# Patient Record
Sex: FEMALE | Race: WHITE | Employment: UNEMPLOYED | URBAN - METROPOLITAN AREA
[De-identification: names, ages, dates, MRNs, and addresses within clinical notes are randomized per-mention and may not be internally consistent; named-entity substitution may affect disease eponyms.]

---

## 2017-01-18 RX ORDER — LISINOPRIL 20 MG/1
TABLET ORAL
Qty: 30 TABLET | Refills: 3 | Status: SHIPPED | OUTPATIENT
Start: 2017-01-18 | End: 2017-02-10 | Stop reason: SDUPTHER

## 2017-02-12 RX ORDER — LISINOPRIL 20 MG/1
TABLET ORAL
Qty: 90 TABLET | Refills: 3 | Status: ON HOLD | OUTPATIENT
Start: 2017-02-12 | End: 2017-09-16 | Stop reason: SDDI

## 2017-03-20 ENCOUNTER — HOSPITAL ENCOUNTER (OUTPATIENT)
Age: 62
Discharge: HOME | End: 2017-03-20
Payer: MEDICARE

## 2017-03-20 ENCOUNTER — OFFICE VISIT (OUTPATIENT)
Dept: FAMILY MEDICINE CLINIC | Age: 62
End: 2017-03-20
Payer: MEDICARE

## 2017-03-20 VITALS
HEART RATE: 80 BPM | DIASTOLIC BLOOD PRESSURE: 78 MMHG | WEIGHT: 164 LBS | BODY MASS INDEX: 30.99 KG/M2 | SYSTOLIC BLOOD PRESSURE: 118 MMHG | TEMPERATURE: 96.7 F

## 2017-03-20 DIAGNOSIS — R51.9 FACIAL PAIN: Primary | ICD-10-CM

## 2017-03-20 DIAGNOSIS — F17.200 SMOKER: ICD-10-CM

## 2017-03-20 DIAGNOSIS — I25.10 CORONARY ARTERY DISEASE INVOLVING NATIVE CORONARY ARTERY OF NATIVE HEART WITHOUT ANGINA PECTORIS: ICD-10-CM

## 2017-03-20 DIAGNOSIS — I10 ESSENTIAL HYPERTENSION: ICD-10-CM

## 2017-03-20 DIAGNOSIS — Z12.31 SCREENING MAMMOGRAM, ENCOUNTER FOR: ICD-10-CM

## 2017-03-20 PROCEDURE — G8427 DOCREV CUR MEDS BY ELIG CLIN: HCPCS | Performed by: STUDENT IN AN ORGANIZED HEALTH CARE EDUCATION/TRAINING PROGRAM

## 2017-03-20 PROCEDURE — 99213 OFFICE O/P EST LOW 20 MIN: CPT | Performed by: FAMILY MEDICINE

## 2017-03-20 PROCEDURE — 3017F COLORECTAL CA SCREEN DOC REV: CPT | Performed by: STUDENT IN AN ORGANIZED HEALTH CARE EDUCATION/TRAINING PROGRAM

## 2017-03-20 PROCEDURE — 99213 OFFICE O/P EST LOW 20 MIN: CPT | Performed by: STUDENT IN AN ORGANIZED HEALTH CARE EDUCATION/TRAINING PROGRAM

## 2017-03-20 PROCEDURE — G8484 FLU IMMUNIZE NO ADMIN: HCPCS | Performed by: STUDENT IN AN ORGANIZED HEALTH CARE EDUCATION/TRAINING PROGRAM

## 2017-03-20 PROCEDURE — 4004F PT TOBACCO SCREEN RCVD TLK: CPT | Performed by: STUDENT IN AN ORGANIZED HEALTH CARE EDUCATION/TRAINING PROGRAM

## 2017-03-20 PROCEDURE — G8598 ASA/ANTIPLAT THER USED: HCPCS | Performed by: STUDENT IN AN ORGANIZED HEALTH CARE EDUCATION/TRAINING PROGRAM

## 2017-03-20 PROCEDURE — 3014F SCREEN MAMMO DOC REV: CPT | Performed by: STUDENT IN AN ORGANIZED HEALTH CARE EDUCATION/TRAINING PROGRAM

## 2017-03-20 PROCEDURE — G8417 CALC BMI ABV UP PARAM F/U: HCPCS | Performed by: STUDENT IN AN ORGANIZED HEALTH CARE EDUCATION/TRAINING PROGRAM

## 2017-03-20 RX ORDER — CLOPIDOGREL BISULFATE 75 MG/1
75 TABLET ORAL DAILY
Qty: 30 TABLET | Refills: 3 | Status: ON HOLD | OUTPATIENT
Start: 2017-03-20 | End: 2017-09-16 | Stop reason: SDDI

## 2017-03-20 ASSESSMENT — ENCOUNTER SYMPTOMS
SINUS PRESSURE: 0
EYE DISCHARGE: 0
EYE PAIN: 0
SHORTNESS OF BREATH: 0
VOMITING: 0
PHOTOPHOBIA: 0
SORE THROAT: 0
NAUSEA: 0
EYE REDNESS: 0
ABDOMINAL PAIN: 0

## 2017-04-04 ENCOUNTER — HOSPITAL ENCOUNTER (OUTPATIENT)
Age: 62
Setting detail: OBSERVATION
Discharge: HOME | End: 2017-04-06
Attending: EMERGENCY MEDICINE | Admitting: EMERGENCY MEDICINE
Payer: MEDICARE

## 2017-04-04 ENCOUNTER — APPOINTMENT (OUTPATIENT)
Dept: GENERAL RADIOLOGY | Age: 62
End: 2017-04-04
Payer: MEDICARE

## 2017-04-04 ENCOUNTER — APPOINTMENT (OUTPATIENT)
Dept: MRI IMAGING | Age: 62
End: 2017-04-04
Payer: MEDICARE

## 2017-04-04 DIAGNOSIS — G89.29 CHRONIC MIDLINE LOW BACK PAIN WITH LEFT-SIDED SCIATICA: ICD-10-CM

## 2017-04-04 DIAGNOSIS — R20.2 PARESTHESIAS: Primary | ICD-10-CM

## 2017-04-04 DIAGNOSIS — M79.605 PAIN OF LEFT LOWER EXTREMITY: ICD-10-CM

## 2017-04-04 DIAGNOSIS — M54.42 CHRONIC MIDLINE LOW BACK PAIN WITH LEFT-SIDED SCIATICA: ICD-10-CM

## 2017-04-04 LAB
ABSOLUTE EOS #: 0.3 K/UL (ref 0–0.4)
ABSOLUTE LYMPH #: 2.7 K/UL (ref 1–4.8)
ABSOLUTE MONO #: 0.6 K/UL (ref 0.1–1.2)
ANION GAP SERPL CALCULATED.3IONS-SCNC: 16 MMOL/L (ref 9–17)
BASOPHILS # BLD: 1 % (ref 0–2)
BASOPHILS ABSOLUTE: 0 K/UL (ref 0–0.2)
BUN BLDV-MCNC: 21 MG/DL (ref 8–23)
BUN/CREAT BLD: ABNORMAL (ref 9–20)
C-REACTIVE PROTEIN: 6.3 MG/L (ref 0–5)
CALCIUM SERPL-MCNC: 9.2 MG/DL (ref 8.6–10.4)
CHLORIDE BLD-SCNC: 98 MMOL/L (ref 98–107)
CO2: 22 MMOL/L (ref 20–31)
CREAT SERPL-MCNC: 0.69 MG/DL (ref 0.5–0.9)
DIFFERENTIAL TYPE: NORMAL
EOSINOPHILS RELATIVE PERCENT: 4 % (ref 1–4)
GFR AFRICAN AMERICAN: >60 ML/MIN
GFR NON-AFRICAN AMERICAN: >60 ML/MIN
GFR SERPL CREATININE-BSD FRML MDRD: ABNORMAL ML/MIN/{1.73_M2}
GFR SERPL CREATININE-BSD FRML MDRD: ABNORMAL ML/MIN/{1.73_M2}
GLUCOSE BLD-MCNC: 114 MG/DL (ref 70–99)
HCT VFR BLD CALC: 40 % (ref 36–46)
HEMOGLOBIN: 13.9 G/DL (ref 12–16)
LYMPHOCYTES # BLD: 36 % (ref 24–44)
MCH RBC QN AUTO: 30.7 PG (ref 26–34)
MCHC RBC AUTO-ENTMCNC: 34.8 G/DL (ref 31–37)
MCV RBC AUTO: 88.1 FL (ref 80–100)
MONOCYTES # BLD: 9 % (ref 2–11)
PDW BLD-RTO: 13.7 % (ref 12.5–15.4)
PLATELET # BLD: 230 K/UL (ref 140–450)
PLATELET ESTIMATE: NORMAL
PMV BLD AUTO: 7.8 FL (ref 6–12)
POTASSIUM SERPL-SCNC: 3.5 MMOL/L (ref 3.7–5.3)
RBC # BLD: 4.54 M/UL (ref 4–5.2)
RBC # BLD: NORMAL 10*6/UL
SEDIMENTATION RATE, ERYTHROCYTE: 9 MM (ref 0–20)
SEG NEUTROPHILS: 50 % (ref 36–66)
SEGMENTED NEUTROPHILS ABSOLUTE COUNT: 3.9 K/UL (ref 1.8–7.7)
SODIUM BLD-SCNC: 136 MMOL/L (ref 135–144)
WBC # BLD: 7.5 K/UL (ref 3.5–11)
WBC # BLD: NORMAL 10*3/UL

## 2017-04-04 PROCEDURE — 73590 X-RAY EXAM OF LOWER LEG: CPT

## 2017-04-04 PROCEDURE — 73502 X-RAY EXAM HIP UNI 2-3 VIEWS: CPT

## 2017-04-04 PROCEDURE — 80048 BASIC METABOLIC PNL TOTAL CA: CPT

## 2017-04-04 PROCEDURE — 96374 THER/PROPH/DIAG INJ IV PUSH: CPT

## 2017-04-04 PROCEDURE — G0378 HOSPITAL OBSERVATION PER HR: HCPCS

## 2017-04-04 PROCEDURE — 72146 MRI CHEST SPINE W/O DYE: CPT

## 2017-04-04 PROCEDURE — 85025 COMPLETE CBC W/AUTO DIFF WBC: CPT

## 2017-04-04 PROCEDURE — G0384 LEV 5 HOSP TYPE B ED VISIT: HCPCS

## 2017-04-04 PROCEDURE — 86140 C-REACTIVE PROTEIN: CPT

## 2017-04-04 PROCEDURE — 72148 MRI LUMBAR SPINE W/O DYE: CPT

## 2017-04-04 PROCEDURE — 6370000000 HC RX 637 (ALT 250 FOR IP): Performed by: EMERGENCY MEDICINE

## 2017-04-04 PROCEDURE — 6360000002 HC RX W HCPCS: Performed by: EMERGENCY MEDICINE

## 2017-04-04 PROCEDURE — 85651 RBC SED RATE NONAUTOMATED: CPT

## 2017-04-04 RX ORDER — AMLODIPINE BESYLATE 5 MG/1
5 TABLET ORAL DAILY
Status: DISCONTINUED | OUTPATIENT
Start: 2017-04-05 | End: 2017-04-06 | Stop reason: HOSPADM

## 2017-04-04 RX ORDER — HYDROCODONE BITARTRATE AND ACETAMINOPHEN 5; 325 MG/1; MG/1
1 TABLET ORAL EVERY 4 HOURS PRN
Status: DISCONTINUED | OUTPATIENT
Start: 2017-04-04 | End: 2017-04-06 | Stop reason: HOSPADM

## 2017-04-04 RX ORDER — ACETAMINOPHEN 325 MG/1
650 TABLET ORAL EVERY 4 HOURS PRN
Status: DISCONTINUED | OUTPATIENT
Start: 2017-04-04 | End: 2017-04-06 | Stop reason: HOSPADM

## 2017-04-04 RX ORDER — CLOPIDOGREL BISULFATE 75 MG/1
75 TABLET ORAL DAILY
Status: DISCONTINUED | OUTPATIENT
Start: 2017-04-05 | End: 2017-04-06 | Stop reason: HOSPADM

## 2017-04-04 RX ORDER — MORPHINE SULFATE 4 MG/ML
4 INJECTION, SOLUTION INTRAMUSCULAR; INTRAVENOUS ONCE
Status: COMPLETED | OUTPATIENT
Start: 2017-04-04 | End: 2017-04-04

## 2017-04-04 RX ORDER — LISINOPRIL 20 MG/1
20 TABLET ORAL DAILY
Status: DISCONTINUED | OUTPATIENT
Start: 2017-04-05 | End: 2017-04-06 | Stop reason: HOSPADM

## 2017-04-04 RX ORDER — NITROGLYCERIN 0.4 MG/1
0.4 TABLET SUBLINGUAL EVERY 5 MIN PRN
Status: DISCONTINUED | OUTPATIENT
Start: 2017-04-04 | End: 2017-04-06 | Stop reason: HOSPADM

## 2017-04-04 RX ORDER — SODIUM CHLORIDE 0.9 % (FLUSH) 0.9 %
10 SYRINGE (ML) INJECTION PRN
Status: DISCONTINUED | OUTPATIENT
Start: 2017-04-04 | End: 2017-04-06 | Stop reason: HOSPADM

## 2017-04-04 RX ORDER — HYDROCODONE BITARTRATE AND ACETAMINOPHEN 5; 325 MG/1; MG/1
2 TABLET ORAL EVERY 4 HOURS PRN
Status: DISCONTINUED | OUTPATIENT
Start: 2017-04-04 | End: 2017-04-05

## 2017-04-04 RX ORDER — OMEPRAZOLE 20 MG/1
20 CAPSULE, DELAYED RELEASE ORAL DAILY
Status: DISCONTINUED | OUTPATIENT
Start: 2017-04-05 | End: 2017-04-06 | Stop reason: HOSPADM

## 2017-04-04 RX ORDER — ONDANSETRON 2 MG/ML
4 INJECTION INTRAMUSCULAR; INTRAVENOUS EVERY 6 HOURS PRN
Status: DISCONTINUED | OUTPATIENT
Start: 2017-04-04 | End: 2017-04-06 | Stop reason: HOSPADM

## 2017-04-04 RX ORDER — METOPROLOL SUCCINATE 25 MG/1
12.5 TABLET, EXTENDED RELEASE ORAL EVERY EVENING
Status: DISCONTINUED | OUTPATIENT
Start: 2017-04-04 | End: 2017-04-06 | Stop reason: HOSPADM

## 2017-04-04 RX ORDER — ATORVASTATIN CALCIUM 40 MG/1
40 TABLET, FILM COATED ORAL NIGHTLY
Status: DISCONTINUED | OUTPATIENT
Start: 2017-04-04 | End: 2017-04-06 | Stop reason: HOSPADM

## 2017-04-04 RX ORDER — SODIUM CHLORIDE 0.9 % (FLUSH) 0.9 %
10 SYRINGE (ML) INJECTION EVERY 12 HOURS SCHEDULED
Status: DISCONTINUED | OUTPATIENT
Start: 2017-04-04 | End: 2017-04-06 | Stop reason: HOSPADM

## 2017-04-04 RX ORDER — OXYCODONE HYDROCHLORIDE AND ACETAMINOPHEN 5; 325 MG/1; MG/1
1 TABLET ORAL ONCE
Status: COMPLETED | OUTPATIENT
Start: 2017-04-04 | End: 2017-04-04

## 2017-04-04 RX ADMIN — MORPHINE SULFATE 4 MG: 4 INJECTION, SOLUTION INTRAMUSCULAR; INTRAVENOUS at 19:50

## 2017-04-04 RX ADMIN — OXYCODONE HYDROCHLORIDE AND ACETAMINOPHEN 1 TABLET: 5; 325 TABLET ORAL at 18:56

## 2017-04-04 ASSESSMENT — ENCOUNTER SYMPTOMS
SHORTNESS OF BREATH: 0
STRIDOR: 0
DIARRHEA: 0
COUGH: 0
NAUSEA: 0
ABDOMINAL PAIN: 0
WHEEZING: 0
VOMITING: 0
BACK PAIN: 1
CONSTIPATION: 0

## 2017-04-04 ASSESSMENT — PAIN DESCRIPTION - PAIN TYPE
TYPE: ACUTE PAIN
TYPE: ACUTE PAIN

## 2017-04-04 ASSESSMENT — PAIN DESCRIPTION - DESCRIPTORS
DESCRIPTORS: SHOOTING
DESCRIPTORS: SHOOTING

## 2017-04-04 ASSESSMENT — PAIN DESCRIPTION - ONSET
ONSET: SUDDEN
ONSET: SUDDEN

## 2017-04-04 ASSESSMENT — PAIN DESCRIPTION - PROGRESSION
CLINICAL_PROGRESSION: GRADUALLY IMPROVING
CLINICAL_PROGRESSION: GRADUALLY WORSENING

## 2017-04-04 ASSESSMENT — PAIN DESCRIPTION - ORIENTATION
ORIENTATION: LEFT
ORIENTATION: LEFT

## 2017-04-04 ASSESSMENT — PAIN DESCRIPTION - LOCATION
LOCATION: FOOT;LEG
LOCATION: FOOT;LEG

## 2017-04-04 ASSESSMENT — PAIN DESCRIPTION - FREQUENCY: FREQUENCY: INTERMITTENT

## 2017-04-04 ASSESSMENT — PAIN SCALES - GENERAL
PAINLEVEL_OUTOF10: 10
PAINLEVEL_OUTOF10: 7
PAINLEVEL_OUTOF10: 10
PAINLEVEL_OUTOF10: 1

## 2017-04-05 PROCEDURE — 6370000000 HC RX 637 (ALT 250 FOR IP): Performed by: STUDENT IN AN ORGANIZED HEALTH CARE EDUCATION/TRAINING PROGRAM

## 2017-04-05 PROCEDURE — 96375 TX/PRO/DX INJ NEW DRUG ADDON: CPT

## 2017-04-05 PROCEDURE — 2580000003 HC RX 258: Performed by: STUDENT IN AN ORGANIZED HEALTH CARE EDUCATION/TRAINING PROGRAM

## 2017-04-05 PROCEDURE — 6360000002 HC RX W HCPCS: Performed by: STUDENT IN AN ORGANIZED HEALTH CARE EDUCATION/TRAINING PROGRAM

## 2017-04-05 PROCEDURE — G8979 MOBILITY GOAL STATUS: HCPCS

## 2017-04-05 PROCEDURE — 97162 PT EVAL MOD COMPLEX 30 MIN: CPT

## 2017-04-05 PROCEDURE — G8978 MOBILITY CURRENT STATUS: HCPCS

## 2017-04-05 PROCEDURE — 97530 THERAPEUTIC ACTIVITIES: CPT

## 2017-04-05 PROCEDURE — G0378 HOSPITAL OBSERVATION PER HR: HCPCS

## 2017-04-05 RX ORDER — HYDROCODONE BITARTRATE AND ACETAMINOPHEN 5; 325 MG/1; MG/1
1 TABLET ORAL EVERY 4 HOURS PRN
Qty: 12 TABLET | Refills: 0 | Status: SHIPPED | OUTPATIENT
Start: 2017-04-05 | End: 2017-04-12

## 2017-04-05 RX ORDER — HYDROCODONE BITARTRATE AND ACETAMINOPHEN 5; 325 MG/1; MG/1
1 TABLET ORAL EVERY 4 HOURS PRN
Qty: 12 TABLET | Refills: 0 | Status: SHIPPED | OUTPATIENT
Start: 2017-04-05 | End: 2017-04-05

## 2017-04-05 RX ORDER — ONDANSETRON 4 MG/1
4 TABLET, FILM COATED ORAL EVERY 8 HOURS PRN
Qty: 20 TABLET | Refills: 0 | Status: ON HOLD | OUTPATIENT
Start: 2017-04-05 | End: 2017-09-16 | Stop reason: ALTCHOICE

## 2017-04-05 RX ADMIN — OMEPRAZOLE 20 MG: 20 CAPSULE, DELAYED RELEASE ORAL at 08:18

## 2017-04-05 RX ADMIN — HYDROCODONE BITARTRATE AND ACETAMINOPHEN 2 TABLET: 5; 325 TABLET ORAL at 08:18

## 2017-04-05 RX ADMIN — METOPROLOL SUCCINATE 12.5 MG: 25 TABLET, FILM COATED, EXTENDED RELEASE ORAL at 01:22

## 2017-04-05 RX ADMIN — ATORVASTATIN CALCIUM 40 MG: 40 TABLET, FILM COATED ORAL at 21:14

## 2017-04-05 RX ADMIN — LISINOPRIL 20 MG: 20 TABLET ORAL at 08:18

## 2017-04-05 RX ADMIN — Medication 10 ML: at 01:23

## 2017-04-05 RX ADMIN — Medication 10 ML: at 08:18

## 2017-04-05 RX ADMIN — AMLODIPINE BESYLATE 5 MG: 5 TABLET ORAL at 08:18

## 2017-04-05 RX ADMIN — Medication 10 ML: at 21:14

## 2017-04-05 RX ADMIN — Medication 10 ML: at 12:47

## 2017-04-05 RX ADMIN — METOPROLOL SUCCINATE 12.5 MG: 25 TABLET, FILM COATED, EXTENDED RELEASE ORAL at 08:17

## 2017-04-05 RX ADMIN — ONDANSETRON 4 MG: 2 INJECTION, SOLUTION INTRAMUSCULAR; INTRAVENOUS at 12:47

## 2017-04-05 RX ADMIN — CLOPIDOGREL 75 MG: 75 TABLET, FILM COATED ORAL at 08:18

## 2017-04-05 RX ADMIN — ATORVASTATIN CALCIUM 40 MG: 40 TABLET, FILM COATED ORAL at 01:22

## 2017-04-05 ASSESSMENT — PAIN SCALES - GENERAL
PAINLEVEL_OUTOF10: 7
PAINLEVEL_OUTOF10: 3
PAINLEVEL_OUTOF10: 7

## 2017-04-05 ASSESSMENT — PAIN DESCRIPTION - PAIN TYPE
TYPE: ACUTE PAIN
TYPE: ACUTE PAIN

## 2017-04-05 ASSESSMENT — PAIN DESCRIPTION - FREQUENCY: FREQUENCY: INTERMITTENT

## 2017-04-05 ASSESSMENT — PAIN DESCRIPTION - LOCATION
LOCATION: FOOT;LEG
LOCATION: FOOT;LEG

## 2017-04-05 ASSESSMENT — PAIN DESCRIPTION - PROGRESSION: CLINICAL_PROGRESSION: NOT CHANGED

## 2017-04-05 ASSESSMENT — PAIN DESCRIPTION - DESCRIPTORS: DESCRIPTORS: NUMBNESS;SHOOTING

## 2017-04-05 ASSESSMENT — PAIN DESCRIPTION - ONSET: ONSET: SUDDEN

## 2017-04-05 ASSESSMENT — PAIN DESCRIPTION - ORIENTATION
ORIENTATION: LEFT
ORIENTATION: LEFT

## 2017-04-06 ENCOUNTER — TELEPHONE (OUTPATIENT)
Dept: FAMILY MEDICINE CLINIC | Age: 62
End: 2017-04-06

## 2017-04-06 ENCOUNTER — APPOINTMENT (OUTPATIENT)
Dept: MRI IMAGING | Age: 62
End: 2017-04-06
Payer: MEDICARE

## 2017-04-06 VITALS
HEIGHT: 61 IN | RESPIRATION RATE: 16 BRPM | DIASTOLIC BLOOD PRESSURE: 79 MMHG | WEIGHT: 164.8 LBS | OXYGEN SATURATION: 97 % | BODY MASS INDEX: 31.11 KG/M2 | SYSTOLIC BLOOD PRESSURE: 151 MMHG | TEMPERATURE: 98.4 F | HEART RATE: 68 BPM

## 2017-04-06 PROCEDURE — G8987 SELF CARE CURRENT STATUS: HCPCS

## 2017-04-06 PROCEDURE — G8989 SELF CARE D/C STATUS: HCPCS

## 2017-04-06 PROCEDURE — 97535 SELF CARE MNGMENT TRAINING: CPT

## 2017-04-06 PROCEDURE — 6370000000 HC RX 637 (ALT 250 FOR IP): Performed by: STUDENT IN AN ORGANIZED HEALTH CARE EDUCATION/TRAINING PROGRAM

## 2017-04-06 PROCEDURE — 97116 GAIT TRAINING THERAPY: CPT

## 2017-04-06 PROCEDURE — 2580000003 HC RX 258: Performed by: EMERGENCY MEDICINE

## 2017-04-06 PROCEDURE — 97110 THERAPEUTIC EXERCISES: CPT

## 2017-04-06 PROCEDURE — A9579 GAD-BASE MR CONTRAST NOS,1ML: HCPCS | Performed by: EMERGENCY MEDICINE

## 2017-04-06 PROCEDURE — G8988 SELF CARE GOAL STATUS: HCPCS

## 2017-04-06 PROCEDURE — G0378 HOSPITAL OBSERVATION PER HR: HCPCS

## 2017-04-06 PROCEDURE — 6360000004 HC RX CONTRAST MEDICATION: Performed by: EMERGENCY MEDICINE

## 2017-04-06 PROCEDURE — 97165 OT EVAL LOW COMPLEX 30 MIN: CPT

## 2017-04-06 PROCEDURE — 72147 MRI CHEST SPINE W/DYE: CPT

## 2017-04-06 RX ORDER — SODIUM CHLORIDE 0.9 % (FLUSH) 0.9 %
10 SYRINGE (ML) INJECTION 2 TIMES DAILY
Status: DISCONTINUED | OUTPATIENT
Start: 2017-04-06 | End: 2017-04-06 | Stop reason: HOSPADM

## 2017-04-06 RX ADMIN — AMLODIPINE BESYLATE 5 MG: 5 TABLET ORAL at 09:02

## 2017-04-06 RX ADMIN — OMEPRAZOLE 20 MG: 20 CAPSULE, DELAYED RELEASE ORAL at 09:03

## 2017-04-06 RX ADMIN — LISINOPRIL 20 MG: 20 TABLET ORAL at 09:02

## 2017-04-06 RX ADMIN — GADOPENTETATE DIMEGLUMINE 14 ML: 469.01 INJECTION INTRAVENOUS at 05:38

## 2017-04-06 RX ADMIN — CLOPIDOGREL 75 MG: 75 TABLET, FILM COATED ORAL at 09:02

## 2017-04-06 RX ADMIN — Medication 10 ML: at 09:03

## 2017-04-06 RX ADMIN — METOPROLOL SUCCINATE 12.5 MG: 25 TABLET, FILM COATED, EXTENDED RELEASE ORAL at 09:02

## 2017-04-11 ENCOUNTER — TELEPHONE (OUTPATIENT)
Dept: FAMILY MEDICINE CLINIC | Age: 62
End: 2017-04-11

## 2017-04-19 ENCOUNTER — OFFICE VISIT (OUTPATIENT)
Dept: FAMILY MEDICINE CLINIC | Age: 62
End: 2017-04-19
Payer: MEDICARE

## 2017-04-19 VITALS
SYSTOLIC BLOOD PRESSURE: 157 MMHG | HEART RATE: 126 BPM | TEMPERATURE: 97.7 F | DIASTOLIC BLOOD PRESSURE: 93 MMHG | WEIGHT: 162.8 LBS | BODY MASS INDEX: 30.73 KG/M2 | HEIGHT: 61 IN

## 2017-04-19 DIAGNOSIS — M48.061 FORAMINAL STENOSIS OF LUMBAR REGION: ICD-10-CM

## 2017-04-19 DIAGNOSIS — D18.09 VERTEBRAL BODY HEMANGIOMA: ICD-10-CM

## 2017-04-19 DIAGNOSIS — R20.0 LEFT LEG NUMBNESS: Primary | ICD-10-CM

## 2017-04-19 PROCEDURE — G8427 DOCREV CUR MEDS BY ELIG CLIN: HCPCS | Performed by: FAMILY MEDICINE

## 2017-04-19 PROCEDURE — 99214 OFFICE O/P EST MOD 30 MIN: CPT

## 2017-04-19 PROCEDURE — 4004F PT TOBACCO SCREEN RCVD TLK: CPT | Performed by: FAMILY MEDICINE

## 2017-04-19 PROCEDURE — 99213 OFFICE O/P EST LOW 20 MIN: CPT | Performed by: FAMILY MEDICINE

## 2017-04-19 PROCEDURE — 3017F COLORECTAL CA SCREEN DOC REV: CPT | Performed by: FAMILY MEDICINE

## 2017-04-19 PROCEDURE — G8598 ASA/ANTIPLAT THER USED: HCPCS | Performed by: FAMILY MEDICINE

## 2017-04-19 PROCEDURE — G8417 CALC BMI ABV UP PARAM F/U: HCPCS | Performed by: FAMILY MEDICINE

## 2017-04-19 PROCEDURE — 3014F SCREEN MAMMO DOC REV: CPT | Performed by: FAMILY MEDICINE

## 2017-04-19 ASSESSMENT — ENCOUNTER SYMPTOMS
BACK PAIN: 0
VOMITING: 0
NAUSEA: 0
ABDOMINAL PAIN: 0
SHORTNESS OF BREATH: 0

## 2017-05-08 RX ORDER — LORATADINE 10 MG/1
TABLET ORAL
Qty: 30 TABLET | Refills: 0 | Status: ON HOLD | OUTPATIENT
Start: 2017-05-08 | End: 2017-09-16 | Stop reason: SDDI

## 2017-09-15 ENCOUNTER — HOSPITAL ENCOUNTER (INPATIENT)
Age: 62
LOS: 3 days | Discharge: HOME | DRG: 280 | End: 2017-09-21
Attending: EMERGENCY MEDICINE | Admitting: INTERNAL MEDICINE
Payer: MEDICARE

## 2017-09-15 ENCOUNTER — APPOINTMENT (OUTPATIENT)
Dept: GENERAL RADIOLOGY | Age: 62
DRG: 280 | End: 2017-09-15
Payer: MEDICARE

## 2017-09-15 DIAGNOSIS — R07.9 CHEST PAIN, UNSPECIFIED TYPE: ICD-10-CM

## 2017-09-15 DIAGNOSIS — I25.10 CORONARY ARTERY DISEASE INVOLVING NATIVE CORONARY ARTERY OF NATIVE HEART WITHOUT ANGINA PECTORIS: Primary | ICD-10-CM

## 2017-09-15 LAB
ABSOLUTE EOS #: 0.2 K/UL (ref 0–0.4)
ABSOLUTE LYMPH #: 3.2 K/UL (ref 1–4.8)
ABSOLUTE MONO #: 0.7 K/UL (ref 0.2–0.8)
ANION GAP SERPL CALCULATED.3IONS-SCNC: 16 MMOL/L (ref 9–17)
BASOPHILS # BLD: 1 %
BASOPHILS ABSOLUTE: 0.1 K/UL (ref 0–0.2)
BUN BLDV-MCNC: 24 MG/DL (ref 8–23)
BUN/CREAT BLD: 34 (ref 9–20)
CALCIUM SERPL-MCNC: 9 MG/DL (ref 8.6–10.4)
CHLORIDE BLD-SCNC: 104 MMOL/L (ref 98–107)
CO2: 21 MMOL/L (ref 20–31)
CREAT SERPL-MCNC: 0.7 MG/DL (ref 0.5–0.9)
DIFFERENTIAL TYPE: NORMAL
EOSINOPHILS RELATIVE PERCENT: 3 %
GFR AFRICAN AMERICAN: >60 ML/MIN
GFR NON-AFRICAN AMERICAN: >60 ML/MIN
GFR SERPL CREATININE-BSD FRML MDRD: ABNORMAL ML/MIN/{1.73_M2}
GFR SERPL CREATININE-BSD FRML MDRD: ABNORMAL ML/MIN/{1.73_M2}
GLUCOSE BLD-MCNC: 148 MG/DL (ref 70–99)
HCT VFR BLD CALC: 42.7 % (ref 36–46)
HEMOGLOBIN: 14.6 G/DL (ref 12–16)
INR BLD: 0.9
LYMPHOCYTES # BLD: 36 %
MCH RBC QN AUTO: 30.7 PG (ref 26–34)
MCHC RBC AUTO-ENTMCNC: 34.1 G/DL (ref 31–37)
MCV RBC AUTO: 90.2 FL (ref 80–100)
MONOCYTES # BLD: 8 %
MYOGLOBIN: <21 NG/ML (ref 25–58)
PARTIAL THROMBOPLASTIN TIME: 25.4 SEC (ref 23–31)
PDW BLD-RTO: 13.8 % (ref 11.5–14.5)
PLATELET # BLD: 214 K/UL (ref 130–400)
PLATELET ESTIMATE: NORMAL
PMV BLD AUTO: 8 FL (ref 6–12)
POTASSIUM SERPL-SCNC: 4.6 MMOL/L (ref 3.7–5.3)
PROTHROMBIN TIME: 9.5 SEC (ref 9.7–11.6)
RBC # BLD: 4.74 M/UL (ref 4–5.2)
RBC # BLD: NORMAL 10*6/UL
SEG NEUTROPHILS: 52 %
SEGMENTED NEUTROPHILS ABSOLUTE COUNT: 4.7 K/UL (ref 1.8–7.7)
SODIUM BLD-SCNC: 141 MMOL/L (ref 135–144)
TROPONIN INTERP: ABNORMAL
TROPONIN T: <0.03 NG/ML
WBC # BLD: 8.9 K/UL (ref 3.5–11)
WBC # BLD: NORMAL 10*3/UL

## 2017-09-15 PROCEDURE — 85730 THROMBOPLASTIN TIME PARTIAL: CPT

## 2017-09-15 PROCEDURE — 2580000003 HC RX 258: Performed by: INTERNAL MEDICINE

## 2017-09-15 PROCEDURE — 99285 EMERGENCY DEPT VISIT HI MDM: CPT

## 2017-09-15 PROCEDURE — G0378 HOSPITAL OBSERVATION PER HR: HCPCS

## 2017-09-15 PROCEDURE — 71010 XR CHEST PORTABLE: CPT

## 2017-09-15 PROCEDURE — 83874 ASSAY OF MYOGLOBIN: CPT

## 2017-09-15 PROCEDURE — 80048 BASIC METABOLIC PNL TOTAL CA: CPT

## 2017-09-15 PROCEDURE — 36415 COLL VENOUS BLD VENIPUNCTURE: CPT

## 2017-09-15 PROCEDURE — 93005 ELECTROCARDIOGRAM TRACING: CPT

## 2017-09-15 PROCEDURE — 84484 ASSAY OF TROPONIN QUANT: CPT

## 2017-09-15 PROCEDURE — 85025 COMPLETE CBC W/AUTO DIFF WBC: CPT

## 2017-09-15 PROCEDURE — 85610 PROTHROMBIN TIME: CPT

## 2017-09-15 RX ORDER — SODIUM CHLORIDE 0.9 % (FLUSH) 0.9 %
10 SYRINGE (ML) INJECTION PRN
Status: DISCONTINUED | OUTPATIENT
Start: 2017-09-15 | End: 2017-09-16 | Stop reason: SDUPTHER

## 2017-09-15 RX ORDER — SODIUM CHLORIDE 0.9 % (FLUSH) 0.9 %
10 SYRINGE (ML) INJECTION EVERY 12 HOURS SCHEDULED
Status: DISCONTINUED | OUTPATIENT
Start: 2017-09-15 | End: 2017-09-16 | Stop reason: SDUPTHER

## 2017-09-15 RX ADMIN — Medication 10 ML: at 23:45

## 2017-09-15 ASSESSMENT — ENCOUNTER SYMPTOMS
DIARRHEA: 0
EYE DISCHARGE: 0
EYE REDNESS: 0
CONSTIPATION: 0
ABDOMINAL PAIN: 0
VOMITING: 0
COUGH: 0
FACIAL SWELLING: 0
COLOR CHANGE: 0
SHORTNESS OF BREATH: 0

## 2017-09-15 NOTE — IP AVS SNAPSHOT
Patient Information     Patient Name TANIYA Gupta 1955         This is your updated medication list to keep with you all times      TAKE these medications     amLODIPine 5 MG tablet   Commonly known as:  NORVASC   Take 1 tablet by mouth daily       aspirin 81 MG tablet       atorvastatin 80 MG tablet   Commonly known as:  LIPITOR   Take 1 tablet by mouth nightly       cephALEXin 500 MG capsule   Commonly known as:  KEFLEX   Take 1 capsule by mouth 4 times daily       ibuprofen 200 MG tablet   Commonly known as:  ADVIL;MOTRIN       metoprolol tartrate 25 MG tablet   Commonly known as:  LOPRESSOR   Take 1 tablet by mouth 2 times daily       neomycin-bacitracin-polymyxin 400-5-5000 ointment   Commonly known as:  NEOSPORIN   Apply topically 2 times daily. NITROSTAT 0.4 MG SL tablet   Generic drug:  nitroGLYCERIN   DISSOLVE 1 TAB UNDER TONGUE FOR CHEST PAIN - IF PAIN REMAINS AFTER 5 MIN, CALL 911 AND REPEAT DOSE.  MAX 3 TABS IN 15 MINUTES

## 2017-09-15 NOTE — IP AVS SNAPSHOT
After Visit Summary  (Discharge Instructions)    Medication List for Home    Based on the information you provided to us as well as any changes during this visit, the following is your updated medication list.  Compare this with your prescription bottles at home. If you have any questions or concerns, contact your primary care physician's office. Daily Medication List (This medication list can be shared with any healthcare provider who is helping you manage your medications)      There are NEW medications for you. START taking them after you leave the hospital        Last Dose    Next Dose Due AM NOON PM NIGHT    amLODIPine 5 MG tablet   Commonly known as:  NORVASC   Take 1 tablet by mouth daily                5 mg on 9/21/2017 11:08 AM     9/22/17                       atorvastatin 80 MG tablet   Commonly known as:  LIPITOR   Take 1 tablet by mouth nightly                80 mg on 9/20/2017  9:08 PM     Take tonight 9/21/17                Take tonight 9/21/17       cephALEXin 500 MG capsule   Commonly known as:  KEFLEX   Take 1 capsule by mouth 4 times daily                  Take tonight 9/21/17                         metoprolol tartrate 25 MG tablet   Commonly known as:  LOPRESSOR   Take 1 tablet by mouth 2 times daily                25 mg on 9/20/2017  9:08 PM     Take tonight 9/21/17                       neomycin-bacitracin-polymyxin 400-5-5000 ointment   Commonly known as:  NEOSPORIN   Apply topically 2 times daily.                 9/21/2017 11:07 AM     9/21/17                         These are medications you told us you were taking at home, CONTINUE taking them after you leave the hospital        Last Dose    Next Dose Due AM NOON PM NIGHT    aspirin 81 MG tablet   Take 81 mg by mouth daily                  9/22/17                       ibuprofen 200 MG tablet   Commonly known as:  ADVIL;MOTRIN   Take 200 mg by mouth every 6 hours as needed for Pain 400 mg on 2017  6:05 PM                            NITROSTAT 0.4 MG SL tablet   Generic drug:  nitroGLYCERIN   DISSOLVE 1 TAB UNDER TONGUE FOR CHEST PAIN - IF PAIN REMAINS AFTER 5 MIN, CALL 911 AND REPEAT DOSE. MAX 3 TABS IN 15 MINUTES                                              Where to Get Your Medications      You can get these medications from any pharmacy     Bring a paper prescription for each of these medications     amLODIPine 5 MG tablet    atorvastatin 80 MG tablet    cephALEXin 500 MG capsule    metoprolol tartrate 25 MG tablet    neomycin-bacitracin-polymyxin 400-5-5000 ointment               Allergies as of 2017        Reactions    Latex Hives, Swelling    Codeine Nausea And Vomiting    Tape [Adhesive Tape] Dermatitis    Medical tape      Immunizations as of 2017     Name Date Dose VIS Date Route    Pneumococcal Polysaccharide (Astpeoojk54) 2012 -- -- --    Tdap (Boostrix, Adacel) 2016 0.5 mL 2015 Intramuscular      Last Vitals          Most Recent Value    Temp  99 °F (37.2 °C)    Pulse  91    Resp  16    BP  126/67         After Visit Summary    This summary was created for you. Thank you for entrusting your care to us. The following information includes details about your hospital/visit stay along with steps you should take to help with your recovery once you leave the hospital.  In this packet, you will find information about the topics listed below:    · Instructions about your medications including a list of your home medications  · A summary of your hospital visit  · Follow-up appointments once you have left the hospital  · Your care plan at home      You may receive a survey regarding the care you received during your stay. Your input is valuable to us. We encourage you to complete and return your survey in the envelope provided. We hope you will choose us in the future for your healthcare needs.           Patient Information     Patient Name  Mary Ann Severino 1955      Care Provided at:     Name Address Phone       [address] [place] [id number] [facility] [phone]            Your Visit    Here you will find information about your visit, including the reason for your visit. Please take this sheet with you when you visit your doctor or other health care provider in the future. It will help determine the best possible medical care for you at that time. If you have any questions once you leave the hospital, please call the department phone number listed below. Why you were here     Your primary diagnosis was:  S/P Cardiac Cath    Your diagnoses also included:  Chest Pain, Cad (Coronary Artery Disease), Copd (Chronic Obstructive Pulmonary Disease) (Hcc), Non-Compliance, Cellulitis Of Arm, Right, Superficial Thrombophlebitis Of Right Upper Extremity      Visit Information     Date & Time Provider Department Dept. Phone    9/15/2017 Jm Rodriguez MD [facility] [phone]       Follow-up Appointments    Below is a list of your follow-up and future appointments. This may not be a complete list as you may have made appointments directly with providers that we are not aware of or your providers may have made some for you. Please call your providers to confirm appointments. It is important to keep your appointments. Please bring your current insurance card, photo ID, co-pay, and all medication bottles to your appointment. If self-pay, payment is expected at the time of service. Follow-up Information     Follow up with Jm Rodriguez MD.    Specialty:  Internal Medicine    Why:  For follow-up s/p hospitalization. Contact information:    [address] [place] [address]  [phone]          Follow up with Patsy Carter MD .    Specialty:  Family Medicine    Contact information:    [place] [address]  [phone]          Follow up with Viridiana Hernandez MD On 9/27/2017. Specialties:  Cardiology, Internal Medicine    Why:  at 10:20 AM for follow up appointment after hospitalization    Contact information:    [place]  [address]  [phone]          Follow up with Kym Guerrero On 9/26/2017. Why:  at 11:00 AM for follow up appointment r/t CABG    Contact information:    [address]  [address]        Follow up with Princess Lemon MD In 1 week. Specialty:  Family Medicine    Contact information:    [facility] [address]  [phone]        Future Appointments     9/27/2017 10:20 AM     Appointment with Todd Hinkle MD at [address] [place] [address] ([phone])   Please arrive 15 minutes prior to appointment time, bring insurance card and photo ID.    [address] [place] [address]  [phone]         Preventive Care        Date Due    Hepatitis C screening is recommended for all adults regardless of risk factors born between [facility] at least once (lifetime) who have never been tested. 1955    HIV screening is recommended for all people regardless of risk factors  aged 15-65 years at least once (lifetime) who have never been HIV tested. 7/6/1969    Zoster Vaccine 7/6/2014    Mammograms are recommended every 2 years for low/average risk patients aged 48 - 69, and every year for high risk patients per updated national guidelines. However these guidelines can be individualized by your provider.  3/25/2016    Yearly Flu Vaccine (1) 9/1/2017    Colon Cancer Stool Test 12/19/2017 (Originally 4/5/2015)    Diabetes Screening 8/25/2019    Cholesterol Screening 9/16/2022    Tetanus Combination Vaccine (2 - Td) 12/7/2026                 Care Plan Once You Return Home    This section includes instructions you will need to follow once you leave the hospital.  Your care team will discuss these with you, so you and those caring for you know how to best care for your health needs at home. This section may also include educational information about certain health topics that may be of help to you.           Discharge [address] [place] [address] Form    Patient Name: Grace Pedersen   :  1955  MRN:  [id number]    Admit date:  9/15/2017  Discharge date:  ***    Code Status Order: Full Code   Advance Directives: {YES OR NO:}    Admitting Physician:  Dylan Peck MD  PCP: Regino Wagner MD    Discharging Nurse: [facility] Unit/Room#: 2034/2034-01  Discharging Unit Phone Number: ***    Emergency Contact:        Past Surgical History:  Past Surgical History:   Procedure Laterality Date    CORONARY ANGIOPLASTY WITH STENT PLACEMENT  2006,2013    3 stents total    CYST REMOVAL Right 2013    right foot    ENDOSCOPY, COLON, DIAGNOSTIC      stomach ulcer    HYSTERECTOMY      total    SKIN BIOPSY      neg , on back   [address]       Immunization History:   Immunization History   Administered Date(s) Administered    Pneumococcal Polysaccharide (Pjloqkyqv13) 2012    Tdap (Boostrix, Adacel) 2016       Active Problems:  Patient Active Problem List   Diagnosis    CAD (coronary artery disease)    Chronic bronchitis ([address]    Smoker    GERD (gastroesophageal reflux disease)    Thoracic scoliosis    Ganglion cyst    CAD S/P percutaneous coronary angioplasty (2013-Dr. Ruddy Cummings)    CAD S/P percutaneous coronary angioplasty-open stent( 2013-Dr. Serafin Espinal)    Chest pain    CAD (coronary artery disease)    HTN (hypertension)    Atypical chest pain    S/P - Mid RCA 10/17/14-Dr. nichols    S/P stents LAD, LCX and proximal RCA -     Hyperlipidemia    Chest pain     HLD (hyperlipidemia)    GERD (gastroesophageal reflux disease)    Chronic back pain    Precordial pain    Breast cancer screening    Lumbosacral pain Routes of Feeding: {CHP DME Other Feedings:067090555}  Liquids: {Slp liquid thickness:96376}  Daily Fluid Restriction: {CHP DME Yes amt example:909665860}  Last Modified Barium Swallow with Video (Video Swallowing Test): {Done Not Done SNOI:386652449}    Treatments at the Time of Hospital Discharge:   Respiratory Treatments: ***  Oxygen Therapy:  {Therapy; copd oxygen:43073}  Ventilator:    {[facility] Vent NALT:545070513}    Rehab Therapies: {THERAPEUTIC INTERVENTION:6158412928}  Weight Bearing Status/Restrictions: {[facility] Weight Bearin}  Other Medical Equipment (for information only, NOT a DME order):  {EQUIPMENT:404401507}  Other Treatments: ***    Patient's personal belongings (please select all that are sent with patient):  {[facility] DME Belongings:210232076}    RN SIGNATURE:  {Esignature:179958234}    PHYSICIAN SECTION    Prognosis: {Prognosis:6862082434}    Condition at Discharge: [address] [place] [address] Patient Condition:951037631}    Rehab Potential (if transferring to Rehab): {Prognosis:8143546287}    Recommended Labs or Other Treatments After Discharge: ***    Physician Certification: I certify the above information and transfer of Kolby Gibson  is necessary for the continuing treatment of the diagnosis listed and that she requires {Admit to Appropriate Level of Care:58814} for {GREATER/LESS:011355398} 30 days.      Update Admission H&P: {CHP DME Changes in KRXYC:746043307}    PHYSICIAN SIGNATURE:  {Esignature:083590132}    CASE MANAGEMENT/SOCIAL WORK SECTION    Inpatient Status Date: ***    [facility] Readmission Risk Assessment Score:  Risk Score: 23.75   (Score > 14= high risk for readmission)    Discharging to Facility/ Agency   · Name:   · Address:  · Phone:  · Fax:    Dialysis Facility (if applicable)   · Name:  · Address:  · Dialysis Schedule:  · Phone:  · Fax:    / signature: {Esignature:134995475}      Important information for a smoker SMOKING: QUIT SMOKING. THIS IS THE MOST IMPORTANT ACTION YOU CAN TAKE TO IMPROVE YOUR CURRENT AND FUTURE HEALTH. Call the [address] [place] [address] at [place] NOW ([phone])    Smoking harms nonsmokers. When nonsmokers are around people who smoke, they absorb nicotine, carbon monoxide, and other ingredients of tobacco smoke. DO NOT SMOKE AROUND CHILDREN     Children exposed to secondhand smoke are at an increased risk of:  Sudden Infant Death Syndrome (SIDS), acute respiratory infections, inflammation of the middle ear, and severe asthma. Over a longer time, it causes heart disease and lung cancer. There is no safe level of exposure to secondhand smoke. [employer] Signup     Our records indicate that you have an active [employer] account. You can view your After Visit Summary by going to [email or web address] and logging in with your [employer] username and password. If you don't have a [employer] username and password but a parent or guardian has access to your record, the parent or guardian should login with their own [employer] username and password and access your record to view the After Visit Summary. Additional Information  If you have questions, please contact the physician practice where you receive care. Remember, [employer] is NOT to be used for urgent needs. For medical emergencies, dial 911. For questions regarding your [employer] account call [phone]. If you have a clinical question, please call your doctor's office. View your information online  ? Review your current list of  medications, immunization, and allergies. ? Review your future test results online . ?  Review your discharge instructions provided by your caregivers at discharge    Certain functionality such as prescription refills, scheduling appointments or sending messages to your provider are not activated if even if you feel well. High blood pressure often has no symptoms. You may need to use blood pressure medicine for the rest of your life. Your hypertension or heart condition may be treated with a combination of drugs. Use all medications as directed by your doctor. Read the medication guide or patient instructions provided with each medication. Do not change your doses or stop taking any of your medications without your doctor's advice. This is especially important if you also take nitroglycerin. Amlodipine is only part of a complete program of treatment that may also include diet, exercise, weight control, and other medications. Follow your diet, medication, and exercise routines very closely. Store at room temperature away from moisture, heat, and light. What happens if I miss a dose? Take the missed dose as soon as you remember. If you are more than 12 hours late, skip the missed dose. Do not  take extra medicine to make up the missed dose. What happens if I overdose? Seek emergency medical attention or call the Poison Help line at 1-650.449.8117. Overdose symptoms may include rapid heartbeats, redness or warmth in your arms or legs, or fainting. What should I avoid while taking amlodipine? Avoid getting up too fast from a sitting or lying position, or you may feel dizzy. Get up slowly and steady yourself to prevent a fall. What are the possible side effects of amlodipine? Get emergency medical help if you have signs of an allergic reaction:  hives; difficulty breathing; swelling of your face, lips, tongue, or throat. In rare cases, when you first start taking amlodipine, your angina may get worse or you could have a heart attack. Seek emergency medical attention or call your doctor right away if you have symptoms such as: chest pain or pressure, pain spreading to your jaw or shoulder, nausea, sweating.   Call your doctor at once if you have:  · pounding heartbeats or fluttering in your chest; · worsening chest pain;  · swelling in your feet or ankles;  · severe drowsiness; or  · a light-headed feeling, like you might pass out. Common side effects may include:  · dizziness;  · feeling tired;  · stomach pain, nausea; or  · flushing (warmth, redness, or tingly feeling). This is not a complete list of side effects and others may occur. Call your doctor for medical advice about side effects. You may report side effects to FDA at 3-473-KWB-6996. What other drugs will affect amlodipine? Tell your doctor about all your current medicines and any you start or stop using, especially:  · nitroglycerin;  · simvastatin (Zocor, Simcor, Vytorin); or  · any other heart or blood pressure medications. This list is not complete. Other drugs may interact with amlodipine, including prescription and over-the-counter medicines, vitamins, and herbal products. Not all possible interactions are listed in this medication guide. Where can I get more information? Your pharmacist can provide more information about amlodipine. Remember, keep this and all other medicines out of the reach of children, never share your medicines with others, and use this medication only for the indication prescribed. Every effort has been made to ensure that the information provided by [address] is accurate, up-to-date, and complete, but no guarantee is made to that effect. Drug information contained herein may be time sensitive. [facility] information has been compiled for use by healthcare practitioners and consumers in the [place] and therefore [facility] does not warrant that uses outside of the [place] are appropriate, unless specifically indicated otherwise. [facility]'s drug information does not endorse drugs, diagnose patients or recommend therapy.  [facility]'s drug information is an informational resource designed to assist licensed healthcare practitioners in caring for their patients license by [facility] ([facility]). If you have questions about a medical condition or this instruction, always ask your healthcare professional. [place] [address] any warranty or liability for your use of this information. Coronary Angiogram: What to Expect at [facility]    A coronary angiogram is a test to examine the large blood vessel of your heart (coronary artery). The doctor inserted a thin, flexible tube (catheter) into a blood vessel in your groin. In some cases, the catheter is placed in a blood vessel in the arm. Your groin or arm may have a bruise and feel sore for a day or two after a coronary angiogram. You can do light activities around the house but nothing strenuous for several days. This care sheet gives you a general idea about how long it will take for you to recover. But each person recovers at a different pace. Follow the steps below to feel better as quickly as possible. How can you care for yourself at home? Activity  · If the doctor gave you a sedative:  ¨ For 24 hours, don't do anything that requires attention to detail. It takes time for the medicine's effects to completely wear off. ¨ For your safety, do not drive or operate any machinery that could be dangerous. Wait until the medicine wears off and you can think clearly and react easily. · Do not do strenuous exercise and do not lift, pull, or push anything heavy until your doctor says it is okay. This may be for a day or two. You can walk around the house and do light activity, such as cooking. · If the catheter was placed in your groin, try not to walk up stairs for the first couple of days. · If the catheter was placed in your arm near your wrist, do not bend your wrist deeply for the first couple of days. Be careful using your hand to get into and out of a chair or bed. · If your doctor recommends it, get more exercise.  Walking is a good Follow-up care is a key part of your treatment and safety. Be sure to make and go to all appointments, and call your doctor if you are having problems. It's also a good idea to know your test results and keep a list of the medicines you take. When should you call for help? Call 911 anytime you think you may need emergency care. For example, call if:  · You passed out (lost consciousness). · You have severe trouble breathing. · You have sudden chest pain and shortness of breath, or you cough up blood. · You have symptoms of a heart attack. These may include:  ¨ Chest pain or pressure, or a strange feeling in the chest.  ¨ Sweating. ¨ Shortness of breath. ¨ Nausea or vomiting. ¨ Pain, pressure, or a strange feeling in the back, neck, jaw, or upper belly, or in one or both shoulders or arms. ¨ Lightheadedness or sudden weakness. ¨ A fast or irregular heartbeat. After you call 911, the  may tel you to chew 1 adult-strength or 2 to 4 low-dose aspirin. Wait for an ambulance. Do not try to drive yourself. · You have been diagnosed with angina, and you have symptoms that do not go away with rest or are not getting better within 5 minutes after you take a dose of nitroglycerin. Call your doctor now or seek immediate medical care if:  · You are bleeding from the area where the catheter was put in your artery. · You have a fast-growing, painful lump at the catheter site. · You have signs of infection, such as:  ¨ Increased pain, swelling, warmth, or redness. ¨ Red streaks leading from the catheter site. ¨ Pus draining from the catheter site. ¨ A fever. · Your leg or arm looks blue or feels cold, numb, or tingly. Watch closely for changes in your health, and be sure to contact your doctor if you have any problems. Where can you learn more? Go to [email or web address] and sign in to your [employer] account.  Enter L311 in the [email or web address] box to learn more

## 2017-09-16 LAB
CHOLESTEROL/HDL RATIO: 4.9
CHOLESTEROL: 214 MG/DL
EKG ATRIAL RATE: 94 BPM
EKG P AXIS: 28 DEGREES
EKG P-R INTERVAL: 134 MS
EKG Q-T INTERVAL: 402 MS
EKG QRS DURATION: 122 MS
EKG QTC CALCULATION (BAZETT): 502 MS
EKG R AXIS: -17 DEGREES
EKG T AXIS: 45 DEGREES
EKG VENTRICULAR RATE: 94 BPM
HDLC SERPL-MCNC: 44 MG/DL
LDL CHOLESTEROL: 140 MG/DL (ref 0–130)
MYOGLOBIN: <21 NG/ML (ref 25–58)
TRIGL SERPL-MCNC: 151 MG/DL
TROPONIN INTERP: ABNORMAL
TROPONIN T: <0.03 NG/ML
VLDLC SERPL CALC-MCNC: ABNORMAL MG/DL (ref 1–30)

## 2017-09-16 PROCEDURE — 6360000002 HC RX W HCPCS: Performed by: INTERNAL MEDICINE

## 2017-09-16 PROCEDURE — 94150 VITAL CAPACITY TEST: CPT

## 2017-09-16 PROCEDURE — 2580000003 HC RX 258: Performed by: INTERNAL MEDICINE

## 2017-09-16 PROCEDURE — G0378 HOSPITAL OBSERVATION PER HR: HCPCS

## 2017-09-16 PROCEDURE — 6370000000 HC RX 637 (ALT 250 FOR IP): Performed by: INTERNAL MEDICINE

## 2017-09-16 PROCEDURE — 84484 ASSAY OF TROPONIN QUANT: CPT

## 2017-09-16 PROCEDURE — 94760 N-INVAS EAR/PLS OXIMETRY 1: CPT

## 2017-09-16 PROCEDURE — 80061 LIPID PANEL: CPT

## 2017-09-16 PROCEDURE — 83874 ASSAY OF MYOGLOBIN: CPT

## 2017-09-16 PROCEDURE — 36415 COLL VENOUS BLD VENIPUNCTURE: CPT

## 2017-09-16 PROCEDURE — 94640 AIRWAY INHALATION TREATMENT: CPT

## 2017-09-16 RX ORDER — SODIUM CHLORIDE 0.9 % (FLUSH) 0.9 %
10 SYRINGE (ML) INJECTION EVERY 12 HOURS SCHEDULED
Status: DISCONTINUED | OUTPATIENT
Start: 2017-09-16 | End: 2017-09-20 | Stop reason: SDUPTHER

## 2017-09-16 RX ORDER — IBUPROFEN 200 MG
200 TABLET ORAL EVERY 6 HOURS PRN
Status: ON HOLD | COMMUNITY
End: 2017-11-20 | Stop reason: HOSPADM

## 2017-09-16 RX ORDER — ALBUTEROL SULFATE 2.5 MG/3ML
2.5 SOLUTION RESPIRATORY (INHALATION) 4 TIMES DAILY
Status: DISCONTINUED | OUTPATIENT
Start: 2017-09-16 | End: 2017-09-16

## 2017-09-16 RX ORDER — AMLODIPINE BESYLATE 5 MG/1
5 TABLET ORAL DAILY
Status: DISCONTINUED | OUTPATIENT
Start: 2017-09-16 | End: 2017-09-21 | Stop reason: HOSPADM

## 2017-09-16 RX ORDER — ONDANSETRON 2 MG/ML
4 INJECTION INTRAMUSCULAR; INTRAVENOUS EVERY 6 HOURS PRN
Status: DISCONTINUED | OUTPATIENT
Start: 2017-09-16 | End: 2017-09-21 | Stop reason: HOSPADM

## 2017-09-16 RX ORDER — ATORVASTATIN CALCIUM 40 MG/1
40 TABLET, FILM COATED ORAL NIGHTLY
Status: DISCONTINUED | OUTPATIENT
Start: 2017-09-16 | End: 2017-09-18

## 2017-09-16 RX ORDER — ASPIRIN 81 MG/1
81 TABLET ORAL DAILY
Status: DISCONTINUED | OUTPATIENT
Start: 2017-09-16 | End: 2017-09-21 | Stop reason: HOSPADM

## 2017-09-16 RX ORDER — SODIUM CHLORIDE 0.9 % (FLUSH) 0.9 %
10 SYRINGE (ML) INJECTION PRN
Status: DISCONTINUED | OUTPATIENT
Start: 2017-09-16 | End: 2017-09-20 | Stop reason: SDUPTHER

## 2017-09-16 RX ORDER — NITROGLYCERIN 0.4 MG/1
0.4 TABLET SUBLINGUAL EVERY 5 MIN PRN
Status: DISCONTINUED | OUTPATIENT
Start: 2017-09-16 | End: 2017-09-21 | Stop reason: HOSPADM

## 2017-09-16 RX ORDER — ALBUTEROL SULFATE 2.5 MG/3ML
2.5 SOLUTION RESPIRATORY (INHALATION)
Status: DISCONTINUED | OUTPATIENT
Start: 2017-09-16 | End: 2017-09-19

## 2017-09-16 RX ORDER — ACETAMINOPHEN 325 MG/1
650 TABLET ORAL EVERY 4 HOURS PRN
Status: DISCONTINUED | OUTPATIENT
Start: 2017-09-16 | End: 2017-09-20 | Stop reason: SDUPTHER

## 2017-09-16 RX ORDER — IPRATROPIUM BROMIDE AND ALBUTEROL SULFATE 2.5; .5 MG/3ML; MG/3ML
1 SOLUTION RESPIRATORY (INHALATION) 4 TIMES DAILY
Status: DISCONTINUED | OUTPATIENT
Start: 2017-09-16 | End: 2017-09-17

## 2017-09-16 RX ADMIN — Medication 10 ML: at 09:59

## 2017-09-16 RX ADMIN — IPRATROPIUM BROMIDE AND ALBUTEROL SULFATE 1 AMPULE: .5; 3 SOLUTION RESPIRATORY (INHALATION) at 20:16

## 2017-09-16 RX ADMIN — ASPIRIN 81 MG: 81 TABLET, COATED ORAL at 09:57

## 2017-09-16 RX ADMIN — HYDROMORPHONE HYDROCHLORIDE 0.5 MG: 1 INJECTION, SOLUTION INTRAMUSCULAR; INTRAVENOUS; SUBCUTANEOUS at 22:04

## 2017-09-16 RX ADMIN — ALBUTEROL SULFATE 2.5 MG: 2.5 SOLUTION RESPIRATORY (INHALATION) at 15:19

## 2017-09-16 RX ADMIN — ATORVASTATIN CALCIUM 40 MG: 40 TABLET, FILM COATED ORAL at 20:26

## 2017-09-16 RX ADMIN — AMLODIPINE BESYLATE 5 MG: 5 TABLET ORAL at 09:57

## 2017-09-16 RX ADMIN — ONDANSETRON 4 MG: 2 INJECTION INTRAMUSCULAR; INTRAVENOUS at 22:10

## 2017-09-16 RX ADMIN — METOPROLOL TARTRATE 25 MG: 25 TABLET ORAL at 20:26

## 2017-09-16 RX ADMIN — Medication 10 ML: at 20:26

## 2017-09-16 RX ADMIN — ACETAMINOPHEN 650 MG: 325 TABLET ORAL at 20:26

## 2017-09-16 ASSESSMENT — PAIN SCALES - GENERAL
PAINLEVEL_OUTOF10: 0
PAINLEVEL_OUTOF10: 9
PAINLEVEL_OUTOF10: 0
PAINLEVEL_OUTOF10: 0
PAINLEVEL_OUTOF10: 5
PAINLEVEL_OUTOF10: 0
PAINLEVEL_OUTOF10: 6
PAINLEVEL_OUTOF10: 0

## 2017-09-16 ASSESSMENT — PAIN DESCRIPTION - DESCRIPTORS
DESCRIPTORS: HEADACHE

## 2017-09-16 NOTE — PROGRESS NOTES
[facility]. Chest pain [R07.9] . Vitals:    09/16/17 1400   BP:    Pulse: 83   Resp:    Temp:    SpO2:    . Patients home meds are   Prior to Admission medications    Medication Sig Start Date End Date Taking? Authorizing Provider   aspirin 81 MG tablet Take 81 mg by mouth daily   Yes Historical Provider, MD   ibuprofen (ADVIL;MOTRIN) 200 MG tablet Take 200 mg by mouth every 6 hours as needed for Pain   Yes Historical Provider, MD   NITROSTAT 0.4 MG SL tablet DISSOLVE 1 TAB UNDER TONGUE FOR CHEST PAIN - IF PAIN REMAINS AFTER 5 MIN, CALL 911 AND REPEAT DOSE.  MAX 3 TABS IN 15 MINUTES 7/23/15  Yes Luci Alfonso MD   .  Recent Surgical History: None = 0     Assessment     Peak Flow (asthma only)    Predicted: na  Personal Best: na    % Predicted 58%  Peak Flow : 50-59% = 3    FEV1/FVC    FEV1 Predicted na      FEV1 0.8    FEV1 % Predicted 36%  FVC na  IS volume na  IBW na  FIO2% room air   SPO2 96%  RR 20  Breath Sounds: slight diminished      · Bronchodilator assessment at level  3  · Hyperinflation assessment at level   · Secretion Management assessment at level    ·   · []    Bronchodilator Assessment  BRONCHODILATOR ASSESSMENT SCORE  Score 0 1 2 3 4 5   Breath Sounds   []  Patient Baseline []  No Wheeze good aeration []  Faint, scattered wheezing, good aeration [x]  Expiratory Wheezing and or moderately diminished []  Insp/Exp wheeze and/or very diminished []  Insp/Exp and/ or marked distress   Respiratory Rate   []  Patient Baseline []  Less than 20 []  Less than 20 [x]  20-25 []  Greater than 25 []  Greater than 25   Peak flow % of Pred or PB []  NA   []  Greater than 90%  []  81-90% []  71-80% [x]  Less than or equal to 70%  or unable to perform []  Unable due to Respiratory Distress   Dyspnea re []  Patient Baseline []  No SOB []  No SOB [x]  SOB on exertion []  SOB min activity []  At rest/acute   e FEV% Predicted       []  NA []  Above 69%  []  Unable []  Above

## 2017-09-16 NOTE — H&P
History and Physical/admit note      CHIEF COMPLAINT:  Chest pain    History of Present Illness: 44-year-old white woman with known coronary artery disease and history of MI in the past came to the emergency room with substernal chest pressure rated as 7/10 with radiation to the left shoulder associated with diaphoresis lasting 5-10 minutes took 4 baby aspirins and called EMS they gave her sublingual nitroglycerin that relieved his pain, no recurrence of the pain. denies any heartburn or dysphagia nausea or vomiting no palpitation no shortness pain happened at rest no aggravating or relieving factors except with the medicine, patient denies any cough shortness of breath no PND no orthopnea        Past Medical History:   Diagnosis Date    Arthritis     CAD (coronary artery disease) 2006    heart attack x2    Carotid artery stenosis     Chronic back pain     Clotting disorder (HCC)     COPD (chronic obstructive pulmonary disease) ([address]     Disease of blood and blood forming organ     Drug abuse 9/2/2014    GERD (gastroesophageal reflux disease) 11/20/2012    H/O blood clots     Head injury     Heart attack ([address]     Hyperlipidemia     Hypertension     Prediabetes     Stomach ulcer          Past Surgical History:   Procedure Laterality Date    CORONARY ANGIOPLASTY WITH STENT PLACEMENT  2/14/2006,04/2013    3 stents total    CYST REMOVAL Right 9/26/2013    right foot    ENDOSCOPY, COLON, DIAGNOSTIC      stomach ulcer    HYSTERECTOMY  2005    total    SKIN BIOPSY      neg , on back   [address]       Medications Prior to Admission:    Prescriptions Prior to Admission: aspirin 81 MG tablet, Take 81 mg by mouth daily  ibuprofen (ADVIL;MOTRIN) 200 MG tablet, Take 200 mg by mouth every 6 hours as needed for Pain  NITROSTAT 0.4 MG SL tablet, DISSOLVE 1 TAB UNDER TONGUE FOR CHEST PAIN - IF PAIN REMAINS AFTER 5 MIN, CALL 911 AND REPEAT DOSE.  MAX 3 TABS IN 15 MINUTES    Allergies:    Latex; Codeine; and Tape [adhesive tape]    Social History:    reports that she has been smoking Cigarettes. She has a 9.25 pack-year smoking history. She has never used smokeless tobacco. She reports that she drinks alcohol. She reports that she uses illicit drugs, including Marijuana. Family History:   family history includes Diabetes in her mother; Heart Disease in her father and mother; High Blood Pressure in her father and mother; High Cholesterol in her father and mother; Other in her mother; Parkinsonism in her mother.     REVIEW OF SYSTEMS:  Constitutional: negative  Eyes: negative  Ears, nose, mouth, throat, and face: negative  Respiratory: negative  Cardiovascular: negative, See HPI  Gastrointestinal: negative, Occasional heartburn and no dysphagia  Genitourinary:negative  Integument/breast: negative  Hematologic/lymphatic: negative  Musculoskeletal:negative  Neurological: negative  Behavioral/Psych: negative  Endocrine: negative  Allergic/Immunologic: negative  PHYSICAL EXAM:  General Appearance: alert and oriented to person, place and time and in no acute distress  Skin: warm and dry, no rash or erythema  Head: normocephalic and atraumatic  Eyes: pupils equal, round, and reactive to light, conjunctivae normal and sclera anicteric    Neck: neck supple and non tender without mass   Pulmonary/Chest: clear to auscultation bilaterally- no wheezes, rales or rhonchi, normal air movement, no respiratory distress prolonged expiratory phase  Cardiovascular: normal rate, regular rhythm, normal S1 and S2, no gallops, intact distal pulses and no carotid bruits  Abdomen: soft, non-tender, non-distended, normal bowel sounds, no masses or organomegaly  Extremities: no edema and good pulses no Homans sign bunions  No chest wall tenderness  Neurologic: Alert and oriented ×3 no focal deficit    Vitals:  BP (!) 144/63  Pulse 75  Temp 97.1 °F (36.2 °C) (Temporal)   Resp 18  Ht 5' 1\" (1.549 m) Wt 167 lb 8 oz (76 kg)  SpO2 97%  BMI 31.65 kg/m2      LABS:  CBC:   Lab Results   Component Value Date    WBC 8.9 09/15/2017    RBC 4.74 09/15/2017    HGB 14.6 09/15/2017    HCT 42.7 09/15/2017    MCV 90.2 09/15/2017    MCH 30.7 09/15/2017    MCHC 34.1 09/15/2017    RDW 13.8 09/15/2017     09/15/2017    MPV 8.0 09/15/2017     CMP:    Lab Results   Component Value Date     09/15/2017    K 4.6 09/15/2017     09/15/2017    CO2 21 09/15/2017    BUN 24 09/15/2017    CREATININE 0.70 09/15/2017    GFRAA >60 09/15/2017    LABGLOM >60 09/15/2017    GLUCOSE 148 09/15/2017    PROT 6.3 08/25/2016    LABALBU 3.9 08/25/2016    CALCIUM 9.0 09/15/2017    BILITOT 0.53 08/25/2016    ALKPHOS 91 08/25/2016    AST 19 08/25/2016    ALT 15 08/25/2016         ASSESSMENT:    Angina at rest  Coronary artery disease  COPD  Chronic smoker  GERD  Hypertension essential  Patient Active Problem List   Diagnosis    CAD (coronary artery disease)    Chronic bronchitis (HCC)    Smoker    GERD (gastroesophageal reflux disease)    Thoracic scoliosis    Ganglion cyst    CAD S/P percutaneous coronary angioplasty (4/2013-Dr. Ed Samuel)    CAD S/P percutaneous coronary angioplasty-open stent( 7/29/2013-Dr. Almaz Clayton)    Chest pain    CAD (coronary artery disease)    HTN (hypertension)    Atypical chest pain    S/P - Mid RCA 10/17/14-Dr. nichols    S/P stents LAD, LCX and proximal RCA -     Hyperlipidemia    Chest pain     HLD (hyperlipidemia)    GERD (gastroesophageal reflux disease)    Chronic back pain    Precordial pain    Breast cancer screening    Lumbosacral pain    Low back pain with sciatica    Intervertebral disk disease    Midline low back pain without sciatica    Hip pain    Essential hypertension    Headache in front of head    Accelerated hypertension    Speech disturbance    RBBB (right bundle branch block)       PLAN:    Admit monitored area aspirin beta blockers Lipitor subcu Lovenox for DVT

## 2017-09-16 NOTE — FLOWSHEET NOTE
had a short visit with patient and prayer for healing and patience. Patient said they can't do stress test until Monday. Patient stated that she came in yesterday. Patient had no other needs at this time. Patient was receptive and grateful for my visit.     09/16/17 1046   Encounter Summary   Services provided to: Patient   Referral/Consult From: Amaury Boss Visiting (9/16/17)   Complexity of Encounter Low   Length of Encounter 15 minutes   Spiritual Assessment Completed Yes   Routine   Type Initial   Assessment Approachable   Intervention Active listening;Prayer;Sustaining presence/ Ministry of presence   Outcome Expressed gratitude;Engaged in conversation;Encouraged; Hopeful;Receptive

## 2017-09-16 NOTE — ED PROVIDER NOTES
[facility] ED  eMERGENCY dEPARTMENT eNCOUnter      Pt Name: Lorena Leonard  MRN: [id number]  [place] 1955  Date of evaluation: 9/15/2017  Provider: Lelia Aguirre MD    CHIEF COMPLAINT       Chief Complaint   Patient presents with    Chest Pain         HISTORY OF PRESENT ILLNESS  (Location/Symptom, Timing/Onset, Context/Setting, Quality, Duration, Modifying Factors, Severity.)   Lorena Leonard is a 60 y.o. female who presents to the emergency department For chest pain. She hasn't been feeling well for a month but the pain started tonight at 6:45 PM when she was sitting. It's in the middle part of her chest.  She was given aspirin and then a single nitroglycerin by the paramedics and her pain went away completely. At first she thought was indigestion but then realized that it probably isn't. She has a history of heart disease and has cardiac stents. She is pain-free now and has no shortness of breath. No dizziness or palpitations. Nursing Notes were reviewed. ALLERGIES     Latex; Codeine; and Tape [adhesive tape]    CURRENT MEDICATIONS       Previous Medications    AMLODIPINE (NORVASC) 5 MG TABLET    TAKE ONE TABLET BY MOUTH EVERY EVENING    ATORVASTATIN (LIPITOR) 40 MG TABLET    Take 1 tablet by mouth daily    CLOPIDOGREL (PLAVIX) 75 MG TABLET    Take 1 tablet by mouth daily    LISINOPRIL (PRINIVIL;ZESTRIL) 20 MG TABLET    TAKE ONE TABLET BY MOUTH DAILY    LORATADINE (CLARITIN) 10 MG TABLET    TAKE ONE TABLET BY MOUTH DAILY    METOPROLOL SUCCINATE ER (TOPROL-XL) 25 MG XL TABLET    Take 0.5 tablets by mouth every evening    NITROSTAT 0.4 MG SL TABLET    DISSOLVE 1 TAB UNDER TONGUE FOR CHEST PAIN - IF PAIN REMAINS AFTER 5 MIN, CALL 911 AND REPEAT DOSE.  MAX 3 TABS IN 15 MINUTES    OMEPRAZOLE (PRILOSEC) 20 MG CAPSULE    Take 1 capsule by mouth daily    ONDANSETRON (ZOFRAN) 4 MG TABLET    Take 1 tablet by mouth every 8 hours as needed for Nausea       PAST MEDICAL HISTORY         Diagnosis Date    Arthritis     CAD (coronary artery disease) 2006    heart attack x2    Carotid artery stenosis     Chronic back pain     Clotting disorder (HCC)     [facility]) [facility]     Disease of blood and blood forming organ     Drug abuse 2014    GERD (gastroesophageal reflux disease) 2012    H/O blood clots     Head injury     Heart attack ([facility]     Hyperlipidemia     Hypertension     Prediabetes     Stomach ulcer        SURGICAL HISTORY           Procedure Laterality Date    CORONARY ANGIOPLASTY WITH STENT PLACEMENT  2006,2013    3 stents total    CYST REMOVAL Right 2013    right foot    ENDOSCOPY, COLON, DIAGNOSTIC      stomach ulcer    HYSTERECTOMY      total    SKIN BIOPSY      neg , on back    TONSILLECTOMY  1960    TUBAL LIGATION  1979         FAMILY HISTORY           Problem Relation Age of Onset    Heart Disease Mother     Diabetes Mother     High Blood Pressure Mother     High Cholesterol Mother     Other Mother      graves disease    Parkinsonism Mother     Heart Disease Father     High Cholesterol Father     High Blood Pressure Father      Family Status   Relation Status    Mother     Father         SOCIAL HISTORY      reports that she has been smoking Cigarettes. She has a 9.25 pack-year smoking history. She has never used smokeless tobacco. She reports that she drinks alcohol. She reports that she uses illicit drugs, including Marijuana. REVIEW OF SYSTEMS    (2-9 systems for level 4, 10 or more for level 5)     Review of Systems   Constitutional: Positive for fatigue. Negative for chills and fever. HENT: Negative for congestion, ear discharge and facial swelling. Eyes: Negative for discharge and redness. Respiratory: Negative for cough and shortness of breath. Cardiovascular: Negative for chest pain. Gastrointestinal: Negative for abdominal pain, constipation, diarrhea and vomiting. Genitourinary: Negative for dysuria and hematuria. Musculoskeletal: Negative for arthralgias. Skin: Negative for color change and rash. Neurological: Negative for syncope, numbness and headaches. Hematological: Negative for adenopathy. Psychiatric/Behavioral: Negative for confusion. The patient is not nervous/anxious. Except as noted above the remainder of the review of systems was reviewed and negative. PHYSICAL EXAM    (up to 7 for level 4, 8 or more for level 5)     Vitals:    09/15/17 2036   BP: (!) 147/82   Pulse: 87   Resp: (!) 33   Temp: 97.7 °F (36.5 °C)   SpO2: 96%   Weight: 170 lb (77.1 kg)   Height: 5' 1\" (1.549 m)       Physical Exam   Constitutional: She is oriented to person, place, and time. She appears well-developed and well-nourished. No distress. HENT:   Head: Normocephalic and atraumatic. Eyes: Right eye exhibits no discharge. Left eye exhibits no discharge. No scleral icterus. Neck: Neck supple. Cardiovascular: Normal rate and regular rhythm. Pulmonary/Chest: Effort normal and breath sounds normal. No stridor. No respiratory distress. She has no wheezes. She has no rales. Abdominal: Soft. She exhibits no distension. There is no tenderness. Musculoskeletal: Normal range of motion. Lymphadenopathy:     She has no cervical adenopathy. Neurological: She is alert and oriented to person, place, and time. Skin: Skin is warm and dry. No rash noted. She is not diaphoretic. No erythema. Psychiatric: She has a normal mood and affect. Her behavior is normal.   Vitals reviewed. DIAGNOSTIC RESULTS     EKG: All EKG's are interpreted by the Emergency Department Physician who either signs or Co-signs this chart in the absence of a cardiologist.    EKG on my interpretation shows sinus rhythm with a rate of 94. Right bundle branch block present. No acute change.     RADIOLOGY:   Non-plain film images such as CT, Ultrasound and MRI are read by the radiologist.

## 2017-09-16 NOTE — FLOWSHEET NOTE
09/16/17 1532   Oxygen Therapy   SpO2 97 %   Pulse Oximeter Device Mode Intermittent   Pulse Oximeter Device Location Finger   O2 Device None (Room air)   Patient completed respiratory treatment at this time.

## 2017-09-16 NOTE — CONSULTS
lisinopril (PRINIVIL;ZESTRIL) 20 MG tablet TAKE ONE TABLET BY MOUTH DAILY 2/12/17  Yes Fay Alberts MD   metoprolol succinate ER (TOPROL-XL) 25 MG XL tablet Take 0.5 tablets by mouth every evening 9/1/16  Yes Fay Alberts MD   amLODIPine (NORVASC) 5 MG tablet TAKE ONE TABLET BY MOUTH EVERY EVENING 9/1/16  Yes Fay Alberts MD   atorvastatin (LIPITOR) 40 MG tablet Take 1 tablet by mouth daily 9/1/16  Yes Fay Alberts MD   omeprazole (PRILOSEC) 20 MG capsule Take 1 capsule by mouth daily 9/1/16  Yes Fay Alberts MD   NITROSTAT 0.4 MG SL tablet DISSOLVE 1 TAB UNDER TONGUE FOR CHEST PAIN - IF PAIN REMAINS AFTER 5 MIN, CALL 911 AND REPEAT DOSE. MAX 3 TABS IN 15 MINUTES 7/23/15  Yes Luci Alfonso MD       Allergies:  Latex; Codeine; and Tape [adhesive tape]    Social History:   reports that she has been smoking Cigarettes. She has a 9.25 pack-year smoking history. She has never used smokeless tobacco. She reports that she drinks alcohol. She reports that she uses illicit drugs, including Marijuana. Family History: family history includes Diabetes in her mother; Heart Disease in her father and mother; High Blood Pressure in her father and mother; High Cholesterol in her father and mother; Other in her mother; Parkinsonism in her mother. No h/o sudden cardiac death. No for premature CAD    REVIEW OF SYSTEMS:    · Constitutional: there has been no unanticipated weight loss. There's been No change in energy level, No change in activity level. · Eyes: No visual changes or diplopia. No scleral icterus. · ENT: No Headaches, hearing loss or vertigo. No mouth sores or sore throat. · Cardiovascular: see above  · Respiratory: see above  · Gastrointestinal: No abdominal pain, appetite loss, blood in stools. · Genitourinary: No dysuria, trouble voiding, or hematuria. · Musculoskeletal:  No gait disturbance, No weakness or joint complaints. · Integumentary: No rash or pruritis.   · Neurological: No headache or diplopia. No tingling  · Psychiatric: No anxiety, or depression. · Endocrine: No temperature intolerance. · Hematologic/Lymphatic: No abnormal bruising or bleeding, blood clots or swollen lymph nodes. · Allergic/Immunologic: No nasal congestion or hives. PHYSICAL EXAM:      BP (!) 161/88  Pulse 77  Temp 98.2 °F (36.8 °C) (Temporal)   Resp 16  Ht 5' 1\" (1.549 m)  Wt 167 lb 8 oz (76 kg)  SpO2 97%  BMI 31.65 kg/m2   Constitutional and General Appearance: alert, cooperative, no distress and appears stated age  HEENT: PERRL, no cervical lymphadenopathy. No masses palpable. Normal oral mucosa  Respiratory:  · Normal excursion and expansion without use of accessory muscles  · Resp Auscultation: Good respiratory effort. No for increased work of breathing. On auscultation: clear to auscultation bilaterally  Cardiovascular:  · Heart tones are crisp and normal. regular S1 and S2.  · Jugular venous pulsation Normal  · The carotid upstroke is normal in amplitude and contour without delay or bruit   Abdomen:   · soft  · Bowel sounds present  Extremities:  ·  No edema  Neurological:  · Alert and oriented. DATA:    Diagnostics:    EKG: NSR, RBBB, septal infarction    TTE 08/26/16:  Summary  Left ventricle is normal in size. Overall left ventricular systolic function appears to be mildly reduced;with  moderate anteroseptal hypokinesis, Estimated left ventricular ejection  fraction 45-50% range. Mild jamie-apical and infero-apical wall motion abnormality. Mildly increased wall thickness of the left ventricle. Grade I (mild) left ventricular diastolic dysfunction. Mild mitral regurgitation. Mild tricuspid regurgitation. No significant pericardial effusion is seen. Cardiac cath 10/20/14   Patent LAD, LCX and RCA proximal stents   100% mid RCA stenosis required AVA   Lower normal LV function    Nuclear stress test 11/09/15: large anterior infarction.     Labs:     CBC:   Recent Labs      09/15/17   2036

## 2017-09-16 NOTE — PLAN OF CARE
Problem: Falls - Risk of  Goal: Absence of falls  Outcome: Ongoing  Patient remains free from falls; bed locked in lowest position; side rails x2.  Call light remains in reach; will continue to monitor

## 2017-09-17 PROBLEM — J44.9 COPD (CHRONIC OBSTRUCTIVE PULMONARY DISEASE) (HCC): Status: ACTIVE | Noted: 2017-09-17

## 2017-09-17 PROBLEM — F17.200 SMOKER: Status: ACTIVE | Noted: 2017-09-17

## 2017-09-17 LAB
ABSOLUTE EOS #: 0.2 K/UL (ref 0–0.4)
ABSOLUTE LYMPH #: 2.4 K/UL (ref 1–4.8)
ABSOLUTE MONO #: 0.6 K/UL (ref 0.2–0.8)
ALBUMIN SERPL-MCNC: 3.8 G/DL (ref 3.5–5.2)
ALBUMIN/GLOBULIN RATIO: ABNORMAL (ref 1–2.5)
ALP BLD-CCNC: 92 U/L (ref 35–104)
ALT SERPL-CCNC: 12 U/L (ref 5–33)
ANION GAP SERPL CALCULATED.3IONS-SCNC: 13 MMOL/L (ref 9–17)
AST SERPL-CCNC: 16 U/L
BASOPHILS # BLD: 1 %
BASOPHILS ABSOLUTE: 0.1 K/UL (ref 0–0.2)
BILIRUB SERPL-MCNC: 0.46 MG/DL (ref 0.3–1.2)
BUN BLDV-MCNC: 22 MG/DL (ref 8–23)
BUN/CREAT BLD: 34 (ref 9–20)
CALCIUM SERPL-MCNC: 9 MG/DL (ref 8.6–10.4)
CHLORIDE BLD-SCNC: 101 MMOL/L (ref 98–107)
CO2: 25 MMOL/L (ref 20–31)
CREAT SERPL-MCNC: 0.65 MG/DL (ref 0.5–0.9)
DIFFERENTIAL TYPE: NORMAL
EOSINOPHILS RELATIVE PERCENT: 3 %
GFR AFRICAN AMERICAN: >60 ML/MIN
GFR NON-AFRICAN AMERICAN: >60 ML/MIN
GFR SERPL CREATININE-BSD FRML MDRD: ABNORMAL ML/MIN/{1.73_M2}
GFR SERPL CREATININE-BSD FRML MDRD: ABNORMAL ML/MIN/{1.73_M2}
GLUCOSE BLD-MCNC: 102 MG/DL (ref 70–99)
HCT VFR BLD CALC: 40.7 % (ref 36–46)
HEMOGLOBIN: 13.8 G/DL (ref 12–16)
LYMPHOCYTES # BLD: 31 %
MCH RBC QN AUTO: 30.7 PG (ref 26–34)
MCHC RBC AUTO-ENTMCNC: 33.8 G/DL (ref 31–37)
MCV RBC AUTO: 90.7 FL (ref 80–100)
MONOCYTES # BLD: 8 %
PDW BLD-RTO: 13.6 % (ref 11.5–14.5)
PLATELET # BLD: 209 K/UL (ref 130–400)
PLATELET ESTIMATE: NORMAL
PMV BLD AUTO: 8.1 FL (ref 6–12)
POTASSIUM SERPL-SCNC: 4.8 MMOL/L (ref 3.7–5.3)
RBC # BLD: 4.49 M/UL (ref 4–5.2)
RBC # BLD: NORMAL 10*6/UL
SEG NEUTROPHILS: 57 %
SEGMENTED NEUTROPHILS ABSOLUTE COUNT: 4.4 K/UL (ref 1.8–7.7)
SODIUM BLD-SCNC: 139 MMOL/L (ref 135–144)
TOTAL PROTEIN: 6.4 G/DL (ref 6.4–8.3)
WBC # BLD: 7.7 K/UL (ref 3.5–11)
WBC # BLD: NORMAL 10*3/UL

## 2017-09-17 PROCEDURE — G0378 HOSPITAL OBSERVATION PER HR: HCPCS

## 2017-09-17 PROCEDURE — 2580000003 HC RX 258: Performed by: INTERNAL MEDICINE

## 2017-09-17 PROCEDURE — 36415 COLL VENOUS BLD VENIPUNCTURE: CPT

## 2017-09-17 PROCEDURE — 85025 COMPLETE CBC W/AUTO DIFF WBC: CPT

## 2017-09-17 PROCEDURE — 6370000000 HC RX 637 (ALT 250 FOR IP): Performed by: INTERNAL MEDICINE

## 2017-09-17 PROCEDURE — 6360000002 HC RX W HCPCS: Performed by: INTERNAL MEDICINE

## 2017-09-17 PROCEDURE — 80053 COMPREHEN METABOLIC PANEL: CPT

## 2017-09-17 PROCEDURE — 94760 N-INVAS EAR/PLS OXIMETRY 1: CPT

## 2017-09-17 PROCEDURE — 94640 AIRWAY INHALATION TREATMENT: CPT

## 2017-09-17 RX ORDER — IBUPROFEN 200 MG
400 TABLET ORAL EVERY 8 HOURS PRN
Status: DISCONTINUED | OUTPATIENT
Start: 2017-09-17 | End: 2017-09-21 | Stop reason: HOSPADM

## 2017-09-17 RX ORDER — PANTOPRAZOLE SODIUM 40 MG/1
40 TABLET, DELAYED RELEASE ORAL
Status: DISCONTINUED | OUTPATIENT
Start: 2017-09-18 | End: 2017-09-21 | Stop reason: HOSPADM

## 2017-09-17 RX ORDER — IPRATROPIUM BROMIDE AND ALBUTEROL SULFATE 2.5; .5 MG/3ML; MG/3ML
1 SOLUTION RESPIRATORY (INHALATION) 3 TIMES DAILY
Status: DISCONTINUED | OUTPATIENT
Start: 2017-09-17 | End: 2017-09-19

## 2017-09-17 RX ADMIN — IPRATROPIUM BROMIDE AND ALBUTEROL SULFATE 1 AMPULE: .5; 3 SOLUTION RESPIRATORY (INHALATION) at 14:42

## 2017-09-17 RX ADMIN — Medication 10 ML: at 10:39

## 2017-09-17 RX ADMIN — IPRATROPIUM BROMIDE AND ALBUTEROL SULFATE 1 AMPULE: .5; 3 SOLUTION RESPIRATORY (INHALATION) at 20:13

## 2017-09-17 RX ADMIN — Medication 10 ML: at 21:09

## 2017-09-17 RX ADMIN — ONDANSETRON 4 MG: 2 INJECTION INTRAMUSCULAR; INTRAVENOUS at 21:06

## 2017-09-17 RX ADMIN — ASPIRIN 81 MG: 81 TABLET, COATED ORAL at 10:39

## 2017-09-17 RX ADMIN — IBUPROFEN 400 MG: 200 TABLET, FILM COATED ORAL at 12:52

## 2017-09-17 RX ADMIN — ATORVASTATIN CALCIUM 40 MG: 40 TABLET, FILM COATED ORAL at 21:24

## 2017-09-17 RX ADMIN — IPRATROPIUM BROMIDE AND ALBUTEROL SULFATE 1 AMPULE: .5; 3 SOLUTION RESPIRATORY (INHALATION) at 07:35

## 2017-09-17 RX ADMIN — AMLODIPINE BESYLATE 5 MG: 5 TABLET ORAL at 10:40

## 2017-09-17 RX ADMIN — IPRATROPIUM BROMIDE AND ALBUTEROL SULFATE 1 AMPULE: .5; 3 SOLUTION RESPIRATORY (INHALATION) at 11:22

## 2017-09-17 ASSESSMENT — PAIN SCALES - GENERAL
PAINLEVEL_OUTOF10: 0
PAINLEVEL_OUTOF10: 8
PAINLEVEL_OUTOF10: 0
PAINLEVEL_OUTOF10: 4
PAINLEVEL_OUTOF10: 0
PAINLEVEL_OUTOF10: 0

## 2017-09-17 ASSESSMENT — PAIN DESCRIPTION - DESCRIPTORS: DESCRIPTORS: HEADACHE

## 2017-09-17 NOTE — PLAN OF CARE
Problem: Falls - Risk of  Goal: Absence of falls  Outcome: Ongoing  Patient remains free from falls; bed locked in lowest position; side rails x2. Call light remains in reach; will continue to monitor        Problem: Pain:  Goal: Pain level will decrease  Pain level will decrease   Outcome: Ongoing  Patient has complaints of headache/migraine. Treated with tylenol/dilaudid, will continue to monitor.

## 2017-09-17 NOTE — PLAN OF CARE
Problem: Falls - Risk of  Goal: Absence of falls  Outcome: Ongoing  Pt remains free of falls and verbalizes understanding of individual fall risks. Pt wearing non skid footwear, bed locked and in lowest position. Side rails up 2/4. Pt has call light and tray table within reach and encouraged to seek out staff for any assistance needed. Pt uses call light appropriately. Problem: Pain:  Goal: Control of acute pain  Control of acute pain   Outcome: Ongoing  Patient active participant in control of pain, states interventions are adequately meeting patient's needs for the duration of the shift.

## 2017-09-17 NOTE — PROGRESS NOTES
60-69%  []  Unable [x]  Above 50-59%  []  Unable []  Above 35-49%  []  Unable []  Less than 35%  []  Unable                 []  Hyperinflation Assessment  Score 1 2 3   CXR and Breath Sounds   []  Clear []  No atelectasis  Basilar aeration []  Atelectasis or absent basilar breath sounds   Incentive Spirometry Volume  (Per IBW)   []  Greater than or equal to 15ml/Kg []  less than 15ml/Kg []  less than 15ml/Kg   Surgery within last 2 weeks []  None or general   []  Abdominal or thoracic surgery  []  Abdominal or thoracic   Chronic Pulmonary Historyre []  No []  Yes []  Yes     []  Secretion Management Assessment  Score 1 2 3   Bilateral Breath Sounds   []  Occasional Rhonchi []  Scattered Rhonchi []  Course Rhonchi and/or poor aeration   Sputum    []  Small amount of thin secretions []  Moderate amount of viscous secretions []  Copius, Viscious Yellow/ Secretions   CXR as reported by physician []  clear  []  Unavailable []  Infiltrates and/or consolidation  []  Unavailable []  Mucus Plugging and or lobar consolidation  []  Unavailable   Cough []  Strong, productive cough []  Weak productive cough []  No cough or weak non-productive cough

## 2017-09-17 NOTE — FLOWSHEET NOTE
09/17/17 0830   Provider Notification   Reason for Communication Review case   Provider Name Dr. Latrell Lema   Provider Notification Physician   Method of Communication Face to face   Response In department   Notification Time 0830   MD rounded at this time. Per MD:   No coffee at all, including decaf. No caffeine at all prior to stress test.   Pt to be NPO at midnight.

## 2017-09-17 NOTE — PROGRESS NOTES
Subjective:     Follow-up chest pain no more chest pain no palpitations no dizziness no syncopal episodes no cough no shortness of breath ROS  No fever no chills diarrhea  complaints no cardiopulmonary complaint the headache yesterday which is better today frontal no TIAs and no bleeding no polyuria polydipsia  physical exam  General Appearance: alert and oriented to person, place and time and in no acute distress  Skin: warm and dry, no rash or erythema  Head: normocephalic and atraumatic  Eyes: pupils equal, round, and reactive to light, conjunctivae normal and sclera anicteric    Neck: neck supple and non tender without mass   Pulmonary/Chest: clear to auscultation bilaterally- no wheezes, rales or rhonchi, normal air movement, no respiratory distress prolonged expiratory phase  Cardiovascular: normal rate, regular rhythm, no gallops, intact distal pulses and no carotid bruits  Abdomen: soft, non-tender, non-distended, normal bowel sounds, no masses or organomegaly  Extremities: no edema and good pulses no Jayde sign  Neurologic: Alert and oriented ×3 no focal deficit    /80  Pulse 70  Temp 98 °F (36.7 °C) (Temporal)   Resp 16  Ht 5' 1\" (1.549 m)  Wt 165 lb 3.2 oz (74.9 kg)  SpO2 96%  BMI 31.21 kg/m2    CBC:   Lab Results   Component Value Date    WBC 7.7 09/17/2017    RBC 4.49 09/17/2017    HGB 13.8 09/17/2017    HCT 40.7 09/17/2017    MCV 90.7 09/17/2017    MCH 30.7 09/17/2017    MCHC 33.8 09/17/2017    RDW 13.6 09/17/2017     09/17/2017    MPV 8.1 09/17/2017     BMP:    Lab Results   Component Value Date     09/17/2017    K 4.8 09/17/2017     09/17/2017    CO2 25 09/17/2017    BUN 22 09/17/2017    LABALBU 3.8 09/17/2017    CREATININE 0.65 09/17/2017    CALCIUM 9.0 09/17/2017    GFRAA >60 09/17/2017    LABGLOM >60 09/17/2017    GLUCOSE 102 09/17/2017        Assessment:  Patient Active Problem List   Diagnosis    CAD (coronary artery disease)    Chronic bronchitis ([facility] [address]    Smoker    GERD (gastroesophageal reflux disease)    Thoracic scoliosis    Ganglion cyst    CAD S/P percutaneous coronary angioplasty (4/2013-Dr. Lucero Case)    CAD S/P percutaneous coronary angioplasty-open stent( 7/29/2013-Dr. nichols)    Chest pain    CAD (coronary artery disease)    HTN (hypertension)    Atypical chest pain    S/P - Mid RCA 10/17/14-Dr. nichols    S/P stents LAD, LCX and proximal RCA -     Hyperlipidemia    Chest pain     HLD (hyperlipidemia)    GERD (gastroesophageal reflux disease)    Chronic back pain    Precordial pain    Breast cancer screening    Lumbosacral pain    Low back pain with sciatica    Intervertebral disk disease    Midline low back pain without sciatica    Hip pain    Essential hypertension    Headache in front of head    Accelerated hypertension    Speech disturbance    RBBB (right bundle branch block)    COPD (chronic obstructive pulmonary disease) (HCC)     Chest pain is likely angina  Coronary artery disease stable  COPD stable  Smoker advised smoking cessation options discussed with the patient  Headache frontal results  Obesity BMI 30-34.9  Hypercholesterolemia and Lipitor  Plan:    Meds labs reviewed patient wants Motrin for headaches stress test in the morning      Jm Rodriguez MD  11:14 AM

## 2017-09-18 ENCOUNTER — APPOINTMENT (OUTPATIENT)
Dept: NUCLEAR MEDICINE | Age: 62
DRG: 280 | End: 2017-09-18
Payer: MEDICARE

## 2017-09-18 LAB
ABSOLUTE EOS #: 0.2 K/UL (ref 0–0.4)
ABSOLUTE LYMPH #: 1.9 K/UL (ref 1–4.8)
ABSOLUTE MONO #: 0.7 K/UL (ref 0.2–0.8)
BASOPHILS # BLD: 0 %
BASOPHILS ABSOLUTE: 0 K/UL (ref 0–0.2)
DIFFERENTIAL TYPE: NORMAL
EOSINOPHILS RELATIVE PERCENT: 3 %
HCT VFR BLD CALC: 38.8 % (ref 36–46)
HEMOGLOBIN: 13.2 G/DL (ref 12–16)
LV EF: 27 %
LVEF MODALITY: NORMAL
LYMPHOCYTES # BLD: 25 %
MCH RBC QN AUTO: 31 PG (ref 26–34)
MCHC RBC AUTO-ENTMCNC: 33.9 G/DL (ref 31–37)
MCV RBC AUTO: 91.3 FL (ref 80–100)
MONOCYTES # BLD: 9 %
PDW BLD-RTO: 13.8 % (ref 11.5–14.5)
PLATELET # BLD: 197 K/UL (ref 130–400)
PLATELET ESTIMATE: NORMAL
PMV BLD AUTO: 7.8 FL (ref 6–12)
RBC # BLD: 4.25 M/UL (ref 4–5.2)
RBC # BLD: NORMAL 10*6/UL
SEG NEUTROPHILS: 63 %
SEGMENTED NEUTROPHILS ABSOLUTE COUNT: 4.8 K/UL (ref 1.8–7.7)
WBC # BLD: 7.6 K/UL (ref 3.5–11)
WBC # BLD: NORMAL 10*3/UL

## 2017-09-18 PROCEDURE — 6370000000 HC RX 637 (ALT 250 FOR IP): Performed by: INTERNAL MEDICINE

## 2017-09-18 PROCEDURE — 94640 AIRWAY INHALATION TREATMENT: CPT

## 2017-09-18 PROCEDURE — 93017 CV STRESS TEST TRACING ONLY: CPT

## 2017-09-18 PROCEDURE — 2060000000 HC ICU INTERMEDIATE R&B

## 2017-09-18 PROCEDURE — 2580000003 HC RX 258: Performed by: INTERNAL MEDICINE

## 2017-09-18 PROCEDURE — 3430000000 HC RX DIAGNOSTIC RADIOPHARMACEUTICAL: Performed by: INTERNAL MEDICINE

## 2017-09-18 PROCEDURE — A9500 TC99M SESTAMIBI: HCPCS | Performed by: INTERNAL MEDICINE

## 2017-09-18 PROCEDURE — 6360000002 HC RX W HCPCS: Performed by: INTERNAL MEDICINE

## 2017-09-18 PROCEDURE — 78452 HT MUSCLE IMAGE SPECT MULT: CPT

## 2017-09-18 PROCEDURE — 36415 COLL VENOUS BLD VENIPUNCTURE: CPT

## 2017-09-18 PROCEDURE — 85025 COMPLETE CBC W/AUTO DIFF WBC: CPT

## 2017-09-18 RX ORDER — METOPROLOL TARTRATE 5 MG/5ML
2.5 INJECTION INTRAVENOUS PRN
Status: ACTIVE | OUTPATIENT
Start: 2017-09-18 | End: 2017-09-18

## 2017-09-18 RX ORDER — SODIUM CHLORIDE 0.9 % (FLUSH) 0.9 %
10 SYRINGE (ML) INJECTION PRN
Status: ACTIVE | OUTPATIENT
Start: 2017-09-18 | End: 2017-09-18

## 2017-09-18 RX ORDER — SODIUM CHLORIDE 9 MG/ML
INJECTION, SOLUTION INTRAVENOUS ONCE
Status: DISCONTINUED | OUTPATIENT
Start: 2017-09-18 | End: 2017-09-21 | Stop reason: HOSPADM

## 2017-09-18 RX ORDER — AMINOPHYLLINE DIHYDRATE 25 MG/ML
100 INJECTION, SOLUTION INTRAVENOUS
Status: COMPLETED | OUTPATIENT
Start: 2017-09-18 | End: 2017-09-18

## 2017-09-18 RX ORDER — NITROGLYCERIN 0.4 MG/1
0.4 TABLET SUBLINGUAL EVERY 5 MIN PRN
Status: ACTIVE | OUTPATIENT
Start: 2017-09-18 | End: 2017-09-18

## 2017-09-18 RX ORDER — ATORVASTATIN CALCIUM 80 MG/1
80 TABLET, FILM COATED ORAL NIGHTLY
Status: DISCONTINUED | OUTPATIENT
Start: 2017-09-18 | End: 2017-09-21 | Stop reason: HOSPADM

## 2017-09-18 RX ADMIN — IBUPROFEN 400 MG: 200 TABLET, FILM COATED ORAL at 22:30

## 2017-09-18 RX ADMIN — Medication 10 ML: at 20:28

## 2017-09-18 RX ADMIN — REGADENOSON 0.4 MG: 0.08 INJECTION, SOLUTION INTRAVENOUS at 08:54

## 2017-09-18 RX ADMIN — Medication 10 ML: at 09:00

## 2017-09-18 RX ADMIN — TETRAKIS(2-METHOXYISOBUTYLISOCYANIDE)COPPER(I) TETRAFLUOROBORATE 19.9 MILLICURIE: 1 INJECTION, POWDER, LYOPHILIZED, FOR SOLUTION INTRAVENOUS at 08:55

## 2017-09-18 RX ADMIN — IPRATROPIUM BROMIDE AND ALBUTEROL SULFATE 1 AMPULE: .5; 3 SOLUTION RESPIRATORY (INHALATION) at 21:04

## 2017-09-18 RX ADMIN — ASPIRIN 81 MG: 81 TABLET, COATED ORAL at 09:00

## 2017-09-18 RX ADMIN — TETRAKIS(2-METHOXYISOBUTYLISOCYANIDE)COPPER(I) TETRAFLUOROBORATE 39.3 MILLICURIE: 1 INJECTION, POWDER, LYOPHILIZED, FOR SOLUTION INTRAVENOUS at 13:05

## 2017-09-18 RX ADMIN — Medication 10 ML: at 08:54

## 2017-09-18 RX ADMIN — ATORVASTATIN CALCIUM 80 MG: 80 TABLET, FILM COATED ORAL at 20:27

## 2017-09-18 RX ADMIN — METOPROLOL TARTRATE 25 MG: 25 TABLET ORAL at 22:29

## 2017-09-18 RX ADMIN — AMINOPHYLLINE 50 MG: 25 INJECTION, SOLUTION INTRAVENOUS at 09:03

## 2017-09-18 RX ADMIN — IPRATROPIUM BROMIDE AND ALBUTEROL SULFATE 1 AMPULE: .5; 3 SOLUTION RESPIRATORY (INHALATION) at 14:39

## 2017-09-18 RX ADMIN — AMLODIPINE BESYLATE 5 MG: 5 TABLET ORAL at 09:00

## 2017-09-18 ASSESSMENT — PAIN SCALES - GENERAL
PAINLEVEL_OUTOF10: 0
PAINLEVEL_OUTOF10: 6
PAINLEVEL_OUTOF10: 0

## 2017-09-18 ASSESSMENT — PAIN DESCRIPTION - ONSET: ONSET: GRADUAL

## 2017-09-18 ASSESSMENT — PAIN DESCRIPTION - LOCATION: LOCATION: HEAD

## 2017-09-18 ASSESSMENT — PAIN DESCRIPTION - DESCRIPTORS: DESCRIPTORS: HEADACHE

## 2017-09-18 ASSESSMENT — PAIN DESCRIPTION - PAIN TYPE: TYPE: ACUTE PAIN

## 2017-09-18 ASSESSMENT — PAIN DESCRIPTION - PROGRESSION: CLINICAL_PROGRESSION: NOT CHANGED

## 2017-09-18 ASSESSMENT — PAIN DESCRIPTION - FREQUENCY: FREQUENCY: INTERMITTENT

## 2017-09-18 NOTE — PLAN OF CARE
Problem: Falls - Risk of  Goal: Absence of falls  Outcome: Ongoing  Pt remains free of falls and verbalizes understanding of individual fall risks. Pt wearing non skid footwear, bed locked and in lowest position. Side rails up 2/4. Pt has call light and tray table within reach and encouraged to seek out staff for any assistance needed. Pt uses call light appropriately. Problem: Pain:  Goal: Control of acute pain  Control of acute pain   Outcome: Ongoing  Patient active participant in control of pain, states interventions are adequately meeting patient's needs for the duration of the shift. Goal: Control of chronic pain  Control of chronic pain   Outcome: Ongoing  Patient active participant in control of pain, states interventions are adequately meeting patient's needs for the duration of the shift.

## 2017-09-18 NOTE — FLOWSHEET NOTE
Patient resting in bed; states she just returned from a stress test; states she has to go back for the second half of the test later. Patient requests prayer that the test is okay; expresses strong desire to be well and to be discharged. Patient is pleasant and receptive to Spiritual Care.  provides listening presence, supportive conversation, and prayer. Patient expresses gratitude for  visit. Spiritual Care will follow as needed.      09/18/17 1231   Encounter Summary   Services provided to: Patient   Referral/Consult From: Rounding   Place of Mosque None   Continue Visiting (9/18/17)   Complexity of Encounter Low   Length of Encounter 15 minutes   Routine   Type Follow up   Assessment Approachable;Coping   Intervention Active listening;Explored feelings, thoughts, concerns;Prayer   Outcome Expressed gratitude;Engaged in conversation;Receptive

## 2017-09-18 NOTE — FLOWSHEET NOTE
09/18/17 1752   Provider Notification   Reason for Communication Review case   Provider Name Dr. Concepcion Orlando   Provider Notification Physician   Method of Communication Call   Response See orders   Notification Time 56   Called MD to advise of positive stress test.      Per MD: Place pt NPO at midnight for possible cath tomorrow.

## 2017-09-18 NOTE — PLAN OF CARE
Problem: Falls - Risk of  Goal: Absence of falls  Outcome: Met This Shift  Patient is a fall risk during this admission. Fall risk assessment was performed. Patient is absent of falls. Bed is in the lowest position. Wheels on the bed are locked. Call light and bed side table are within reach. Clutter is removed. Patient was educated to call out when needing assistance or wanting to get out of bed. Patient offered toileting assistance during rounding. Hourly rounds have been performed. Problem: Pain:  Goal: Pain level will decrease  Pain level will decrease   Outcome: Met This Shift  Patient has denied pain during shift.

## 2017-09-18 NOTE — PROGRESS NOTES
[facility]   Progress Note                   Date:   9/18/2017  Patient name: Roslyn Alston  Date of admission:  9/15/2017  8:26 PM  MRN:   [id number]  YOB: 1955  PCP: Mike Carpio MD    Reason for Admission: Chest pain [R07.9]    Subjective:       Clinical Changes / Abnormalities: no chest pain or dyspnea. Medications:   Scheduled Meds:   pantoprazole  40 mg Oral QAM AC    ipratropium-albuterol  1 ampule Inhalation TID    aspirin  81 mg Oral Daily    amLODIPine  5 mg Oral Daily    metoprolol tartrate  25 mg Oral BID    atorvastatin  40 mg Oral Nightly    sodium chloride flush  10 mL Intravenous 2 times per day    enoxaparin  40 mg Subcutaneous Daily     Continuous Infusions:   sodium chloride       CBC:   Recent Labs      09/15/17   2036  09/17/17   0534  09/18/17   0625   WBC  8.9  7.7  7.6   HGB  14.6  13.8  13.2   PLT  214  209  197     BMP:    Recent Labs      09/15/17   2036  09/17/17   0534   NA  141  139   K  4.6  4.8   CL  104  101   CO2  21  25   BUN  24*  22   CREATININE  0.70  0.65   GLUCOSE  148*  102*     Hepatic:   Recent Labs      09/17/17   0534   AST  16   ALT  12   BILITOT  0.46   ALKPHOS  92     Troponin: No results for input(s): TROPONINI in the last 72 hours. BNP: No results for input(s): BNP in the last 72 hours. Lipids:   Recent Labs      09/16/17   0532   CHOL  214*   HDL  44     INR:   Recent Labs      09/15/17   2036   INR  0.9       Objective:   Vitals: BP (!) 161/92  Pulse 87  Temp 97.7 °F (36.5 °C) (Temporal)   Resp 18  Ht 5' 1\" (1.549 m)  Wt 165 lb 3.2 oz (74.9 kg)  SpO2 98%  BMI 31.21 kg/m2  General appearance: alert and cooperative with exam  HEENT: Head: Normocephalic, no lesions, without obvious abnormality.   Neck: no JVD  Lungs: CTAB  Heart: RRR s1+s2, no murmurs  Abdomen: soft, non-tender  Extremities: no edema  Neurologic: not done        Assessment / Acute Cardiac Problems:   Chest pain concerning for

## 2017-09-19 LAB
ABSOLUTE EOS #: 0.3 K/UL (ref 0–0.4)
ABSOLUTE LYMPH #: 2.1 K/UL (ref 1–4.8)
ABSOLUTE MONO #: 0.7 K/UL (ref 0.2–0.8)
ANION GAP SERPL CALCULATED.3IONS-SCNC: 14 MMOL/L (ref 9–17)
BASOPHILS # BLD: 1 %
BASOPHILS ABSOLUTE: 0 K/UL (ref 0–0.2)
BUN BLDV-MCNC: 17 MG/DL (ref 8–23)
BUN/CREAT BLD: 27 (ref 9–20)
CALCIUM SERPL-MCNC: 9.3 MG/DL (ref 8.6–10.4)
CHLORIDE BLD-SCNC: 102 MMOL/L (ref 98–107)
CO2: 25 MMOL/L (ref 20–31)
CREAT SERPL-MCNC: 0.63 MG/DL (ref 0.5–0.9)
DIFFERENTIAL TYPE: NORMAL
EOSINOPHILS RELATIVE PERCENT: 4 %
GFR AFRICAN AMERICAN: >60 ML/MIN
GFR NON-AFRICAN AMERICAN: >60 ML/MIN
GFR SERPL CREATININE-BSD FRML MDRD: ABNORMAL ML/MIN/{1.73_M2}
GFR SERPL CREATININE-BSD FRML MDRD: ABNORMAL ML/MIN/{1.73_M2}
GLUCOSE BLD-MCNC: 103 MG/DL (ref 70–99)
HCT VFR BLD CALC: 40.8 % (ref 36–46)
HEMOGLOBIN: 13.9 G/DL (ref 12–16)
LV EF: 38 %
LVEF MODALITY: NORMAL
LYMPHOCYTES # BLD: 29 %
MCH RBC QN AUTO: 31.1 PG (ref 26–34)
MCHC RBC AUTO-ENTMCNC: 34 G/DL (ref 31–37)
MCV RBC AUTO: 91.4 FL (ref 80–100)
MONOCYTES # BLD: 10 %
PDW BLD-RTO: 14 % (ref 11.5–14.5)
PLATELET # BLD: 206 K/UL (ref 130–400)
PLATELET ESTIMATE: NORMAL
PMV BLD AUTO: 8.1 FL (ref 6–12)
POTASSIUM SERPL-SCNC: 4.2 MMOL/L (ref 3.7–5.3)
RBC # BLD: 4.46 M/UL (ref 4–5.2)
RBC # BLD: NORMAL 10*6/UL
SEG NEUTROPHILS: 56 %
SEGMENTED NEUTROPHILS ABSOLUTE COUNT: 4.1 K/UL (ref 1.8–7.7)
SODIUM BLD-SCNC: 141 MMOL/L (ref 135–144)
WBC # BLD: 7.2 K/UL (ref 3.5–11)
WBC # BLD: NORMAL 10*3/UL

## 2017-09-19 PROCEDURE — 36415 COLL VENOUS BLD VENIPUNCTURE: CPT

## 2017-09-19 PROCEDURE — 85025 COMPLETE CBC W/AUTO DIFF WBC: CPT

## 2017-09-19 PROCEDURE — 2060000000 HC ICU INTERMEDIATE R&B

## 2017-09-19 PROCEDURE — 93306 TTE W/DOPPLER COMPLETE: CPT

## 2017-09-19 PROCEDURE — 6370000000 HC RX 637 (ALT 250 FOR IP): Performed by: INTERNAL MEDICINE

## 2017-09-19 PROCEDURE — 6360000002 HC RX W HCPCS: Performed by: INTERNAL MEDICINE

## 2017-09-19 PROCEDURE — 80048 BASIC METABOLIC PNL TOTAL CA: CPT

## 2017-09-19 PROCEDURE — 2580000003 HC RX 258: Performed by: INTERNAL MEDICINE

## 2017-09-19 RX ORDER — ALBUTEROL SULFATE 2.5 MG/3ML
2.5 SOLUTION RESPIRATORY (INHALATION) EVERY 6 HOURS PRN
Status: DISCONTINUED | OUTPATIENT
Start: 2017-09-19 | End: 2017-09-21 | Stop reason: HOSPADM

## 2017-09-19 RX ORDER — PANTOPRAZOLE SODIUM 40 MG/1
40 TABLET, DELAYED RELEASE ORAL ONCE
Status: COMPLETED | OUTPATIENT
Start: 2017-09-19 | End: 2017-09-19

## 2017-09-19 RX ADMIN — IBUPROFEN 400 MG: 200 TABLET, FILM COATED ORAL at 09:32

## 2017-09-19 RX ADMIN — ONDANSETRON 4 MG: 2 INJECTION INTRAMUSCULAR; INTRAVENOUS at 04:23

## 2017-09-19 RX ADMIN — AMLODIPINE BESYLATE 5 MG: 5 TABLET ORAL at 08:25

## 2017-09-19 RX ADMIN — METOPROLOL TARTRATE 25 MG: 25 TABLET ORAL at 08:25

## 2017-09-19 RX ADMIN — METOPROLOL TARTRATE 25 MG: 25 TABLET ORAL at 23:14

## 2017-09-19 RX ADMIN — CEFTRIAXONE SODIUM 1 G: 1 INJECTION, POWDER, FOR SOLUTION INTRAMUSCULAR; INTRAVENOUS at 23:08

## 2017-09-19 RX ADMIN — Medication 10 ML: at 08:20

## 2017-09-19 RX ADMIN — ASPIRIN 81 MG: 81 TABLET, COATED ORAL at 08:25

## 2017-09-19 RX ADMIN — PANTOPRAZOLE SODIUM 40 MG: 40 TABLET, DELAYED RELEASE ORAL at 08:28

## 2017-09-19 RX ADMIN — ATORVASTATIN CALCIUM 80 MG: 80 TABLET, FILM COATED ORAL at 20:39

## 2017-09-19 ASSESSMENT — PAIN DESCRIPTION - ONSET
ONSET: ON-GOING
ONSET: ON-GOING

## 2017-09-19 ASSESSMENT — PAIN SCALES - GENERAL
PAINLEVEL_OUTOF10: 0
PAINLEVEL_OUTOF10: 3
PAINLEVEL_OUTOF10: 0
PAINLEVEL_OUTOF10: 0
PAINLEVEL_OUTOF10: 5
PAINLEVEL_OUTOF10: 0

## 2017-09-19 ASSESSMENT — PAIN DESCRIPTION - FREQUENCY
FREQUENCY: CONTINUOUS
FREQUENCY: CONTINUOUS

## 2017-09-19 ASSESSMENT — PAIN DESCRIPTION - ORIENTATION
ORIENTATION: MID
ORIENTATION: MID

## 2017-09-19 ASSESSMENT — PAIN DESCRIPTION - DESCRIPTORS
DESCRIPTORS: HEADACHE
DESCRIPTORS: HEADACHE

## 2017-09-19 ASSESSMENT — PAIN DESCRIPTION - LOCATION
LOCATION: HEAD
LOCATION: HEAD

## 2017-09-19 ASSESSMENT — PAIN DESCRIPTION - PROGRESSION
CLINICAL_PROGRESSION: GRADUALLY IMPROVING
CLINICAL_PROGRESSION: NOT CHANGED

## 2017-09-19 ASSESSMENT — PAIN DESCRIPTION - PAIN TYPE
TYPE: ACUTE PAIN
TYPE: ACUTE PAIN

## 2017-09-19 NOTE — PROGRESS NOTES
[facility]ove[facility], [facility] Chest pain [R07.9]  Chest pain [R07.9] . Vitals:    09/19/17 1600   BP: (!) 141/76   Pulse: 66   Resp: 18   Temp: 97.7 °F (36.5 °C)   SpO2: 96%   . Patients home meds are   Prior to Admission medications    Medication Sig Start Date End Date Taking? Authorizing Provider   aspirin 81 MG tablet Take 81 mg by mouth daily   Yes Historical Provider, MD   ibuprofen (ADVIL;MOTRIN) 200 MG tablet Take 200 mg by mouth every 6 hours as needed for Pain   Yes Historical Provider, MD   NITROSTAT 0.4 MG SL tablet DISSOLVE 1 TAB UNDER TONGUE FOR CHEST PAIN - IF PAIN REMAINS AFTER 5 MIN, CALL 911 AND REPEAT DOSE.  MAX 3 TABS IN 15 MINUTES 7/23/15  Yes Montse Casarez MD   .  Recent Surgical History: None = 0     Assessment     pt refuses to blow    Predicted:   Personal Best:   PEF   % Predicted   Peak Flow : not applicable = 0    TNX0/DHO    FEV1 Predicted       FEV1     FEV1 % Predicted   FVC   IS volume   IBW    RR 16  Breath Sounds: diminished      · Bronchodilator assessment at level 1  · Hyperinflation assessment at level   · Secretion Management assessment at level    ·   · [x]    Bronchodilator Assessment  BRONCHODILATOR ASSESSMENT SCORE  Score 0 1 2 3 4 5   Breath Sounds   []  Patient Baseline [x]  No Wheeze good aeration []  Faint, scattered wheezing, good aeration []  Expiratory Wheezing and or moderately diminished []  Insp/Exp wheeze and/or very diminished []  Insp/Exp and/ or marked distress   Respiratory Rate   []  Patient Baseline [x]  Less than 20 []  Less than 20 []  20-25 []  Greater than 25 []  Greater than 25   Peak flow % of Pred or PB []  NA   []  Greater than 90%  []  81-90% []  71-80% []  Less than or equal to 70%  or unable to perform []  Unable due to Respiratory Distress   Dyspnea re []  Patient Baseline []  No SOB []  No SOB []  SOB on exertion []  SOB min activity []  At rest/acute   e FEV% Predicted       []  NA []  Above 69%  [x]  Unable []  Above 60-69%  []  Unable []  Above 50-59%  []  Unable []  Above 35-49%  []  Unable []  Less than 35%  []  Unable                 []  Hyperinflation Assessment  Score 1 2 3   CXR and Breath Sounds   []  Clear []  No atelectasis  Basilar aeration []  Atelectasis or absent basilar breath sounds   Incentive Spirometry Volume  (Per IBW)   []  Greater than or equal to 15ml/Kg []  less than 15ml/Kg []  less than 15ml/Kg   Surgery within last 2 weeks []  None or general   []  Abdominal or thoracic surgery  []  Abdominal or thoracic   Chronic Pulmonary Historyre []  No []  Yes []  Yes     []  Secretion Management Assessment  Score 1 2 3   Bilateral Breath Sounds   []  Occasional Rhonchi []  Scattered Rhonchi []  Course Rhonchi and/or poor aeration   Sputum    []  Small amount of thin secretions []  Moderate amount of viscous secretions []  Copius, Viscious Yellow/ Secretions   CXR as reported by physician []  clear  []  Unavailable []  Infiltrates and/or consolidation  []  Unavailable []  Mucus Plugging and or lobar consolidation  []  Unavailable   Cough []  Strong, productive cough []  Weak productive cough []  No cough or weak non-productive cough   Neal Childes  4:56 PM                            FEMALE                                  MALE                            FEV1 Predicted Normal Values                        FEV1 Predicted Normal Values          Age                                     Height in Feet and Inches       Age                                     Height in Feet and Inches       4' 11\" 5' 1\" 5' 3\" 5' 5\" 5' 7\" 5' 9\" 5' 11\" 6' 1\"  4' 11\" 5' 1\" 5' 3\" 5' 5\" 5' 7\" 5' 9\" 5' 11\" 6' 1\"   42 - 45 2.49 2.66 2.84 3.03 3.22 3.42 3.62 3.83 42 - 45 2.82 3.03 3.26 3.49 3.72 3.96 4.22 4.47   46 - 49 2.40 2.57 2.76 2.94 3.14 3.33 3.54 3.75 46 - 49 2.70 2.92 3.14 3.37 3.61 3.85 4.10 4.36   50 - 53 2.31 2.48 2.66 2.85 3.04 3.24 3.45 3.66 50 - 53 2.58 2.80 3.02 3.25 3.49 3.73 3.98 4.24   54 - 57 2.21 2.38 2.57 2.75 2.95 3.14 3. 35 3.56 54 - 57 2.46 2.67 2.89 3.12 3.36 3.60 3.85 4.11   58 - 61 2.10 2.28 2.46 2.65 2.84 3.04 3.24 3.45 58 - 61 2.32 2.54 2.76 2.99 3.23 3.47 3.72 3.98   62 - 65 1.99 2.17 2.35 2.54 2.73 2.93 3.13 3.34 62 - 65 2.19 2.40 2.62 2.85 3.09 3.33 3.58 3.84   66 - 69 1.88 2.05 2.23 2.42 2.61 2.81 3.02 3.23 66 - 69 2.04 2.26 2.48 2.71 2.95 3.19 3.44 3.70   70+ 1.82 1.99 2.17 2.36 2.55 2.75 2.95 3.16 70+ 1.97 2.19 2.41 2.64 2.87 3.12 3.37 3.62             Predicted Peak Expiratory Flow Rate                                       Height (in)  Female       Height (in) Male           Age 64 63 56 61 58 73 78 74 Age            21 344 357 372 387 402 417 432 446  60 62 64 66 68 70 72 74 76   25 337 352 366 381 396 411 426 441 25 447 476 505 533 562 591 [phone]   30 329 344 359 374 389 404 419 434 30 437 466 494 523 [phone] 667   35 322 337 351 366 381 396 411 426 35 426 455 484 512 541 [phone]   40 314 329 344 359 374 389 404 419 40 416 445 473 502 531 559 588 617 647   45 307 322 336 351 366 381 396 411 45 405 434 463 491 520 549 577 606 636   50 299 314 329 344 359 374 389 404 50 395 424 452 481 510 538 567 596 625   55 292 307 321 336 351 366 381 396 55 384 413 442 470 499 528 556 585 615   60 284 299 314 329 344 359 374 389 60 374 403 431 460 489 517 546 575 605   65 277 292 306 321 336 351 366 381 65 363 392 421 449 478 507 535 564 594   70 269 284 299 314 329 344 359 374 70 353 382 410 439 468 496 525 554 583   75 261 274 289 305 319 334 348 364 75 344 372 400 429 458 487 515 544 573   80 253 266 282 296 312 327 342 356 80 335 362 390 419 448 476 [phone]

## 2017-09-19 NOTE — FLOWSHEET NOTE
Dr. Rogelio Aguilera arrived to the patient's bedside. Plan for cardiac catheterization to be performed tomorrow. Order received for cardiac diet effective now, and NPO at midnight.

## 2017-09-19 NOTE — FLOWSHEET NOTE
Patient's right FA I.V. appears red and irritated. I.V flushes without complications. Patient was non-compliant with establishing new I.V. Access throughout the shift; however, she verbalizes compliance with establishing new I.v. Access at this time. Patient verbalizes, \"I will only get a new I.v. If the visual machine to find my veins. \" RN called [facility] and ER for assistance with ultrasound guided I.V. Start. Awaiting ER assistance at this time. RN explained to the patient and patient verbalizes understanding.

## 2017-09-19 NOTE — FLOWSHEET NOTE
09/18/17 2027 09/18/17 2028   Vitals   BP (!) 152/96 (!) 161/78   MAP (mmHg) 108 97   BP Location Right upper arm Left upper arm   BP Upper/Lower Upper Upper   BP Method Automatic Automatic   Patient Position Semi fowlers Semi fowlers     Patient is refusing her 25mg dose of metoprolol. She states it makes her feel funny and drops her pressure too low. RN asked how low her pressure drops and patient stated she didn't know, but she knows it drops it too low. RN asked if there is anything that can bring her blood pressure down that doesn't make her feel funny and patient became verbally aggressive and verbally abusive.

## 2017-09-19 NOTE — FLOWSHEET NOTE
Patient's bilateral groins and wrists shaved and cleansed with chlorhexidine solution in preporation for the cardiac catheterization. Patient tolerated well. Patient refuses to view the cardiac cath educational video. RN briefly discussed the cardiac cath procedure, as well as post-op expectations with the patient at the bedside. Patient verbalizes, \"I am not going to be happy with laying flat or having to get my blood pressure taken all the time. \" RN discussed the importance of monitoring vital signs, pulses, and cardiac cath site during recovery. Patient verbalizes understanding.

## 2017-09-20 ENCOUNTER — HOSPITAL ENCOUNTER (OUTPATIENT)
Dept: CARDIAC CATH/INVASIVE PROCEDURES | Age: 62
Discharge: HOME | End: 2017-09-20
Payer: MEDICARE

## 2017-09-20 PROBLEM — Z98.890 S/P CARDIAC CATH: Status: ACTIVE | Noted: 2017-09-20

## 2017-09-20 LAB
ABSOLUTE EOS #: 0.3 K/UL (ref 0–0.4)
ABSOLUTE EOS #: 0.3 K/UL (ref 0–0.4)
ABSOLUTE LYMPH #: 2.3 K/UL (ref 1–4.8)
ABSOLUTE LYMPH #: 2.8 K/UL (ref 1–4.8)
ABSOLUTE MONO #: 0.7 K/UL (ref 0.2–0.8)
ABSOLUTE MONO #: 0.8 K/UL (ref 0.2–0.8)
ANION GAP SERPL CALCULATED.3IONS-SCNC: 15 MMOL/L (ref 9–17)
BASOPHILS # BLD: 0 %
BASOPHILS # BLD: 0 %
BASOPHILS ABSOLUTE: 0 K/UL (ref 0–0.2)
BASOPHILS ABSOLUTE: 0 K/UL (ref 0–0.2)
BUN BLDV-MCNC: 21 MG/DL (ref 8–23)
BUN/CREAT BLD: 35 (ref 9–20)
CALCIUM SERPL-MCNC: 9 MG/DL (ref 8.6–10.4)
CHLORIDE BLD-SCNC: 99 MMOL/L (ref 98–107)
CO2: 24 MMOL/L (ref 20–31)
CREAT SERPL-MCNC: 0.6 MG/DL (ref 0.5–0.9)
DIFFERENTIAL TYPE: NORMAL
DIFFERENTIAL TYPE: NORMAL
EOSINOPHILS RELATIVE PERCENT: 4 %
EOSINOPHILS RELATIVE PERCENT: 4 %
GFR AFRICAN AMERICAN: >60 ML/MIN
GFR NON-AFRICAN AMERICAN: >60 ML/MIN
GFR SERPL CREATININE-BSD FRML MDRD: ABNORMAL ML/MIN/{1.73_M2}
GFR SERPL CREATININE-BSD FRML MDRD: ABNORMAL ML/MIN/{1.73_M2}
GLUCOSE BLD-MCNC: 106 MG/DL (ref 70–99)
HCT VFR BLD CALC: 39.3 % (ref 36–46)
HCT VFR BLD CALC: 40.8 % (ref 36–46)
HEMOGLOBIN: 13.5 G/DL (ref 12–16)
HEMOGLOBIN: 14 G/DL (ref 12–16)
LV EF: 35 %
LVEF MODALITY: NORMAL
LYMPHOCYTES # BLD: 25 %
LYMPHOCYTES # BLD: 29 %
MCH RBC QN AUTO: 31 PG (ref 26–34)
MCH RBC QN AUTO: 31 PG (ref 26–34)
MCHC RBC AUTO-ENTMCNC: 34.2 G/DL (ref 31–37)
MCHC RBC AUTO-ENTMCNC: 34.3 G/DL (ref 31–37)
MCV RBC AUTO: 90.1 FL (ref 80–100)
MCV RBC AUTO: 90.4 FL (ref 80–100)
MONOCYTES # BLD: 7 %
MONOCYTES # BLD: 9 %
PDW BLD-RTO: 13.9 % (ref 11.5–14.5)
PDW BLD-RTO: 13.9 % (ref 11.5–14.5)
PLATELET # BLD: 210 K/UL (ref 130–400)
PLATELET # BLD: 211 K/UL (ref 130–400)
PLATELET ESTIMATE: NORMAL
PLATELET ESTIMATE: NORMAL
PMV BLD AUTO: 8 FL (ref 6–12)
PMV BLD AUTO: 8.2 FL (ref 6–12)
POTASSIUM SERPL-SCNC: 4.1 MMOL/L (ref 3.7–5.3)
RBC # BLD: 4.36 M/UL (ref 4–5.2)
RBC # BLD: 4.51 M/UL (ref 4–5.2)
RBC # BLD: NORMAL 10*6/UL
RBC # BLD: NORMAL 10*6/UL
SEG NEUTROPHILS: 60 %
SEG NEUTROPHILS: 62 %
SEGMENTED NEUTROPHILS ABSOLUTE COUNT: 5.7 K/UL (ref 1.8–7.7)
SEGMENTED NEUTROPHILS ABSOLUTE COUNT: 5.7 K/UL (ref 1.8–7.7)
SODIUM BLD-SCNC: 138 MMOL/L (ref 135–144)
WBC # BLD: 9.2 K/UL (ref 3.5–11)
WBC # BLD: 9.6 K/UL (ref 3.5–11)
WBC # BLD: NORMAL 10*3/UL
WBC # BLD: NORMAL 10*3/UL

## 2017-09-20 PROCEDURE — 2500000003 HC RX 250 WO HCPCS

## 2017-09-20 PROCEDURE — 80048 BASIC METABOLIC PNL TOTAL CA: CPT

## 2017-09-20 PROCEDURE — B2111ZZ FLUOROSCOPY OF MULTIPLE CORONARY ARTERIES USING LOW OSMOLAR CONTRAST: ICD-10-PCS | Performed by: INTERNAL MEDICINE

## 2017-09-20 PROCEDURE — 2580000003 HC RX 258: Performed by: INTERNAL MEDICINE

## 2017-09-20 PROCEDURE — 93458 L HRT ARTERY/VENTRICLE ANGIO: CPT | Performed by: INTERNAL MEDICINE

## 2017-09-20 PROCEDURE — 6370000000 HC RX 637 (ALT 250 FOR IP): Performed by: INTERNAL MEDICINE

## 2017-09-20 PROCEDURE — C1725 CATH, TRANSLUMIN NON-LASER: HCPCS

## 2017-09-20 PROCEDURE — B2151ZZ FLUOROSCOPY OF LEFT HEART USING LOW OSMOLAR CONTRAST: ICD-10-PCS | Performed by: INTERNAL MEDICINE

## 2017-09-20 PROCEDURE — C1760 CLOSURE DEV, VASC: HCPCS

## 2017-09-20 PROCEDURE — 85025 COMPLETE CBC W/AUTO DIFF WBC: CPT

## 2017-09-20 PROCEDURE — C1894 INTRO/SHEATH, NON-LASER: HCPCS

## 2017-09-20 PROCEDURE — 99223 1ST HOSP IP/OBS HIGH 75: CPT | Performed by: THORACIC SURGERY (CARDIOTHORACIC VASCULAR SURGERY)

## 2017-09-20 PROCEDURE — 36415 COLL VENOUS BLD VENIPUNCTURE: CPT

## 2017-09-20 PROCEDURE — 6360000002 HC RX W HCPCS

## 2017-09-20 PROCEDURE — 2060000000 HC ICU INTERMEDIATE R&B

## 2017-09-20 PROCEDURE — 6360000002 HC RX W HCPCS: Performed by: INTERNAL MEDICINE

## 2017-09-20 PROCEDURE — 4A023N7 MEASUREMENT OF CARDIAC SAMPLING AND PRESSURE, LEFT HEART, PERCUTANEOUS APPROACH: ICD-10-PCS | Performed by: INTERNAL MEDICINE

## 2017-09-20 RX ORDER — BACITRACIN, NEOMYCIN, POLYMYXIN B 400; 3.5; 5 [USP'U]/G; MG/G; [USP'U]/G
OINTMENT TOPICAL 2 TIMES DAILY
Status: DISCONTINUED | OUTPATIENT
Start: 2017-09-20 | End: 2017-09-21 | Stop reason: HOSPADM

## 2017-09-20 RX ORDER — SODIUM CHLORIDE 9 MG/ML
INJECTION, SOLUTION INTRAVENOUS CONTINUOUS
Status: DISCONTINUED | OUTPATIENT
Start: 2017-09-20 | End: 2017-09-21

## 2017-09-20 RX ORDER — SODIUM CHLORIDE 0.9 % (FLUSH) 0.9 %
10 SYRINGE (ML) INJECTION EVERY 12 HOURS SCHEDULED
Status: DISCONTINUED | OUTPATIENT
Start: 2017-09-20 | End: 2017-09-21 | Stop reason: HOSPADM

## 2017-09-20 RX ORDER — ACETAMINOPHEN 325 MG/1
650 TABLET ORAL EVERY 4 HOURS PRN
Status: DISCONTINUED | OUTPATIENT
Start: 2017-09-20 | End: 2017-09-21 | Stop reason: HOSPADM

## 2017-09-20 RX ORDER — SODIUM CHLORIDE 0.9 % (FLUSH) 0.9 %
10 SYRINGE (ML) INJECTION PRN
Status: DISCONTINUED | OUTPATIENT
Start: 2017-09-20 | End: 2017-09-21 | Stop reason: HOSPADM

## 2017-09-20 RX ADMIN — Medication 10 ML: at 09:00

## 2017-09-20 RX ADMIN — Medication 10 ML: at 21:16

## 2017-09-20 RX ADMIN — ONDANSETRON 4 MG: 2 INJECTION INTRAMUSCULAR; INTRAVENOUS at 06:08

## 2017-09-20 RX ADMIN — ATORVASTATIN CALCIUM 80 MG: 80 TABLET, FILM COATED ORAL at 21:08

## 2017-09-20 RX ADMIN — METOPROLOL TARTRATE 25 MG: 25 TABLET ORAL at 21:08

## 2017-09-20 RX ADMIN — PANTOPRAZOLE SODIUM 40 MG: 40 TABLET, DELAYED RELEASE ORAL at 06:08

## 2017-09-20 RX ADMIN — CEFTRIAXONE SODIUM 1 G: 1 INJECTION, POWDER, FOR SOLUTION INTRAMUSCULAR; INTRAVENOUS at 21:08

## 2017-09-20 RX ADMIN — BACITRACIN, NEOMYCIN, POLYMYXIN B: 400; 3.5; 5 OINTMENT TOPICAL at 21:08

## 2017-09-20 ASSESSMENT — PAIN SCALES - GENERAL
PAINLEVEL_OUTOF10: 0

## 2017-09-20 NOTE — PROGRESS NOTES
(gastroesophageal reflux disease)    Thoracic scoliosis    Ganglion cyst    CAD S/P percutaneous coronary angioplasty (4/2013-Dr. Grace Vann)    CAD S/P percutaneous coronary angioplasty-open stent( 7/29/2013-Dr. nichols)    Chest pain    CAD (coronary artery disease)    HTN (hypertension)    Atypical chest pain    S/P - Mid RCA 10/17/14-Dr. nichols    S/P stents LAD, LCX and proximal RCA -     Hyperlipidemia    Chest pain     HLD (hyperlipidemia)    GERD (gastroesophageal reflux disease)    Chronic back pain    Precordial pain    Breast cancer screening    Lumbosacral pain    Low back pain with sciatica    Intervertebral disk disease    Midline low back pain without sciatica    Hip pain    Essential hypertension    Headache in front of head    Accelerated hypertension    Speech disturbance    RBBB (right bundle branch block)    COPD (chronic obstructive pulmonary disease) (HCC)     Chest pain resolved  Abnormal stress test   coronary artery disease  COPD smoker    Plan:    meds labs reviewed continue same treatment refused cardiac cath today will be done tomorrow see orders      Song Rich MD  8:18 PM

## 2017-09-20 NOTE — H&P
Atraumatic, normocephalic. Hearing grossly intact. Neck: Supple, nontender. Trachea midline. No thyromegaly. No carotid bruit noted  Lungs: clear to auscultation, no crackles or wheezing  Cardiovascular: Normal S1, S2, no murmur or gallop appreciated  Abdomen:  Soft, non-tender, normal bowel sounds. No bruits, organomegaly or masses. Musculoskeletal:  5/5 muscle strength throughout. Extremities: no edema, peripheral pulses palpable  Skin: No jaundice or eczema. Neurological: Normal sensation throughout. Moves all extremities to command  Psychiatric: Oriented to person place and time, no evidence of depression. Data:  CBC:   Lab Results   Component Value Date    WBC 9.2 09/20/2017    RBC 4.51 09/20/2017    HGB 14.0 09/20/2017    HCT 40.8 09/20/2017    MCV 90.4 09/20/2017    MCH 31.0 09/20/2017    MCHC 34.2 09/20/2017    RDW 13.9 09/20/2017     09/20/2017    MPV 8.0 09/20/2017     BMP:    Lab Results   Component Value Date     09/20/2017    K 4.1 09/20/2017    CL 99 09/20/2017    CO2 24 09/20/2017    BUN 21 09/20/2017    LABALBU 3.8 09/17/2017    CREATININE 0.60 09/20/2017    CALCIUM 9.0 09/20/2017    GFRAA >60 09/20/2017    LABGLOM >60 09/20/2017    GLUCOSE 106 09/20/2017     Hepatic Function Panel:    Lab Results   Component Value Date    ALKPHOS 92 09/17/2017    ALT 12 09/17/2017    AST 16 09/17/2017    PROT 6.4 09/17/2017    BILITOT 0.46 09/17/2017    BILIDIR <0.08 02/18/2015    IBILI CANNOT BE CALCULATED 02/18/2015    LABALBU 3.8 09/17/2017     PT/INR:    Lab Results   Component Value Date    PROTIME 9.5 09/15/2017    INR 0.9 09/15/2017     Cardiac Cath:  Left main: NL  LAD: Patent proximal stent with new mid lesion 75% stenosis  LCX: Large with patent proximal - mid stent  Om1: proximal 80% stenosis  Om2: proximal 90% stenosis  RCA: Proximal 75% stenosis  PDA mid 8-% stenosis  The LV gram was performed in the BAIG 30 position. LVEF: 30%.  LV Wall Motion: Severe global hypokinesia with

## 2017-09-20 NOTE — PROGRESS NOTES
Dr Radha Velasco in to see patient to discuss cardiac surgery. Patient became very agitated during discussion and asked physician to leave. She is frustrated to have \" too many cardiologistS and states that her care is all messed up. \"  As he is trying to explain that he is a surgeon that was consulted to help her, she asked him to leave. I, myself was in the room during the entire event. We left as patient was very upset. Dr Ra Lopez updated.

## 2017-09-20 NOTE — CARE COORDINATION
getting PT/OT at home, but did like nurses coming out. Writer asked if referral could be made to [facility] again, pt stated she need to research agencies before making decision. Dc planner informed.         Electronically signed by ELOY Bolivar on 9/20/17 at 3:17 PM

## 2017-09-20 NOTE — PROGRESS NOTES
CT Surgery  Pt seen and examined, she is a candidate for CABG and LVA resection. Pt wants to go home and come back for surgery, discussed with Dr. Ronald Ferguson, will get PFTs, carotid dopplers and if she has no CP or dyspnea on walking around in the unit, she could go home and come back for early surgery.   Catalina Harding MD

## 2017-09-20 NOTE — OP NOTE
[facility]        Date:   9/20/2017  Patient name: Darci Serrato  Date of admission:  9/15/2017  8:26 PM  MRN:   [id number]  YOB: 1955    CARDIAC CATHETERIZATION    Operators:  Primary:   Assistant:     Indications for cath: Angin worsening and abnormal stress test with worsening EF    Procedure performed: Left heart cath    Access: Right Femoral artery      Procedure: After informed consent was obtained with explanation of the risks and benefits, patient was brought to the cath lab. The right groin were prepped and draped in sterile fashion. 1% lidocaine was used for local block. The Femoral artery was cannulated with 6  Fr sheath with brisk arterial blood return. The side port was frequently flushed and aspirated with normal saline. Findings:    Left main: NL  LAD: Patent proximal stent with new mid lesion 75% stenosis  LCX: Large with patent proximal - mid stent  Om1: proximal 80% stenosis  Om2: proximal 90% stenosis  RCA: Proximal 75% stenosis  PDA mid 8-% stenosis  The LV gram was performed in the BAIG 30 position. LVEF: 30%. LV Wall Motion: Severe global hypokinesia with aneurysmal changes apical area      Conclusions:  1. Multivessel CAD  2. Severe LV dysfunction  3. Aneurysm apical area      Recommendation:  1. CV surgery consult for CABG and aneurysm resection  2.  If EF stay low after CABG will consider Primary Prevention AICD        Electronically signed by Jayesh Olivier MD on 9/20/2017 at 7:55 [facility]  [phone]

## 2017-09-20 NOTE — PROGRESS NOTES
Patient requested to speak to writer. Patient stated that she is uninterested in having a surgery with Dr. Ernst Leon at this time. Patient stated that she does not want to speak to any other surgeons in relation to the possibility of an open heart surgery at this time. Pt expressed a strong distrust in \"medical personnel\".

## 2017-09-21 VITALS
BODY MASS INDEX: 30.62 KG/M2 | HEART RATE: 91 BPM | TEMPERATURE: 99 F | DIASTOLIC BLOOD PRESSURE: 67 MMHG | HEIGHT: 61 IN | OXYGEN SATURATION: 95 % | RESPIRATION RATE: 16 BRPM | SYSTOLIC BLOOD PRESSURE: 126 MMHG | WEIGHT: 162.2 LBS

## 2017-09-21 PROBLEM — I80.8 SUPERFICIAL THROMBOPHLEBITIS OF RIGHT UPPER EXTREMITY: Status: ACTIVE | Noted: 2017-09-21

## 2017-09-21 PROBLEM — Z91.199 NON-COMPLIANCE: Status: ACTIVE | Noted: 2017-09-21

## 2017-09-21 PROBLEM — L03.113 CELLULITIS OF ARM, RIGHT: Status: ACTIVE | Noted: 2017-09-21

## 2017-09-21 PROCEDURE — 93971 EXTREMITY STUDY: CPT

## 2017-09-21 PROCEDURE — 6370000000 HC RX 637 (ALT 250 FOR IP): Performed by: INTERNAL MEDICINE

## 2017-09-21 PROCEDURE — 2580000003 HC RX 258: Performed by: INTERNAL MEDICINE

## 2017-09-21 RX ORDER — CEPHALEXIN 500 MG/1
500 CAPSULE ORAL 4 TIMES DAILY
Qty: 28 CAPSULE | Refills: 0 | Status: ON HOLD | OUTPATIENT
Start: 2017-09-21 | End: 2018-04-08 | Stop reason: HOSPADM

## 2017-09-21 RX ORDER — BACITRACIN, NEOMYCIN, POLYMYXIN B 400; 3.5; 5 [USP'U]/G; MG/G; [USP'U]/G
OINTMENT TOPICAL
Qty: 6 G | Refills: 0 | Status: SHIPPED | OUTPATIENT
Start: 2017-09-21 | End: 2017-10-01

## 2017-09-21 RX ORDER — ATORVASTATIN CALCIUM 80 MG/1
80 TABLET, FILM COATED ORAL NIGHTLY
Qty: 30 TABLET | Refills: 0 | Status: SHIPPED | OUTPATIENT
Start: 2017-09-21

## 2017-09-21 RX ORDER — AMLODIPINE BESYLATE 5 MG/1
5 TABLET ORAL DAILY
Qty: 30 TABLET | Refills: 0 | Status: SHIPPED | OUTPATIENT
Start: 2017-09-21 | End: 2017-11-16 | Stop reason: ALTCHOICE

## 2017-09-21 RX ADMIN — BACITRACIN, NEOMYCIN, POLYMYXIN B: 400; 3.5; 5 OINTMENT TOPICAL at 11:07

## 2017-09-21 RX ADMIN — AMLODIPINE BESYLATE 5 MG: 5 TABLET ORAL at 11:08

## 2017-09-21 RX ADMIN — PANTOPRAZOLE SODIUM 40 MG: 40 TABLET, DELAYED RELEASE ORAL at 11:08

## 2017-09-21 RX ADMIN — IBUPROFEN 400 MG: 200 TABLET, FILM COATED ORAL at 18:05

## 2017-09-21 RX ADMIN — ASPIRIN 81 MG: 81 TABLET, COATED ORAL at 11:08

## 2017-09-21 RX ADMIN — Medication 10 ML: at 11:08

## 2017-09-21 ASSESSMENT — PAIN SCALES - GENERAL
PAINLEVEL_OUTOF10: 0
PAINLEVEL_OUTOF10: 5
PAINLEVEL_OUTOF10: 0

## 2017-09-21 NOTE — DISCHARGE SUMMARY
she had inflammation of an IV site with superficial thrombophlebitis with cellulitis of the right arm as a complication of IV was discharged in stable improved condition    Discharge Exam:  See progress note from today    Disposition: home  Stable improved  Patient Instructions:   Current Discharge Medication List      START taking these medications    Details   atorvastatin (LIPITOR) 80 MG tablet Take 1 tablet by mouth nightly  Qty: 30 tablet, Refills: 0      metoprolol tartrate (LOPRESSOR) 25 MG tablet Take 1 tablet by mouth 2 times daily  Qty: 60 tablet, Refills: 0      amLODIPine (NORVASC) 5 MG tablet Take 1 tablet by mouth daily  Qty: 30 tablet, Refills: 0      neomycin-bacitracin-polymyxin (NEOSPORIN) 400-5-5000 ointment Apply topically 2 times daily. Qty: 6 g, Refills: 0      cephALEXin (KEFLEX) 500 MG capsule Take 1 capsule by mouth 4 times daily  Qty: 28 capsule, Refills: 0         CONTINUE these medications which have NOT CHANGED    Details   aspirin 81 MG tablet Take 81 mg by mouth daily      ibuprofen (ADVIL;MOTRIN) 200 MG tablet Take 200 mg by mouth every 6 hours as needed for Pain      NITROSTAT 0.4 MG SL tablet DISSOLVE 1 TAB UNDER TONGUE FOR CHEST PAIN - IF PAIN REMAINS AFTER 5 MIN, CALL 911 AND REPEAT DOSE. MAX 3 TABS IN 15 MINUTES  Qty: 25 tablet, Refills: 2           Activity: activity as tolerated  Diet: cardiac diet    Follow-up with pcp in 1 week. Cardiology in a week cardiothoracic surgery in a week advised to quit smoking options discussed advise for any chest pain to use the nitroglycerin with no relief to go to the emergency room or call 911  Signed:   Rashad Figueroa MD  9/21/2017  7:17 PM

## 2017-09-21 NOTE — PROGRESS NOTES
Follow-up coronary artery disease  No chest pain or palpitations no dyspnea no cough  Review of system no fever no chills no GI/ complaints no cardiopulmonary complaints no TIA no bleeding right arm redness swelling and tenderness from IV site  Physical exam vitals stable labs reviewed  Head normocephalic/atraumatic  Eyes no pallor no jaundice  Lungs air entry equal prolonged expiratory phase clear no crackles  Heart regular rate and rhythm no gallop or murmur  Abdomen soft positive bowel sounds without any tenderness without any megaly  Extremities good pulses no edema no Homans sign  Right forearm redness swelling and tenderness from IV site  Cardiac cath reviewed  Assessment and plan  Chest pain resolved  Coronary artery disease CABG is placed discussed with the cardiothoracic surgeon patient thinking about it  COPD stable  Chronic smoker smoking cessation advised options discussed  Right forearm arm thrombophlebitis and cellulitis warm compresses IV Rocephin we'll do a venous Doppler  Meds labs. reviewed  See orders

## 2017-09-21 NOTE — PROGRESS NOTES
Nutrition Assessment    Type and Reason for Visit: Initial, RD Nutrition Re-Screen (LOS > 5 Days)    Nutrition Recommendations: 1. Suggest continuing on cardiac diet. 2. Consider ordering Ensure Enlive ONS x 2/day. Malnutrition Assessment:  · Malnutrition Status: No malnutrition  · Findings of the 6 clinical characteristics of malnutrition (Minimum of 2 out of 6 clinical characteristics is required to make the diagnosis of moderate or severe Protein Calorie Malnutrition based on AND/ASPEN Guidelines):  1. Energy Intake-Not available, not able to assess    2. Weight Loss-No significant weight loss  3. Fat Loss-No significant subcutaneous fat loss, Orbital  4. Muscle Loss-No significant muscle mass loss, Temples (temporalis muscle)  5. Fluid Accumulation-No significant fluid accumulation  6.  Strength-Not measured    Nutrition Diagnosis:   · Problem: Inadequate oral intake  · Etiology: related to Cardiac dysfunction     Signs and symptoms:  as evidenced by Intake 50-75%, Presence of wounds    Nutrition Assessment:  · Subjective Assessment: Per Pt:  she is doing great, tolerating the cardiac diet. PO intake:  51-75% @ meals. Pt declining to have CABG surgery this admission. Plan is for her to have F/U appointment with Dr. Yuriy Melton (CV Surgery) next week.   · Nutrition-Focused Physical Findings: GI:  +rounded, +soft, +heartburn (antacid), +guardidng, +passing flatus, +active bowel sounds; PV:  WDL; Skin:  +redness, (R) forearm, +puncture site, femoral (R) (bandaid)  · Wound Type: Surgical Wound  · Current Nutrition Therapies:  · Oral Diet Orders: Cardiac   · Oral Diet intake: 51-75%  · Anthropometric Measures:  · Ht: 5' 1\" (154.9 cm)   · Current Body Wt: 162 lb 4.1 oz (73.6 kg)  · Admission Body Wt: 167 lb 8.8 oz (76 kg)  · Usual Body Wt: 162 lb 11.2 oz (73.8 kg) (4/19/17)  · Ideal Body Wt: 123 lb 7.3 oz (56 kg), % Ideal Body 131%  · BMI Classification: BMI 30.0 - 34.9 Obese Class I  · Comparative Standards

## 2017-09-21 NOTE — PROGRESS NOTES
[facility]   Progress Note                   Date:   9/21/2017  Patient name: Dahiana Bartlett  Date of admission:  9/15/2017  8:26 PM  MRN:   [id number]  YOB: 1955  PCP: Gustavo Blackmon MD    Reason for Admission: Chest pain [R07.9]  Chest pain [R07.9]    Subjective:     No cp, no sob, doesn't want CABG during this admission    Medications:   Scheduled Meds:   sodium chloride flush  10 mL Intravenous 2 times per day    neomycin-bacitracin-polymyxin   Topical BID    cefTRIAXone (ROCEPHIN) IV  1 g Intravenous Q24H    atorvastatin  80 mg Oral Nightly    pantoprazole  40 mg Oral QAM AC    aspirin  81 mg Oral Daily    amLODIPine  5 mg Oral Daily    metoprolol tartrate  25 mg Oral BID    enoxaparin  40 mg Subcutaneous Daily     Continuous Infusions:   sodium chloride Stopped (09/20/17 1500)    sodium chloride       CBC:   Recent Labs      09/19/17   0529  09/19/17   2336  09/20/17   0537   WBC  7.2  9.6  9.2   HGB  13.9  13.5  14.0   PLT  206  210  211     BMP:    Recent Labs      09/19/17   0529  09/20/17   0537   NA  141  138   K  4.2  4.1   CL  102  99   CO2  25  24   BUN  17  21   CREATININE  0.63  0.60   GLUCOSE  103*  106*     Hepatic:   No results for input(s): AST, ALT, ALB, BILITOT, ALKPHOS in the last 72 hours. Troponin: No results for input(s): TROPONINI in the last 72 hours. BNP: No results for input(s): BNP in the last 72 hours. Lipids:   No results for input(s): CHOL, HDL in the last 72 hours. Invalid input(s): LDLCALCU  INR:   No results for input(s): INR in the last 72 hours. Objective:   Vitals: BP (!) 128/59  Pulse 75  Temp 98.1 °F (36.7 °C) (Temporal)   Resp 16  Ht 5' 1\" (1.549 m)  Wt 162 lb 3.2 oz (73.6 kg)  SpO2 94%  BMI 30.65 kg/m2  General appearance: alert and cooperative with exam  HEENT: Head: Normocephalic, no lesions, without obvious abnormality.   Neck: no JVD  Lungs: CTAB  Heart: RRR s1+s2, no murmurs  Abdomen: soft, non-tender  Extremities: no edema  Neurologic: not done        Assessment / Acute Cardiac Problems:     Patient Active Problem List:     CAD (coronary artery disease)     Chronic bronchitis (HCC)     Smoker     GERD (gastroesophageal reflux disease)     Thoracic scoliosis     Ganglion cyst     CAD S/P percutaneous coronary angioplasty (4/2013-Dr. Jesus De Oliveira)     CAD S/P percutaneous coronary angioplasty-open stent( 7/29/2013-Dr. Jenna Clifton)     Chest pain     CAD (coronary artery disease)     HTN (hypertension)     Atypical chest pain     S/P - Mid RCA 10/17/14-Dr. nichols     S/P stents LAD, LCX and proximal RCA -      Hyperlipidemia     Chest pain      HLD (hyperlipidemia)     GERD (gastroesophageal reflux disease)     Chronic back pain     Precordial pain     Breast cancer screening     Lumbosacral pain     Low back pain with sciatica     Intervertebral disk disease     Midline low back pain without sciatica     Hip pain     Essential hypertension     Headache in front of head     Accelerated hypertension     Speech disturbance     RBBB (right bundle branch block)      Plan of Treatment:   1. Multivessel CAD-    - CV surgery evaluated patient, she doesn't want surgery as inpatient   - She has appointment on Wednesday with Dr. Skyla Perez (CV Surgery)   - continue ASA, statin, BB  2. Acute Systolic Heart Failure due to above with LV aneurysm-    - CV surgery following- post repair   3. HTN  4. DL  5. Non compliance    Dispo: Patient vehemently refusing surgery during this admission. Dr. Skyla Perez (CV surgery) is ok with patient going home so long as no chest pain with ambulation. She has no chest pains. She has appointment with Dr. Skyla Perez (CV surgery) this Wednesday. Thank you for allowing me to participate in the care of this patient, please do not hesitate to call if you have any questions. Asaf Black, [id number] [facility]  [email or web address]  [phone]         Asaf Black MD  [facility]

## 2017-09-21 NOTE — PROGRESS NOTES
Follow-up right arm cellulitis thrombophlebitis  Right arm less swelling less redness not painful venous Doppler superficial phlebitis  review of system no fever no chills no GI/ complaint cardiopulmonary complaints no TIA no bleeding no polyuria nor polydipsia no hypoglycemia  Physical exam vitals stable  Eyes no pallor no jaundice,  head normocephalic atraumatic,  lungs clear to auscultation percussion prolonged expiratory phase  Heart regular rate and rhythm no gallop  Abdomen soft positive bowel sounds without any tenderness without any megaly  Extremities good pulses without edema negative Homans sign    Neuro alert oriented  ×3 no focal deficit  Assessment and plan  Multivessel coronary artery disease left ventricular aneurysm  Noncompliance  COPD and smoker  Right arm cellulitis with superficial thrombophlebitis  Patient's refusing surgery cleared by everybody to discharge we'll discharge home medication plus Keflex for superficial phlebitis to see her PCP in 1 week cardiology in 1 week

## 2017-09-21 NOTE — PLAN OF CARE
Problem: Nutrition  Goal: Optimal nutrition therapy  Nutrition Problem: Inadequate oral intake  Intervention: Food and/or Nutrient Delivery: Continue current diet, Start ONS  Nutritional Goals: PO intake to meet >75% of estimated kcal/protein needs  Outcome: Ongoing

## 2017-09-26 ENCOUNTER — TELEPHONE (OUTPATIENT)
Dept: CARDIOTHORACIC SURGERY | Age: 62
End: 2017-09-26

## 2017-10-02 ENCOUNTER — OFFICE VISIT (OUTPATIENT)
Dept: FAMILY MEDICINE CLINIC | Age: 62
End: 2017-10-02
Payer: MEDICARE

## 2017-10-02 VITALS
HEART RATE: 75 BPM | SYSTOLIC BLOOD PRESSURE: 156 MMHG | WEIGHT: 164.4 LBS | DIASTOLIC BLOOD PRESSURE: 92 MMHG | BODY MASS INDEX: 31.04 KG/M2 | HEIGHT: 61 IN | TEMPERATURE: 97.3 F

## 2017-10-02 DIAGNOSIS — F17.200 SMOKER: ICD-10-CM

## 2017-10-02 DIAGNOSIS — Z98.890 S/P CARDIAC CATH: ICD-10-CM

## 2017-10-02 DIAGNOSIS — I25.10 CORONARY ARTERY DISEASE INVOLVING NATIVE CORONARY ARTERY OF NATIVE HEART WITHOUT ANGINA PECTORIS: Primary | ICD-10-CM

## 2017-10-02 DIAGNOSIS — I10 ESSENTIAL HYPERTENSION: ICD-10-CM

## 2017-10-02 PROCEDURE — 99213 OFFICE O/P EST LOW 20 MIN: CPT | Performed by: STUDENT IN AN ORGANIZED HEALTH CARE EDUCATION/TRAINING PROGRAM

## 2017-10-02 PROCEDURE — 99213 OFFICE O/P EST LOW 20 MIN: CPT

## 2017-10-02 RX ORDER — BLOOD PRESSURE TEST KIT-LARGE
KIT MISCELLANEOUS
Qty: 1 DEVICE | Refills: 0 | Status: SHIPPED | OUTPATIENT
Start: 2017-10-02

## 2017-10-02 ASSESSMENT — ENCOUNTER SYMPTOMS
SPUTUM PRODUCTION: 0
COUGH: 0
ORTHOPNEA: 0
SHORTNESS OF BREATH: 0
WHEEZING: 0
ABDOMINAL PAIN: 0

## 2017-10-02 NOTE — PROGRESS NOTES
Attending Physician Statement  I have discussed the care of Kavon Rodrigues, including pertinent history and exam findings,  with the resident. I have seen and examined the patient and the key elements of all parts of the encounter have been performed by me. I agree with the assessment, plan and orders as documented by the resident.   (GC Modifier)

## 2017-10-02 NOTE — PROGRESS NOTES
Transition Care Office Visit    Date of Face-to-Face: 10/2/2017    Persons at visit: by herself    Here for follow after hospitalization for: Coronary Artery Disease    Activity: activity as tolerated    Any medication changes since post-hospitalization phone call? Yes , patient states she has not been taking her etodoloac for for OA , has stopped amlodipine 5 mg since past 2-3 days, and has reduced the metoprolol to 12.5 mg bid from 25 mg bid. States she made the above changes as she has dizziness and light headedness. Made these changes to her medications on her own,    Any treatment changes since post-hospitalization phone call? No    Any further education needed on medications/treatment plan? Yes . Advised to continue to monitor her BP. To continue to take metoprolol 12.5 mg bid, if Bp runs high can taken amlodipine 5 mg daily      All Active Meds in Chart - may or may not be currently taking post-hospital  Current Outpatient Prescriptions   Medication Sig Dispense Refill    Blood Pressure Monitoring (SURELIFE BP MONITOR/ARM) SANDY Dx: Essential Hypertension. Monitor BP daily and log 1 Device 0    atorvastatin (LIPITOR) 80 MG tablet Take 1 tablet by mouth nightly 30 tablet 0    metoprolol tartrate (LOPRESSOR) 25 MG tablet Take 1 tablet by mouth 2 times daily 60 tablet 0    amLODIPine (NORVASC) 5 MG tablet Take 1 tablet by mouth daily 30 tablet 0    cephALEXin (KEFLEX) 500 MG capsule Take 1 capsule by mouth 4 times daily 28 capsule 0    aspirin 81 MG tablet Take 81 mg by mouth daily      ibuprofen (ADVIL;MOTRIN) 200 MG tablet Take 200 mg by mouth every 6 hours as needed for Pain      NITROSTAT 0.4 MG SL tablet DISSOLVE 1 TAB UNDER TONGUE FOR CHEST PAIN - IF PAIN REMAINS AFTER 5 MIN, CALL 911 AND REPEAT DOSE. MAX 3 TABS IN 15 MINUTES 25 tablet 2     No current facility-administered medications for this visit.         Current Medications (meds in record marked as taking per [facility] phone call)  Outpatient Prescriptions Marked as Taking for the 10/2/17 encounter (Office Visit) with Rachael Sales MD   Medication Sig Dispense Refill    Blood Pressure Monitoring (SURELIFE BP MONITOR/ARM) SANDY Dx: Essential Hypertension. Monitor BP daily and log 1 Device 0    atorvastatin (LIPITOR) 80 MG tablet Take 1 tablet by mouth nightly 30 tablet 0    metoprolol tartrate (LOPRESSOR) 25 MG tablet Take 1 tablet by mouth 2 times daily 60 tablet 0    amLODIPine (NORVASC) 5 MG tablet Take 1 tablet by mouth daily 30 tablet 0    cephALEXin (KEFLEX) 500 MG capsule Take 1 capsule by mouth 4 times daily 28 capsule 0    aspirin 81 MG tablet Take 81 mg by mouth daily      ibuprofen (ADVIL;MOTRIN) 200 MG tablet Take 200 mg by mouth every 6 hours as needed for Pain      NITROSTAT 0.4 MG SL tablet DISSOLVE 1 TAB UNDER TONGUE FOR CHEST PAIN - IF PAIN REMAINS AFTER 5 MIN, CALL 911 AND REPEAT DOSE. MAX 3 TABS IN 15 MINUTES 25 tablet 2       Medication Reconciliation  Provider has reviewed medication record and it has been modified as necessary to accurately depict current medications since hospitalization. Gemma Garcia has been instructed on these changes. See transitional care telephone note.      Social History     Social History    Marital status:      Spouse name: N/A    Number of children: N/A    Years of education: N/A     Occupational History     N/A     Social History Main Topics    Smoking status: Current Every Day Smoker     Packs/day: 0.25     Years: 37.00     Types: Cigarettes    Smokeless tobacco: Never Used    Alcohol use 0.0 oz/week     0 Standard drinks or equivalent per week      Comment: every now and then   [employer] Drug use: Yes     Special: Marijuana      Comment: daily    Sexual activity: No     Other Topics Concern    None     Social History Narrative    ** Merged History Encounter **            Past Medical History:   Diagnosis Date    Arthritis     CAD (coronary artery disease) 2006    heart attack x2    Carotid artery stenosis     Chronic back pain     [facility] [address]     COPD (chronic obstructive pulmonary disease) ([facility] [address]     Disease of blood and blood forming organ     Drug abuse 9/2/2014    GERD (gastroesophageal reflux disease) 11/20/2012    H/O blood clots     Head injury     Heart attack ([facility] [address]     Hyperlipidemia     Hypertension     Prediabetes     Stomach ulcer        Family History   Problem Relation Age of Onset    Heart Disease Mother     Diabetes Mother     High Blood Pressure Mother     High Cholesterol Mother     Other Mother      graves disease    Parkinsonism Mother     Heart Disease Father     High Cholesterol Father     High Blood Pressure Father        [facility]    HPI   Since discharge from the hospital      Chest pain-  Had one episode last night, felt pressure, no associated diaphoresis, nausea or vomiting or radiation of chesty pain. States lasted for 5 minutes, did not have nitro on her. States took 4 baby asprins and that relieved her pain. Denies palpiations, nausea or vomiting. Refuses surgery for multivessel CAD. States \" I dont think I need a surgery\". States does not want to follow up with cardiothoracic surgeon who saw her in the hospital and would like a second opinion. HTN-  Has stopped amlodipine 5 mg since past 2-3 days, and has reduced the metoprolol to 12.5 mg bid from 25 mg bid. States she made the above changes as she has dizziness and light headedness. Made these changes to her medications on her own,States she always had issues with her pressures tending to run low. Patient states she continues to smoke, and has tried to cut down on her smoking      ROS   Review of Systems   Constitutional: Negative for fever. Respiratory: Negative for cough, sputum production, shortness of breath and wheezing. Cardiovascular: Positive for chest pain. Negative for palpitations, orthopnea, claudication and leg swelling.

## 2017-10-02 NOTE — PROGRESS NOTES
Visit Information    Have you changed or started any medications since your last visit including any over-the-counter medicines, vitamins, or herbal medicines? no   Are you having any side effects from any of your medications? -  yes - Pt will verbally explain   Have you stopped taking any of your medications? Is so, why? -  no    Have you seen any other physician or provider since your last visit? No  Have you had any other diagnostic tests since your last visit? No  Have you been seen in the emergency room and/or had an admission to a hospital since we last saw you? Yes - Records Obtained  Have you had your routine dental cleaning in the past 6 months? no    Have you activated your "[employer] account? If not, what are your barriers?  Yes     Patient Care Team:  Mike Carpio MD as PCP - General (Family Medicine)  Yobani Chávez MD as Resident (Family Medicine)  Chrissy Galarza CNP as Nurse Practitioner (Nurse Practitioner)    Medical History Review  Past Medical, Family, and Social History reviewed and does not contribute to the patient presenting condition    Health Maintenance   Topic Date Due    Hepatitis C screen  1955    HIV screen  07/06/1969    Zostavax vaccine  07/06/2014    Breast cancer screen  03/25/2016    Flu vaccine (1) 09/01/2017    Colon Cancer Screen FIT/FOBT  12/19/2017 (Originally 4/5/2015)    Diabetes screen  08/25/2019    Lipid screen  09/16/2022    DTaP/Tdap/Td vaccine (2 - Td) 12/07/2026    Pneumococcal med risk  Completed

## 2017-10-02 NOTE — PATIENT INSTRUCTIONS
Thank you for letting us take care of you today. We hope all your questions were addressed. If a question was overlooked or something else comes to mind after you return home, please contact a member of your Care Team listed below. Please make sure you have a routine office visit set up to follow-up on [address]. Your Care Team at [place] [address] is Team #1  Courtney Cage MD (Faculty)  Meghan Coello MD (Faculty  Art MD Estrellita (Resident)  Bereket Canada MD (Resident)  Annalisa Palmer MD (Resident)  Riri Vernon MD (Resident)  Michell Rodriguez MD (Resident)  Cecy Frank AMARILIS Casey AMARILIS SWIFT, [facility], ([address] [facility])  ALYSIA Merrill, ([address] [facility])  [facility], Ph.D., ([address] [facility])  Rory Condon [facility] (Clinical Pharmacist)     Office phone number: [phone]    If you need to get in right away due to illness, please be advised we have \"Same Day\" appointments available Monday-Friday. Please call us at [phone] option #1 to schedule your \"Same Day\" appointment.

## 2017-10-02 NOTE — MR AVS SNAPSHOT
Mammograms are recommended every 2 years for low/average risk patients aged 48 - 69, and every year for high risk patients per updated national guidelines. However these guidelines can be individualized by your provider. 3/25/2016    Yearly Flu Vaccine (1) 9/1/2017    Colon Cancer Stool Test 12/19/2017 (Originally 4/5/2015)    Diabetes Screening 8/25/2019    Cholesterol Screening 9/16/2022    Tetanus Combination Vaccine (2 - Td) 12/7/2026            [employer] Signup           Our records indicate that you have an active [employer] account. You can view your After Visit Summary by going to [email or web address] and logging in with your [employer] username and password. If you don't have a [employer] username and password but a parent or guardian has access to your record, the parent or guardian should login with their own [employer] username and password and access your record to view the After Visit Summary. Additional Information  If you have questions, please contact the physician practice where you receive care. Remember, [employer] is NOT to be used for urgent needs. For medical emergencies, dial 911. For questions regarding your [employer] account call [phone]. If you have a clinical question, please call your doctor's office.

## 2017-10-09 ENCOUNTER — TELEPHONE (OUTPATIENT)
Dept: ADMINISTRATIVE | Age: 62
End: 2017-10-09

## 2017-11-16 ENCOUNTER — OFFICE VISIT (OUTPATIENT)
Dept: FAMILY MEDICINE CLINIC | Age: 62
DRG: 303 | End: 2017-11-16
Payer: MEDICARE

## 2017-11-16 VITALS
BODY MASS INDEX: 31.68 KG/M2 | TEMPERATURE: 97.5 F | SYSTOLIC BLOOD PRESSURE: 144 MMHG | HEIGHT: 61 IN | DIASTOLIC BLOOD PRESSURE: 38 MMHG | WEIGHT: 167.8 LBS

## 2017-11-16 DIAGNOSIS — Z23 NEED FOR VACCINATION: ICD-10-CM

## 2017-11-16 DIAGNOSIS — I10 ESSENTIAL HYPERTENSION: ICD-10-CM

## 2017-11-16 DIAGNOSIS — I25.10 CORONARY ARTERY DISEASE INVOLVING NATIVE CORONARY ARTERY OF NATIVE HEART WITHOUT ANGINA PECTORIS: Primary | ICD-10-CM

## 2017-11-16 DIAGNOSIS — I50.22 CHRONIC SYSTOLIC CONGESTIVE HEART FAILURE (HCC): ICD-10-CM

## 2017-11-16 DIAGNOSIS — Z12.39 BREAST CANCER SCREENING: ICD-10-CM

## 2017-11-16 PROCEDURE — G8484 FLU IMMUNIZE NO ADMIN: HCPCS | Performed by: INTERNAL MEDICINE

## 2017-11-16 PROCEDURE — 3017F COLORECTAL CA SCREEN DOC REV: CPT | Performed by: INTERNAL MEDICINE

## 2017-11-16 PROCEDURE — G8427 DOCREV CUR MEDS BY ELIG CLIN: HCPCS | Performed by: INTERNAL MEDICINE

## 2017-11-16 PROCEDURE — G8598 ASA/ANTIPLAT THER USED: HCPCS | Performed by: INTERNAL MEDICINE

## 2017-11-16 PROCEDURE — 99213 OFFICE O/P EST LOW 20 MIN: CPT | Performed by: INTERNAL MEDICINE

## 2017-11-16 PROCEDURE — 99213 OFFICE O/P EST LOW 20 MIN: CPT

## 2017-11-16 PROCEDURE — G8417 CALC BMI ABV UP PARAM F/U: HCPCS | Performed by: INTERNAL MEDICINE

## 2017-11-16 PROCEDURE — 3014F SCREEN MAMMO DOC REV: CPT | Performed by: INTERNAL MEDICINE

## 2017-11-16 PROCEDURE — 4004F PT TOBACCO SCREEN RCVD TLK: CPT | Performed by: INTERNAL MEDICINE

## 2017-11-16 RX ORDER — LISINOPRIL 2.5 MG/1
2.5 TABLET ORAL DAILY
Qty: 30 TABLET | Refills: 3 | Status: SHIPPED | OUTPATIENT
Start: 2017-11-16 | End: 2018-08-02 | Stop reason: SDUPTHER

## 2017-11-16 ASSESSMENT — PATIENT HEALTH QUESTIONNAIRE - PHQ9
1. LITTLE INTEREST OR PLEASURE IN DOING THINGS: 0
SUM OF ALL RESPONSES TO PHQ QUESTIONS 1-9: 0
2. FEELING DOWN, DEPRESSED OR HOPELESS: 0
SUM OF ALL RESPONSES TO PHQ9 QUESTIONS 1 & 2: 0

## 2017-11-16 ASSESSMENT — ENCOUNTER SYMPTOMS
SHORTNESS OF BREATH: 0
COUGH: 0
ABDOMINAL PAIN: 0

## 2017-11-16 NOTE — PROGRESS NOTES
normal heart sounds. Pulmonary/Chest: Effort normal and breath sounds normal.   Skin: She is not diaphoretic. Nursing note and vitals reviewed. Lab Results   Component Value Date    WBC 9.2 09/20/2017    HGB 14.0 09/20/2017    HCT 40.8 09/20/2017     09/20/2017    CHOL 214 (H) 09/16/2017    TRIG 151 (H) 09/16/2017    HDL 44 09/16/2017    ALT 12 09/17/2017    AST 16 09/17/2017     09/20/2017    K 4.1 09/20/2017    CL 99 09/20/2017    CREATININE 0.60 09/20/2017    BUN 21 09/20/2017    CO2 24 09/20/2017    TSH 1.23 04/04/2014    INR 0.9 09/15/2017    LABA1C 5.8 08/25/2016    LABMICR 16 01/10/2013     Lab Results   Component Value Date    CALCIUM 9.0 09/20/2017     Lab Results   Component Value Date    LDLCHOLESTEROL 140 (H) 09/16/2017       Assessment and Plan:    1. Chronic systolic congestive heart failure from coronary artery disease  - Previous echo reviewed, EF 35-40%. Previous cath report reviewed  - Follow with cardiology on 12/06/2017  - Controlled BP, will stop norvasc 5 and start on lisinopril 2.5 due to CHFrEF  - Follow up in 4 weeks  - Educated about risk factor control including smoking cessation    3. Essential hypertension  - BP controlled  - Will start lisinopril 2.5 and stop norvasc 5, continue lopressor 25 BID    4. Need for vaccination  - Patient wants to discuss in next visit    5. Breast cancer screening  - [facility] DIGITAL SCREEN W OR WO CAD BILATERAL; Future          Requested Prescriptions     Signed Prescriptions Disp Refills    lisinopril (PRINIVIL;ZESTRIL) 2.5 MG tablet 30 tablet 3     Sig: Take 1 tablet by mouth daily       Medications Discontinued During This Encounter   Medication Reason    amLODIPine (NORVASC) 5 MG tablet Alternate therapy       Xochitl received counseling on the following healthy behaviors: nutrition, exercise and medication adherence    Discussed use, benefit, and side effects of prescribed medications. Barriers to medication compliance addressed. All patient questions answered. Pt voiced understanding. Return in about 4 weeks (around 12/14/2017) for HTN.

## 2017-11-16 NOTE — PROGRESS NOTES
Attending Physician Statement  I have discussed the care of Dahiana Bartlett, including pertinent history and exam findings,  with the resident. I have reviewed the key elements of all parts of the encounter with the resident. I agree with the assessment, plan and orders as documented by the resident.   (GE Modifier)

## 2017-11-18 ENCOUNTER — HOSPITAL ENCOUNTER (INPATIENT)
Age: 62
LOS: 2 days | Discharge: HOME | DRG: 303 | End: 2017-11-20
Attending: EMERGENCY MEDICINE | Admitting: INTERNAL MEDICINE
Payer: MEDICARE

## 2017-11-18 ENCOUNTER — APPOINTMENT (OUTPATIENT)
Dept: CT IMAGING | Age: 62
DRG: 303 | End: 2017-11-18
Payer: MEDICARE

## 2017-11-18 ENCOUNTER — APPOINTMENT (OUTPATIENT)
Dept: GENERAL RADIOLOGY | Age: 62
DRG: 303 | End: 2017-11-18
Payer: MEDICARE

## 2017-11-18 DIAGNOSIS — R07.9 CHEST PAIN, UNSPECIFIED TYPE: Primary | ICD-10-CM

## 2017-11-18 LAB
ABSOLUTE EOS #: 0.29 K/UL (ref 0–0.44)
ABSOLUTE IMMATURE GRANULOCYTE: <0.03 K/UL (ref 0–0.3)
ABSOLUTE LYMPH #: 3.18 K/UL (ref 1.1–3.7)
ABSOLUTE MONO #: 0.81 K/UL (ref 0.1–1.2)
ANION GAP SERPL CALCULATED.3IONS-SCNC: 15 MMOL/L (ref 9–17)
BASOPHILS # BLD: 0 % (ref 0–2)
BASOPHILS ABSOLUTE: 0.04 K/UL (ref 0–0.2)
BNP INTERPRETATION: ABNORMAL
BUN BLDV-MCNC: 22 MG/DL (ref 8–23)
BUN/CREAT BLD: ABNORMAL (ref 9–20)
CALCIUM SERPL-MCNC: 8.9 MG/DL (ref 8.6–10.4)
CHLORIDE BLD-SCNC: 100 MMOL/L (ref 98–107)
CO2: 20 MMOL/L (ref 20–31)
CREAT SERPL-MCNC: 0.72 MG/DL (ref 0.5–0.9)
D-DIMER QUANTITATIVE: 0.65 MG/L FEU
DIFFERENTIAL TYPE: NORMAL
EKG ATRIAL RATE: 99 BPM
EKG P AXIS: 59 DEGREES
EKG P-R INTERVAL: 144 MS
EKG Q-T INTERVAL: 392 MS
EKG QRS DURATION: 120 MS
EKG QTC CALCULATION (BAZETT): 503 MS
EKG R AXIS: -16 DEGREES
EKG T AXIS: 62 DEGREES
EKG VENTRICULAR RATE: 99 BPM
EOSINOPHILS RELATIVE PERCENT: 3 % (ref 1–4)
GFR AFRICAN AMERICAN: >60 ML/MIN
GFR NON-AFRICAN AMERICAN: >60 ML/MIN
GFR SERPL CREATININE-BSD FRML MDRD: ABNORMAL ML/MIN/{1.73_M2}
GFR SERPL CREATININE-BSD FRML MDRD: ABNORMAL ML/MIN/{1.73_M2}
GLUCOSE BLD-MCNC: 121 MG/DL (ref 70–99)
HCT VFR BLD CALC: 42.2 % (ref 36.3–47.1)
HEMOGLOBIN: 13.7 G/DL (ref 11.9–15.1)
IMMATURE GRANULOCYTES: 0 %
LYMPHOCYTES # BLD: 36 % (ref 24–43)
MCH RBC QN AUTO: 29.7 PG (ref 25.2–33.5)
MCHC RBC AUTO-ENTMCNC: 32.5 G/DL (ref 28.4–34.8)
MCV RBC AUTO: 91.5 FL (ref 82.6–102.9)
MONOCYTES # BLD: 9 % (ref 3–12)
PDW BLD-RTO: 13.5 % (ref 11.8–14.4)
PLATELET # BLD: NORMAL K/UL (ref 138–453)
PLATELET ESTIMATE: NORMAL
PLATELET, FLUORESCENCE: NORMAL K/UL (ref 138–453)
PLATELET, IMMATURE FRACTION: NORMAL % (ref 1.1–10.3)
PMV BLD AUTO: NORMAL FL (ref 8.1–13.5)
POC TROPONIN I: 0.01 NG/ML (ref 0–0.1)
POC TROPONIN I: 0.01 NG/ML (ref 0–0.1)
POC TROPONIN INTERP: NORMAL
POC TROPONIN INTERP: NORMAL
POTASSIUM SERPL-SCNC: 4.1 MMOL/L (ref 3.7–5.3)
PRO-BNP: 1696 PG/ML
RBC # BLD: 4.61 M/UL (ref 3.95–5.11)
RBC # BLD: NORMAL 10*6/UL
SEG NEUTROPHILS: 51 % (ref 36–65)
SEGMENTED NEUTROPHILS ABSOLUTE COUNT: 4.58 K/UL (ref 1.5–8.1)
SODIUM BLD-SCNC: 135 MMOL/L (ref 135–144)
WBC # BLD: 8.9 K/UL (ref 3.5–11.3)
WBC # BLD: NORMAL 10*3/UL

## 2017-11-18 PROCEDURE — 6360000002 HC RX W HCPCS: Performed by: EMERGENCY MEDICINE

## 2017-11-18 PROCEDURE — 80048 BASIC METABOLIC PNL TOTAL CA: CPT

## 2017-11-18 PROCEDURE — 96374 THER/PROPH/DIAG INJ IV PUSH: CPT

## 2017-11-18 PROCEDURE — 85379 FIBRIN DEGRADATION QUANT: CPT

## 2017-11-18 PROCEDURE — 6360000004 HC RX CONTRAST MEDICATION: Performed by: EMERGENCY MEDICINE

## 2017-11-18 PROCEDURE — 71010 XR CHEST PORTABLE: CPT

## 2017-11-18 PROCEDURE — 99285 EMERGENCY DEPT VISIT HI MDM: CPT

## 2017-11-18 PROCEDURE — 85025 COMPLETE CBC W/AUTO DIFF WBC: CPT

## 2017-11-18 PROCEDURE — 84484 ASSAY OF TROPONIN QUANT: CPT

## 2017-11-18 PROCEDURE — 71260 CT THORAX DX C+: CPT

## 2017-11-18 PROCEDURE — 93005 ELECTROCARDIOGRAM TRACING: CPT

## 2017-11-18 PROCEDURE — 1200000000 HC SEMI PRIVATE

## 2017-11-18 PROCEDURE — 83880 ASSAY OF NATRIURETIC PEPTIDE: CPT

## 2017-11-18 RX ORDER — ONDANSETRON 2 MG/ML
4 INJECTION INTRAMUSCULAR; INTRAVENOUS ONCE
Status: COMPLETED | OUTPATIENT
Start: 2017-11-18 | End: 2017-11-18

## 2017-11-18 RX ADMIN — ONDANSETRON 4 MG: 2 INJECTION, SOLUTION INTRAMUSCULAR; INTRAVENOUS at 19:19

## 2017-11-18 RX ADMIN — IOVERSOL 75 ML: 741 INJECTION INTRA-ARTERIAL; INTRAVENOUS at 22:05

## 2017-11-18 ASSESSMENT — ENCOUNTER SYMPTOMS
CHEST TIGHTNESS: 0
VOMITING: 0
DIARRHEA: 0
WHEEZING: 0
BLOOD IN STOOL: 0
COLOR CHANGE: 0
STRIDOR: 0
SHORTNESS OF BREATH: 1
FACIAL SWELLING: 0
CHOKING: 0
NAUSEA: 1
ABDOMINAL PAIN: 0
PHOTOPHOBIA: 0
TROUBLE SWALLOWING: 0

## 2017-11-18 ASSESSMENT — PAIN DESCRIPTION - LOCATION: LOCATION: CHEST

## 2017-11-18 ASSESSMENT — PAIN DESCRIPTION - PAIN TYPE: TYPE: ACUTE PAIN

## 2017-11-18 ASSESSMENT — PAIN DESCRIPTION - DESCRIPTORS: DESCRIPTORS: PRESSURE

## 2017-11-18 ASSESSMENT — PAIN DESCRIPTION - PROGRESSION: CLINICAL_PROGRESSION: NOT CHANGED

## 2017-11-18 ASSESSMENT — PAIN SCALES - GENERAL: PAINLEVEL_OUTOF10: 2

## 2017-11-18 ASSESSMENT — PAIN DESCRIPTION - ORIENTATION: ORIENTATION: MID

## 2017-11-18 NOTE — ED PROVIDER NOTES
Hysterectomy (2005); Tonsillectomy (1960); Tubal ligation (1979); skin biopsy; Endoscopy, colon, diagnostic; and cyst removal (Right, 9/26/2013). Social History     Social History    Marital status:      Spouse name: N/A    Number of children: N/A    Years of education: N/A     Occupational History     N/A     Social History Main Topics    Smoking status: Current Every Day Smoker     Packs/day: 0.25     Years: 37.00     Types: Cigarettes    Smokeless tobacco: Never Used    Alcohol use 0.0 oz/week      Comment: every now and then   [facility] Drug use:      Types: Marijuana      Comment: daily    Sexual activity: No     Other Topics Concern    Not on file     Social History Narrative    ** Merged History Encounter **            Family History   Problem Relation Age of Onset    Heart Disease Mother     Diabetes Mother     High Blood Pressure Mother     High Cholesterol Mother     Other Mother      graves disease    Parkinsonism Mother     Heart Disease Father     High Cholesterol Father     High Blood Pressure Father        Allergies:  Latex; Codeine; and Tape [adhesive tape]    Home Medications:  Prior to Admission medications    Medication Sig Start Date End Date Taking? Authorizing Provider   lisinopril (PRINIVIL;ZESTRIL) 2.5 MG tablet Take 1 tablet by mouth daily 11/16/17   Jennifer Osborne MD   Blood Pressure Monitoring (SURELIFE BP MONITOR/ARM) SANDY Dx: Essential Hypertension.   Monitor BP daily and log 10/2/17   Shelley Soni MD   atorvastatin (LIPITOR) 80 MG tablet Take 1 tablet by mouth nightly 9/21/17   Mercedes Lang MD   metoprolol tartrate (LOPRESSOR) 25 MG tablet Take 1 tablet by mouth 2 times daily 9/21/17   Mercedes Lang MD   cephALEXin (KEFLEX) 500 MG capsule Take 1 capsule by mouth 4 times daily 9/21/17   Mercedes Lang MD   aspirin 81 MG tablet Take 81 mg by mouth daily    Historical Provider, MD   ibuprofen (ADVIL;MOTRIN) 200 MG tablet Take 200 mg by mouth every 6 74 % injection 75 mL    atorvastatin (LIPITOR) tablet 80 mg    lisinopril (PRINIVIL;ZESTRIL) tablet 2.5 mg    metoprolol tartrate (LOPRESSOR) tablet 25 mg    sodium chloride flush 0.9 % injection 10 mL    sodium chloride flush 0.9 % injection 10 mL    OR Linked Order Group     potassium chloride (KLOR-CON M) extended release tablet 40 mEq     potassium chloride 20 MEQ/15ML (10%) oral solution 40 mEq     potassium chloride 10 mEq/100 mL IVPB (Peripheral Line)    magnesium sulfate 1 g in dextrose 5% 100 mL IVPB    acetaminophen (TYLENOL) tablet 650 mg    OR Linked Order Group     HYDROcodone-acetaminophen (NORCO) 5-325 MG per tablet 1 tablet     HYDROcodone-acetaminophen (NORCO) 5-325 MG per tablet 2 tablet    OR Linked Order Group     morphine (PF) injection 2 mg     morphine (PF) injection 4 mg    magnesium hydroxide (MILK OF MAGNESIA) 400 MG/5ML suspension 30 mL    ondansetron (ZOFRAN) injection 4 mg    famotidine (PEPCID) tablet 20 mg     May cancel order if already on H2 blocker/PPI alternative as a home medication to avoid duplication of therapy.     aspirin chewable tablet 324 mg    aspirin EC tablet 325 mg    enoxaparin (LOVENOX) injection 40 mg    nitroGLYCERIN (NITROSTAT) SL tablet 0.4 mg    0.9 % sodium chloride infusion       DDX: Emergent: ACS/NSTEMI/STEMI/angina, arrhythmia, trauma, aortic dissection,  PE, PNA, pneumothroax, esophageal rupture, tamponade, Cocaine use  Nonemergent: pneumonia, pericarditis, GERD, MSK, Endocarditis, anxiety     Evaluate for: diaphoresis, present chest pain, tachypnea, BP both arms, heart sounds, JVD, tender chest wall, wheezing      DIAGNOSTIC RESULTS / EMERGENCY DEPARTMENT COURSE / MDM     LABS:  Results for orders placed or performed during the hospital encounter of 11/18/17   CBC Auto Differential   Result Value Ref Range    WBC 8.9 3.5 - 11.3 k/uL    RBC 4.61 3.95 - 5.11 m/uL    Hemoglobin 13.7 11.9 - 15.1 g/dL    Hematocrit 42.2 36.3 - 47.1 % MCV 91.5 82.6 - 102.9 fL    MCH 29.7 25.2 - 33.5 pg    MCHC 32.5 28.4 - 34.8 g/dL    RDW 13.5 11.8 - 14.4 %    Platelets See Reflexed IPF Result 138 - 453 k/uL    MPV NOT REPORTED 8.1 - 13.5 fL    Differential Type NOT REPORTED     WBC Morphology NOT REPORTED     RBC Morphology NOT REPORTED     Platelet Estimate NOT REPORTED     Seg Neutrophils 51 36 - 65 %    Lymphocytes 36 24 - 43 %    Monocytes 9 3 - 12 %    Eosinophils % 3 1 - 4 %    Basophils 0 0 - 2 %    Immature Granulocytes 0 0 %    Segs Absolute 4.58 1.50 - 8.10 k/uL    Absolute Lymph # 3.18 1.10 - 3.70 k/uL    Absolute Mono # 0.81 0.10 - 1.20 k/uL    Absolute Eos # 0.29 0.00 - 0.44 k/uL    Basophils # 0.04 0.00 - 0.20 k/uL    Absolute Immature Granulocyte <0.03 0.00 - 0.30 k/uL   Basic Metabolic Panel   Result Value Ref Range    Glucose 121 (H) 70 - 99 mg/dL    BUN 22 8 - 23 mg/dL    CREATININE 0.72 0.50 - 0.90 mg/dL    Bun/Cre Ratio NOT REPORTED 9 - 20    Calcium 8.9 8.6 - 10.4 mg/dL    Sodium 135 135 - 144 mmol/L    Potassium 4.1 3.7 - 5.3 mmol/L    Chloride 100 98 - 107 mmol/L    CO2 20 20 - 31 mmol/L    Anion Gap 15 9 - 17 mmol/L    GFR Non-African American >60 >60 mL/min    GFR African American >60 >60 mL/min    GFR Comment          GFR Staging NOT REPORTED    D-Dimer, Quantitative   Result Value Ref Range    D-Dimer, Quant 0.65 mg/L FEU   Immature Platelet Fraction   Result Value Ref Range    Platelet, Immature Fraction NOT REPORTED 1.1 - 10.3 %    Platelet, Fluorescence Platelet clumps present, count appears adequate. 138 - 453 k/uL   Brain Natriuretic Peptide   Result Value Ref Range    Pro-BNP 1,696 (H) <300 pg/mL    BNP Interpretation         POCT troponin   Result Value Ref Range    POC Troponin I 0.01 0.00 - 0.10 ng/mL    POC Troponin Interp       The Troponin-I (POC) results cannot be compared to the Troponin-T results.    POCT troponin   Result Value Ref Range    POC Troponin I 0.01 0.00 - 0.10 ng/mL    POC Troponin Interp       The Troponin-I (POC) results cannot be compared to the Troponin-T results. RADIOLOGY:  Xr Chest Portable    Result Date: 11/18/2017  EXAMINATION: SINGLE VIEW OF THE CHEST 11/18/2017 7:22 pm COMPARISON: 09/15/2017. HISTORY: ORDERING SYSTEM PROVIDED HISTORY: chest pain TECHNOLOGIST PROVIDED HISTORY: Reason for exam:->chest pain FINDINGS: The cardiac silhouette is stable. Aortic vascular calcification with mild ectasia. The lungs are clear. No infiltrate, pleural fluid or overt failure. No acute osseous findings. No acute cardiopulmonary disease. Ct Chest Pulmonary Embolism W Contrast    Result Date: 11/18/2017  EXAMINATION: CTA OF THE CHEST 11/18/2017 10:08 pm TECHNIQUE: CTA of the chest was performed after the administration of intravenous contrast.  Multiplanar reformatted images are provided for review. MIP images are provided for review. Dose modulation, iterative reconstruction, and/or weight based adjustment of the mA/kV was utilized to reduce the radiation dose to as low as reasonably achievable. COMPARISON: 09/01/2014 HISTORY: ORDERING SYSTEM PROVIDED HISTORY: sob chest pain, elevated ddimer FINDINGS: Pulmonary Arteries: Pulmonary arteries are adequately opacified for evaluation. No evidence of intraluminal filling defect to suggest pulmonary embolism. Main pulmonary artery is normal in caliber. Mediastinum: No evidence of mediastinal lymphadenopathy. The heart and pericardium demonstrate no acute abnormality. The heart is enlarged. There is coronary artery disease There is no acute abnormality of the thoracic aorta. Ascending aorta is measured at 4.1 cm in diameter, minimally dilated, descending is normal at 3.0 cm. Lungs/pleura: The lungs are without acute process. No focal consolidation or pulmonary edema. No evidence of pleural effusion or pneumothorax. Upper Abdomen: Limited images of the upper abdomen demonstrate no acute findings. Coarse right renal parenchymal calcification again noted. Soft Tissues/Bones: No acute bone or soft tissue abnormality. No evidence of pulmonary embolism or acute pulmonary abnormality. EKG    Normal sinus rhythm unchanged from previous EKG. All EKG's are interpreted by the Emergency Department Physician who either signs or Co-signs this chart in the absence of a cardiologist.    [facility]/EMERGENCY DEPARTMENT COURSE:    6:58 PM    ED Course as of Nov 19 0145   Sat Nov 18, 2017 1914 We will do cardiac workup as the patient has chest pain, cath done in September showed significant coronary artery disease with the cardiologist recommending CT surgery consult the patient admitted to me she saw the CT surgeon but wanted a second opinion.   [KW]   2110 Pro-BNP: (!) 1,696 [KW]   2110 D-Dimer, Quant: 0.65 [KW]   2110 Patient has elevated d-dimer as well as elevated BMP we will do CT PE rule out at this time  [KW]   2200 Patient headed for CT PE study in stable comfortable position, no acute distress  [KW]   Sun Nov 19, 2017 0144 Patient's CT PE was negative, patient was admitted to the [facility] team for further evaluation of chest pain and potential cardiac surgery planning.   [KW]   0144 Patient was moved to the floor with no acute events while in our emergency department. [KW]      ED Course User Index  [KW] Jorge Cueva DO         PROCEDURES:  None    CONSULTS:  IP CONSULT TO HOSPITALIST  IP CONSULT TO CARDIOLOGY    CRITICAL CARE:  None    FINAL IMPRESSION      1.  Chest pain, unspecified type          DISPOSITION / PLAN     DISPOSITION     PATIENT REFERRED TO:  Nilesh Lama MD  [id number] [facility]  [phone]            DISCHARGE MEDICATIONS:  Current Discharge Medication List          Jorge Cueva DO  Emergency Medicine Resident    (Please note that portions of this note were completed with a voice recognition program.  Efforts were made to edit the dictations but occasionally words are mis-transcribed.)       Jorge Cueva DO  11/19/17

## 2017-11-18 NOTE — ED NOTES
Pt arrived to ED alert and oriented x4 via LS3. Pt reports midsternal chest pain x1 hour. Pt reports that she has pressure in the middle of her chest, reports she took 3 Nitro at home and EMS gave 1 Nitro with improvement. Pt reports SOB with the chest pain. Pt reports nausea, denies abdominal pain, v/d/c. Pt placed on cardiac monitor, continuous pulse ox, and BP cuff. Dr. Bryce Cortez at bedside to assess pt. RR even and unlabored. NAD noted. Will continue to monitor.       Donavon Solano RN  11/18/17 [id number]

## 2017-11-19 LAB
ABSOLUTE EOS #: 0.24 K/UL (ref 0–0.44)
ABSOLUTE IMMATURE GRANULOCYTE: <0.03 K/UL (ref 0–0.3)
ABSOLUTE LYMPH #: 1.99 K/UL (ref 1.1–3.7)
ABSOLUTE MONO #: 0.6 K/UL (ref 0.1–1.2)
ALBUMIN SERPL-MCNC: 3.6 G/DL (ref 3.5–5.2)
ALBUMIN/GLOBULIN RATIO: 1.5 (ref 1–2.5)
ALP BLD-CCNC: 92 U/L (ref 35–104)
ALT SERPL-CCNC: 11 U/L (ref 5–33)
ANION GAP SERPL CALCULATED.3IONS-SCNC: 14 MMOL/L (ref 9–17)
AST SERPL-CCNC: 14 U/L
BASOPHILS # BLD: 1 % (ref 0–2)
BASOPHILS ABSOLUTE: 0.04 K/UL (ref 0–0.2)
BILIRUB SERPL-MCNC: 0.83 MG/DL (ref 0.3–1.2)
BNP INTERPRETATION: ABNORMAL
BUN BLDV-MCNC: 20 MG/DL (ref 8–23)
BUN/CREAT BLD: ABNORMAL (ref 9–20)
CALCIUM SERPL-MCNC: 8.6 MG/DL (ref 8.6–10.4)
CHLORIDE BLD-SCNC: 106 MMOL/L (ref 98–107)
CHOLESTEROL/HDL RATIO: 4.9
CHOLESTEROL: 207 MG/DL
CO2: 21 MMOL/L (ref 20–31)
CREAT SERPL-MCNC: 0.61 MG/DL (ref 0.5–0.9)
DIFFERENTIAL TYPE: NORMAL
EOSINOPHILS RELATIVE PERCENT: 4 % (ref 1–4)
GFR AFRICAN AMERICAN: >60 ML/MIN
GFR NON-AFRICAN AMERICAN: >60 ML/MIN
GFR SERPL CREATININE-BSD FRML MDRD: ABNORMAL ML/MIN/{1.73_M2}
GFR SERPL CREATININE-BSD FRML MDRD: ABNORMAL ML/MIN/{1.73_M2}
GLUCOSE BLD-MCNC: 87 MG/DL (ref 70–99)
HCT VFR BLD CALC: 40.2 % (ref 36.3–47.1)
HDLC SERPL-MCNC: 42 MG/DL
HEMOGLOBIN: 12.8 G/DL (ref 11.9–15.1)
IMMATURE GRANULOCYTES: 0 %
INR BLD: 1
LDL CHOLESTEROL: 138 MG/DL (ref 0–130)
LYMPHOCYTES # BLD: 34 % (ref 24–43)
MCH RBC QN AUTO: 29.5 PG (ref 25.2–33.5)
MCHC RBC AUTO-ENTMCNC: 31.8 G/DL (ref 28.4–34.8)
MCV RBC AUTO: 92.6 FL (ref 82.6–102.9)
MONOCYTES # BLD: 10 % (ref 3–12)
PDW BLD-RTO: 13.5 % (ref 11.8–14.4)
PLATELET # BLD: 227 K/UL (ref 138–453)
PLATELET ESTIMATE: NORMAL
PMV BLD AUTO: 10.2 FL (ref 8.1–13.5)
POTASSIUM SERPL-SCNC: 4.2 MMOL/L (ref 3.7–5.3)
PRO-BNP: 1218 PG/ML
PROTHROMBIN TIME: 10.3 SEC (ref 9.4–12.6)
RBC # BLD: 4.34 M/UL (ref 3.95–5.11)
RBC # BLD: NORMAL 10*6/UL
SEG NEUTROPHILS: 51 % (ref 36–65)
SEGMENTED NEUTROPHILS ABSOLUTE COUNT: 2.99 K/UL (ref 1.5–8.1)
SODIUM BLD-SCNC: 141 MMOL/L (ref 135–144)
TOTAL PROTEIN: 6 G/DL (ref 6.4–8.3)
TRIGL SERPL-MCNC: 136 MG/DL
TROPONIN INTERP: NORMAL
TROPONIN INTERP: NORMAL
TROPONIN T: <0.03 NG/ML
TROPONIN T: <0.03 NG/ML
VLDLC SERPL CALC-MCNC: ABNORMAL MG/DL (ref 1–30)
WBC # BLD: 5.9 K/UL (ref 3.5–11.3)
WBC # BLD: NORMAL 10*3/UL

## 2017-11-19 PROCEDURE — 1200000000 HC SEMI PRIVATE

## 2017-11-19 PROCEDURE — 85610 PROTHROMBIN TIME: CPT

## 2017-11-19 PROCEDURE — 83880 ASSAY OF NATRIURETIC PEPTIDE: CPT

## 2017-11-19 PROCEDURE — 6370000000 HC RX 637 (ALT 250 FOR IP): Performed by: NURSE PRACTITIONER

## 2017-11-19 PROCEDURE — 84484 ASSAY OF TROPONIN QUANT: CPT

## 2017-11-19 PROCEDURE — 36415 COLL VENOUS BLD VENIPUNCTURE: CPT

## 2017-11-19 PROCEDURE — 94762 N-INVAS EAR/PLS OXIMTRY CONT: CPT

## 2017-11-19 PROCEDURE — 85025 COMPLETE CBC W/AUTO DIFF WBC: CPT

## 2017-11-19 PROCEDURE — 80061 LIPID PANEL: CPT

## 2017-11-19 PROCEDURE — 2580000003 HC RX 258: Performed by: NURSE PRACTITIONER

## 2017-11-19 PROCEDURE — 99222 1ST HOSP IP/OBS MODERATE 55: CPT | Performed by: FAMILY MEDICINE

## 2017-11-19 PROCEDURE — 80053 COMPREHEN METABOLIC PANEL: CPT

## 2017-11-19 RX ORDER — MORPHINE SULFATE 4 MG/ML
2 INJECTION, SOLUTION INTRAMUSCULAR; INTRAVENOUS
Status: DISCONTINUED | OUTPATIENT
Start: 2017-11-19 | End: 2017-11-20 | Stop reason: HOSPADM

## 2017-11-19 RX ORDER — RANOLAZINE 500 MG/1
1000 TABLET, EXTENDED RELEASE ORAL 2 TIMES DAILY
Status: DISCONTINUED | OUTPATIENT
Start: 2017-11-19 | End: 2017-11-20 | Stop reason: HOSPADM

## 2017-11-19 RX ORDER — HYDROCODONE BITARTRATE AND ACETAMINOPHEN 5; 325 MG/1; MG/1
1 TABLET ORAL EVERY 4 HOURS PRN
Status: DISCONTINUED | OUTPATIENT
Start: 2017-11-19 | End: 2017-11-20 | Stop reason: HOSPADM

## 2017-11-19 RX ORDER — POTASSIUM CHLORIDE 20 MEQ/1
40 TABLET, EXTENDED RELEASE ORAL PRN
Status: DISCONTINUED | OUTPATIENT
Start: 2017-11-19 | End: 2017-11-20 | Stop reason: HOSPADM

## 2017-11-19 RX ORDER — ACETAMINOPHEN 325 MG/1
650 TABLET ORAL EVERY 4 HOURS PRN
Status: DISCONTINUED | OUTPATIENT
Start: 2017-11-19 | End: 2017-11-20 | Stop reason: HOSPADM

## 2017-11-19 RX ORDER — MAGNESIUM SULFATE 1 G/100ML
1 INJECTION INTRAVENOUS PRN
Status: DISCONTINUED | OUTPATIENT
Start: 2017-11-19 | End: 2017-11-20 | Stop reason: HOSPADM

## 2017-11-19 RX ORDER — SODIUM CHLORIDE 9 MG/ML
INJECTION, SOLUTION INTRAVENOUS CONTINUOUS
Status: DISCONTINUED | OUTPATIENT
Start: 2017-11-19 | End: 2017-11-19

## 2017-11-19 RX ORDER — NITROGLYCERIN 0.4 MG/1
0.4 TABLET SUBLINGUAL EVERY 5 MIN PRN
Status: DISCONTINUED | OUTPATIENT
Start: 2017-11-19 | End: 2017-11-20 | Stop reason: HOSPADM

## 2017-11-19 RX ORDER — ATORVASTATIN CALCIUM 80 MG/1
80 TABLET, FILM COATED ORAL NIGHTLY
Status: DISCONTINUED | OUTPATIENT
Start: 2017-11-19 | End: 2017-11-20 | Stop reason: HOSPADM

## 2017-11-19 RX ORDER — SODIUM CHLORIDE 0.9 % (FLUSH) 0.9 %
10 SYRINGE (ML) INJECTION PRN
Status: DISCONTINUED | OUTPATIENT
Start: 2017-11-19 | End: 2017-11-20 | Stop reason: HOSPADM

## 2017-11-19 RX ORDER — MORPHINE SULFATE 4 MG/ML
4 INJECTION, SOLUTION INTRAMUSCULAR; INTRAVENOUS
Status: DISCONTINUED | OUTPATIENT
Start: 2017-11-19 | End: 2017-11-20 | Stop reason: HOSPADM

## 2017-11-19 RX ORDER — POTASSIUM CHLORIDE 7.45 MG/ML
10 INJECTION INTRAVENOUS PRN
Status: DISCONTINUED | OUTPATIENT
Start: 2017-11-19 | End: 2017-11-20 | Stop reason: HOSPADM

## 2017-11-19 RX ORDER — POTASSIUM CHLORIDE 20MEQ/15ML
40 LIQUID (ML) ORAL PRN
Status: DISCONTINUED | OUTPATIENT
Start: 2017-11-19 | End: 2017-11-20 | Stop reason: HOSPADM

## 2017-11-19 RX ORDER — METOPROLOL TARTRATE 50 MG/1
25 TABLET, FILM COATED ORAL 2 TIMES DAILY
Status: DISCONTINUED | OUTPATIENT
Start: 2017-11-19 | End: 2017-11-20 | Stop reason: HOSPADM

## 2017-11-19 RX ORDER — ASPIRIN 81 MG/1
324 TABLET, CHEWABLE ORAL ONCE
Status: DISCONTINUED | OUTPATIENT
Start: 2017-11-19 | End: 2017-11-20 | Stop reason: HOSPADM

## 2017-11-19 RX ORDER — HYDROCODONE BITARTRATE AND ACETAMINOPHEN 5; 325 MG/1; MG/1
2 TABLET ORAL EVERY 4 HOURS PRN
Status: DISCONTINUED | OUTPATIENT
Start: 2017-11-19 | End: 2017-11-20 | Stop reason: HOSPADM

## 2017-11-19 RX ORDER — LISINOPRIL 2.5 MG/1
2.5 TABLET ORAL DAILY
Status: DISCONTINUED | OUTPATIENT
Start: 2017-11-19 | End: 2017-11-20 | Stop reason: HOSPADM

## 2017-11-19 RX ORDER — FAMOTIDINE 20 MG/1
20 TABLET, FILM COATED ORAL 2 TIMES DAILY
Status: DISCONTINUED | OUTPATIENT
Start: 2017-11-19 | End: 2017-11-20 | Stop reason: HOSPADM

## 2017-11-19 RX ORDER — SODIUM CHLORIDE 0.9 % (FLUSH) 0.9 %
10 SYRINGE (ML) INJECTION EVERY 12 HOURS SCHEDULED
Status: DISCONTINUED | OUTPATIENT
Start: 2017-11-19 | End: 2017-11-20 | Stop reason: HOSPADM

## 2017-11-19 RX ORDER — ONDANSETRON 2 MG/ML
4 INJECTION INTRAMUSCULAR; INTRAVENOUS EVERY 6 HOURS PRN
Status: DISCONTINUED | OUTPATIENT
Start: 2017-11-19 | End: 2017-11-20 | Stop reason: HOSPADM

## 2017-11-19 RX ADMIN — METOPROLOL TARTRATE 25 MG: 50 TABLET, FILM COATED ORAL at 02:32

## 2017-11-19 RX ADMIN — FAMOTIDINE 20 MG: 20 TABLET, FILM COATED ORAL at 10:29

## 2017-11-19 RX ADMIN — Medication 10 ML: at 10:45

## 2017-11-19 RX ADMIN — LISINOPRIL 2.5 MG: 2.5 TABLET ORAL at 10:29

## 2017-11-19 RX ADMIN — SODIUM CHLORIDE: 9 INJECTION, SOLUTION INTRAVENOUS at 01:54

## 2017-11-19 RX ADMIN — ATORVASTATIN CALCIUM 80 MG: 80 TABLET, FILM COATED ORAL at 02:32

## 2017-11-19 RX ADMIN — ATORVASTATIN CALCIUM 80 MG: 80 TABLET, FILM COATED ORAL at 22:22

## 2017-11-19 RX ADMIN — FAMOTIDINE 20 MG: 20 TABLET, FILM COATED ORAL at 22:23

## 2017-11-19 RX ADMIN — Medication 10 ML: at 22:25

## 2017-11-19 ASSESSMENT — PAIN SCALES - GENERAL
PAINLEVEL_OUTOF10: 0
PAINLEVEL_OUTOF10: 3
PAINLEVEL_OUTOF10: 0

## 2017-11-19 ASSESSMENT — PAIN DESCRIPTION - LOCATION: LOCATION: CHEST

## 2017-11-19 ASSESSMENT — ENCOUNTER SYMPTOMS
EYES NEGATIVE: 1
NAUSEA: 1
VOMITING: 0
CONSTIPATION: 1
SHORTNESS OF BREATH: 1

## 2017-11-19 ASSESSMENT — PAIN DESCRIPTION - ORIENTATION: ORIENTATION: MID

## 2017-11-19 ASSESSMENT — PAIN DESCRIPTION - FREQUENCY: FREQUENCY: INTERMITTENT

## 2017-11-19 ASSESSMENT — PAIN DESCRIPTION - DESCRIPTORS: DESCRIPTORS: DISCOMFORT

## 2017-11-19 NOTE — CONSULTS
[facility]  In Patient Cardiology Consult             Date:   11/19/2017  Patient name: Glenn Copeland  Date of admission:  11/18/2017  6:54 PM  MRN:   [id number]  YOB: 1955    Reason for Admission: Chest pain, Shortness of breath    CHIEF COMPLAINT: Chest pain, Shortness of breath     History Obtained From: Patient, EPIC    HISTORY OF PRESENT ILLNESS:      Mary Camarillo is a 60year old female admitted with chest pain associated with shortness of breath. She initially presented to [facility] on September 2017 at which time she underwent cardiac cath which revealed multivessel disease. We did recommend a CV surgery. CV surgery evaluated the patient and the patient did not want CV surgery at the time. Since then she states she wants a second opinion and does not want surgery and possible block complex PCI. She presents with chest pain described as nonradiating and was relieved with some nitro. Past Medical History:   has a past medical history of Arthritis; CAD (coronary artery disease); Carotid artery stenosis; Chronic back pain; Clotting disorder ([address] COPD (chronic obstructive pulmonary disease) ([facility] [address] Disease of blood and blood forming organ; Drug abuse; GERD (gastroesophageal reflux disease); H/O blood clots; Head injury; Heart attack; Hyperlipidemia; Hypertension; Prediabetes; and Stomach ulcer. Past Surgical History:   has a past surgical history that includes Coronary angioplasty with stent (2/14/2006,04/2013); Hysterectomy (2005); Tonsillectomy (1960); Tubal ligation (1979); skin biopsy; Endoscopy, colon, diagnostic; and cyst removal (Right, 9/26/2013). Home Medications:    Prior to Admission medications    Medication Sig Start Date End Date Taking? Authorizing Provider   lisinopril (PRINIVIL;ZESTRIL) 2.5 MG tablet Take 1 tablet by mouth daily 11/16/17   Adalid Jackson MD   Blood Pressure Monitoring ([employer] BP MONITOR/ARM) SANDY Dx: Essential Hypertension. Monitor BP daily and log 10/2/17   Osvaldo Mayorga MD   atorvastatin (LIPITOR) 80 MG tablet Take 1 tablet by mouth nightly 9/21/17   Noah Louie MD   metoprolol tartrate (LOPRESSOR) 25 MG tablet Take 1 tablet by mouth 2 times daily 9/21/17   Noah Louie MD   cephALEXin (KEFLEX) 500 MG capsule Take 1 capsule by mouth 4 times daily 9/21/17   Noah Louie MD   aspirin 81 MG tablet Take 81 mg by mouth daily    Historical Provider, MD   ibuprofen (ADVIL;MOTRIN) 200 MG tablet Take 200 mg by mouth every 6 hours as needed for Pain    Historical Provider, MD   NITROSTAT 0.4 MG SL tablet DISSOLVE 1 TAB UNDER TONGUE FOR CHEST PAIN - IF PAIN REMAINS AFTER 5 MIN, CALL 911 AND REPEAT DOSE. MAX 3 TABS IN 15 MINUTES 7/23/15   Wendi Nazario MD       Allergies:  Latex; Codeine; and Tape [adhesive tape]    Social History:   reports that she has been smoking Cigarettes. She has a 9.25 pack-year smoking history. She has never used smokeless tobacco. She reports that she drinks alcohol. She reports that she uses drugs, including Marijuana. Family History:   negative for early CAD    REVIEW OF SYSTEMS:    · Constitutional: there has been no unanticipated weight loss. There's been No change in energy level, No change in activity level. · Eyes: No visual changes or diplopia. No scleral icterus. · ENT: No Headaches, hearing loss or vertigo. No mouth sores or sore throat. · Cardiovascular: As HPI  · Respiratory: As HPI  · Gastrointestinal: No abdominal pain, appetite loss, blood in stools. No change in bowel or bladder habits. · Genitourinary: No dysuria, trouble voiding, or hematuria. · Musculoskeletal:  No gait disturbance, No weakness or joint complaints. · Integumentary: No rash or pruritis. · Neurological: No headache, diplopia, change in muscle strength, numbness or tingling. No change in gait, balance, coordination, mood, affect, memory, mentation, behavior.   · Psychiatric: No anxiety, or depression. · Endocrine: No temperature intolerance. No excessive thirst, fluid intake, or urination. No tremor. · Hematologic/Lymphatic: No abnormal bruising or bleeding, blood clots or swollen lymph nodes. · Allergic/Immunologic: No nasal congestion or hives. PHYSICAL EXAM:    Physical Examination:    /79   Pulse 61   Temp 94.9 °F (34.9 °C) (Oral) Comment: pt is eating ice chips  Resp 18   Ht 5' 1\" (1.549 m)   Wt 174 lb 8 oz (79.2 kg)   SpO2 95%   BMI 32.97 kg/m²    Constitutional and General Appearance: alert, cooperative, no distress and appears stated age  HEENT: PERRL, no cervical lymphadenopathy. No masses palpable. Normal oral mucosa  Respiratory:  · Normal excursion and expansion without use of accessory muscles  · Resp Auscultation: Good respiratory effort. No for increased work of breathing. On auscultation: clear  Cardiovascular:  · The apical impulse is not displaced  · Heart tones are crisp and normal. regular S1 and S2. Murmurs: none  · Jugular venous pulsation Normal  · The carotid upstroke is normal in amplitude and contour without delay or bruit  · Peripheral pulses are symmetrical and full   Abdomen:  · No masses or tenderness  · Bowel sounds present  Extremities:  ·  No Cyanosis or Clubbing  ·  Lower extremity edema: none  ·  Skin: Warm and dry  Neurological:  · Alert and oriented. · Moves all extremities well  · No abnormalities of mood, affect, memory, mentation, or behavior are noted    DATA:    Diagnostics:      CTS 9/20/17: Pt seen and examined, she is a candidate for CABG and LVA resection. Pt wants to go home and come back for surgery, discussed with Dr. Jamaica Bonner, will get PFTs, carotid dopplers and if she has no CP or dyspnea on walking around in the unit, she could go home and come back for early surgery. Alyx Oneill MD     CATH 9/20/17: MVD. 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      STRESS 9/18/17: Findings concerning for infarct of the apex, lateral and anterior walls.  No reversible defect. EF 27%.       ECHO 4/5/14: EF 50%. Trileaflet aortic valve w/ calcification noted on the left coronary cusp.      Labs:     CBC:   Recent Labs      11/18/17 1905   WBC  8.9   HGB  13.7   HCT  42.2   PLT  See Reflexed IPF Result     BMP:   Recent Labs      11/18/17 1905   NA  135   K  4.1   CO2  20   BUN  22   CREATININE  0.72   LABGLOM  >60   GLUCOSE  121*     CARDIAC ENZYMES:  Recent Labs      11/18/17 1904 11/18/17 2127   TROPONINI  0.01  0.01      FASTING LIPID PANEL:  Lab Results   Component Value Date    HDL 44 09/16/2017    TRIG 151 09/16/2017       IMPRESSION:    Patient Active Problem List   Diagnosis    CAD (coronary artery disease)    Chronic bronchitis (HCC)    Smoker    GERD (gastroesophageal reflux disease)    Thoracic scoliosis    Ganglion cyst    CAD S/P percutaneous coronary angioplasty (4/2013-Dr. Sunshine Jennings)    CAD S/P percutaneous coronary angioplasty-open stent( 7/29/2013-Dr. Tre France)    Chest pain    CAD (coronary artery disease)    HTN (hypertension)    Atypical chest pain    S/P - Mid RCA 10/17/14-Dr. nichols    S/P stents LAD, LCX and proximal RCA -     Hyperlipidemia    Chest pain     HLD (hyperlipidemia)    GERD (gastroesophageal reflux disease)    Chronic back pain    Precordial pain    Breast cancer screening    Lumbosacral pain    Low back pain with sciatica    Intervertebral disk disease    Midline low back pain without sciatica    Hip pain    Essential hypertension    Headache in front of head    Accelerated hypertension    Speech disturbance    RBBB (right bundle branch block)    COPD (ch[facility] obstructive pulmonary disease) ([facility])    S/P cardiac cath - MV CAD 9/20/17 Dr. Zeyad Newberry Non-compliance    Cellulitis of arm, right    Superficial thrombophlebitis of right upper extremity     - Multivessel coronary artery disease initially diagnosed on cardiac cath 9 2017- She had a 70% mid LAD, 80% OM1 90% OM 2, 75% RCA and 80% PDA  -

## 2017-11-19 NOTE — H&P
Hemoglobin 13.7 11.9 - 15.1 g/dL    Hematocrit 42.2 36.3 - 47.1 %    MCV 91.5 82.6 - 102.9 fL    MCH 29.7 25.2 - 33.5 pg    MCHC 32.5 28.4 - 34.8 g/dL    RDW 13.5 11.8 - 14.4 %    Platelets See Reflexed IPF Result 138 - 453 k/uL    MPV NOT REPORTED 8.1 - 13.5 fL    Differential Type NOT REPORTED     WBC Morphology NOT REPORTED     RBC Morphology NOT REPORTED     Platelet Estimate NOT REPORTED     Seg Neutrophils 51 36 - 65 %    Lymphocytes 36 24 - 43 %    Monocytes 9 3 - 12 %    Eosinophils % 3 1 - 4 %    Basophils 0 0 - 2 %    Immature Granulocytes 0 0 %    Segs Absolute 4.58 1.50 - 8.10 k/uL    Absolute Lymph # 3.18 1.10 - 3.70 k/uL    Absolute Mono # 0.81 0.10 - 1.20 k/uL    Absolute Eos # 0.29 0.00 - 0.44 k/uL    Basophils # 0.04 0.00 - 0.20 k/uL    Absolute Immature Granulocyte <0.03 0.00 - 0.30 k/uL   Basic Metabolic Panel    Collection Time: 11/18/17  7:05 PM   Result Value Ref Range    Glucose 121 (H) 70 - 99 mg/dL    BUN 22 8 - 23 mg/dL    CREATININE 0.72 0.50 - 0.90 mg/dL    Bun/Cre Ratio NOT REPORTED 9 - 20    Calcium 8.9 8.6 - 10.4 mg/dL    Sodium 135 135 - 144 mmol/L    Potassium 4.1 3.7 - 5.3 mmol/L    Chloride 100 98 - 107 mmol/L    CO2 20 20 - 31 mmol/L    Anion Gap 15 9 - 17 mmol/L    GFR Non-African American >60 >60 mL/min    GFR African American >60 >60 mL/min    GFR Comment          GFR Staging NOT REPORTED    D-Dimer, Quantitative    Collection Time: 11/18/17  7:05 PM   Result Value Ref Range    D-Dimer, Quant 0.65 mg/L FEU   Immature Platelet Fraction    Collection Time: 11/18/17  7:05 PM   Result Value Ref Range    Platelet, Immature Fraction NOT REPORTED 1.1 - 10.3 %    Platelet, Fluorescence Platelet clumps present, count appears adequate.  138 - 453 k/uL   Brain Natriuretic Peptide    Collection Time: 11/18/17  7:05 PM   Result Value Ref Range    Pro-BNP 1,696 (H) <300 pg/mL    BNP Interpretation         POCT troponin    Collection Time: 11/18/17  9:27 PM   Result Value Ref Range    POC Troponin I 0.01 0.00 - 0.10 ng/mL    POC Troponin Interp       The Troponin-I (POC) results cannot be compared to the Troponin-T results.    Brain natriuretic peptide    Collection Time: 11/19/17  6:57 AM   Result Value Ref Range    Pro-BNP 1,218 (H) <300 pg/mL    BNP Interpretation         CBC auto differential    Collection Time: 11/19/17  6:57 AM   Result Value Ref Range    WBC 5.9 3.5 - 11.3 k/uL    RBC 4.34 3.95 - 5.11 m/uL    Hemoglobin 12.8 11.9 - 15.1 g/dL    Hematocrit 40.2 36.3 - 47.1 %    MCV 92.6 82.6 - 102.9 fL    MCH 29.5 25.2 - 33.5 pg    MCHC 31.8 28.4 - 34.8 g/dL    RDW 13.5 11.8 - 14.4 %    Platelets 110 795 - 288 k/uL    MPV 10.2 8.1 - 13.5 fL    Differential Type NOT REPORTED     WBC Morphology NOT REPORTED     RBC Morphology NOT REPORTED     Platelet Estimate NOT REPORTED     Seg Neutrophils 51 36 - 65 %    Lymphocytes 34 24 - 43 %    Monocytes 10 3 - 12 %    Eosinophils % 4 1 - 4 %    Basophils 1 0 - 2 %    Immature Granulocytes 0 0 %    Segs Absolute 2.99 1.50 - 8.10 k/uL    Absolute Lymph # 1.99 1.10 - 3.70 k/uL    Absolute Mono # 0.60 0.10 - 1.20 k/uL    Absolute Eos # 0.24 0.00 - 0.44 k/uL    Basophils # 0.04 0.00 - 0.20 k/uL    Absolute Immature Granulocyte <0.03 0.00 - 0.30 k/uL   Comprehensive metabolic panel    Collection Time: 11/19/17  6:57 AM   Result Value Ref Range    Glucose 87 70 - 99 mg/dL    BUN 20 8 - 23 mg/dL    CREATININE 0.61 0.50 - 0.90 mg/dL    Bun/Cre Ratio NOT REPORTED 9 - 20    Calcium 8.6 8.6 - 10.4 mg/dL    Sodium 141 135 - 144 mmol/L    Potassium 4.2 3.7 - 5.3 mmol/L    Chloride 106 98 - 107 mmol/L    CO2 21 20 - 31 mmol/L    Anion Gap 14 9 - 17 mmol/L    Alkaline Phosphatase 92 35 - 104 U/L    ALT 11 5 - 33 U/L    AST 14 <32 U/L    Total Bilirubin 0.83 0.3 - 1.2 mg/dL    Total Protein 6.0 (L) 6.4 - 8.3 g/dL    Alb 3.6 3.5 - 5.2 g/dL    Albumin/Globulin Ratio 1.5 1.0 - 2.5    GFR Non-African American >60 >60 mL/min    GFR African American >60 >60 mL/min    GFR Comment          GFR Staging NOT REPORTED    Lipid panel - fasting    Collection Time: 11/19/17  6:57 AM   Result Value Ref Range    Cholesterol 207 (H) <200 mg/dL    HDL 42 >40 mg/dL    LDL Cholesterol 138 (H) 0 - 130 mg/dL    Chol/HDL Ratio 4.9 <5    Triglycerides 136 <150 mg/dL    VLDL NOT REPORTED 1 - 30 mg/dL   Protime-INR    Collection Time: 11/19/17  6:57 AM   Result Value Ref Range    Protime 10.3 9.4 - 12.6 sec    INR 1.0    Troponin    Collection Time: 11/19/17  6:57 AM   Result Value Ref Range    Troponin T <0.03 <0.03 ng/mL    Troponin Interp             Imaging/Diagnostics:    Xr Chest Portable    Result Date: 11/18/2017  EXAMINATION: SINGLE VIEW OF THE CHEST 11/18/2017 7:22 pm COMPARISON: 09/15/2017. HISTORY: ORDERING SYSTEM PROVIDED HISTORY: chest pain TECHNOLOGIST PROVIDED HISTORY: Reason for exam:->chest pain FINDINGS: The cardiac silhouette is stable. Aortic vascular calcification with mild ectasia. The lungs are clear. No infiltrate, pleural fluid or overt failure. No acute osseous findings. No acute cardiopulmonary disease. Ct Chest Pulmonary Embolism W Contrast    Result Date: 11/18/2017  EXAMINATION: CTA OF THE CHEST 11/18/2017 10:08 pm TECHNIQUE: CTA of the chest was performed after the administration of intravenous contrast.  Multiplanar reformatted images are provided for review. MIP images are provided for review. Dose modulation, iterative reconstruction, and/or weight based adjustment of the mA/kV was utilized to reduce the radiation dose to as low as reasonably achievable. COMPARISON: 09/01/2014 HISTORY: ORDERING SYSTEM PROVIDED HISTORY: sob chest pain, elevated ddimer FINDINGS: Pulmonary Arteries: Pulmonary arteries are adequately opacified for evaluation. No evidence of intraluminal filling defect to suggest pulmonary embolism. Main pulmonary artery is normal in caliber. Mediastinum: No evidence of mediastinal lymphadenopathy.   The heart and pericardium demonstrate no acute abnormality. The heart is enlarged. There is coronary artery disease There is no acute abnormality of the thoracic aorta. Ascending aorta is measured at 4.1 cm in diameter, minimally dilated, descending is normal at 3.0 cm. Lungs/pleura: The lungs are without acute process. No focal consolidation or pulmonary edema. No evidence of pleural effusion or pneumothorax. Upper Abdomen: Limited images of the upper abdomen demonstrate no acute findings. Coarse right renal parenchymal calcification again noted. Soft Tissues/Bones: No acute bone or soft tissue abnormality. No evidence of pulmonary embolism or acute pulmonary abnormality. Assessment :      Primary Problem  CAD (coronary artery disease)    Active Hospital Problems    Diagnosis Date Noted    S/P cardiac cath - MV CAD 9/20/17 Dr. Christiano Price [Z98.890] 09/20/2017     Priority: High    Chest pain [R07.9] 09/02/2014    CAD (coronary artery disease) [I25.10] 09/02/2014       Plan:     Patient status Admit as inpatient in the  Med/Surge    1. Multivessel CAD   2. Pt continues to refused CABG. Wants second opinion- pt adamant. States that even if Dr. Christiano Price recommends CABG, she will not surgery   3. Cardio consult noted- add ranexa  4. Possible stents tomorrow. Consultations:   IP CONSULT TO HOSPITALIST  IP CONSULT TO CARDIOLOGY     Patient is admitted as inpatient status because of co-morbidities listed above, severity of signs and symptoms as outlined, requirement for current medical therapies and most importantly because of direct risk to patient if care not provided in a hospital setting.     Rudy Reyes MD  11/19/2017  10:19 AM    Copy sent to Dr. Naina Woodall MD

## 2017-11-19 NOTE — ED PROVIDER NOTES
[facility]  ED  eMERGENCY dEPARTMENT eNCOUnter   Attending Attestation     Pt Name: Latrice Rushing  MRN: [id number]  Kamillegfzoltan 1955  Date of evaluation: 11/18/17       Latrice Rushing is a 60 y.o. female who presents with Chest Pain (Pt reports midsternal chest pain x1 hour, took 3 Nitro at home and given 1 Nitro per EMS); Shortness of Breath; and Nausea      History: Patient has chest pain, shortness of breath, nausea. Patient said that she was evaluated for a CABG recently but wanted a second opinion. Patient left the hospital did not get a second opinion. Patient says she's also get this on December 6. Patient says that her symptoms have been getting worse and today is having continued chest pain and shortness of breath. Patient took nitro at home which  Did not help much. Exam:   Physical Exam   Constitutional: She is oriented to person, place, and time and well-developed, well-nourished, and in no distress. HENT:   Head: Normocephalic and atraumatic. Eyes: EOM are normal. Pupils are equal, round, and reactive to light. Right eye exhibits no discharge. Left eye exhibits no discharge. Neck: Normal range of motion. No JVD present. No tracheal deviation present. Cardiovascular: Normal rate, regular rhythm, normal heart sounds and intact distal pulses. Exam reveals no gallop and no friction rub. No murmur heard. Pulmonary/Chest: Effort normal and breath sounds normal. No respiratory distress. She has no wheezes. She has no rales. Abdominal: Soft. Bowel sounds are normal. She exhibits no distension and no mass. There is no tenderness. There is no rebound and no guarding. Musculoskeletal: Normal range of motion. She exhibits no edema or tenderness. Neurological: She is alert and oriented to person, place, and time. No cranial nerve deficit. Coordination normal. GCS score is 15. Skin: Skin is warm and dry. No rash noted. She is not diaphoretic. No erythema.    Psychiatric:

## 2017-11-19 NOTE — PROGRESS NOTES
Cardiac Testing     CTS 9/20/17: Pt seen and examined, she is a candidate for CABG and LVA resection. Pt wants to go home and come back for surgery, discussed with Dr. Marizol Bourgeois, will get PFTs, carotid dopplers and if she has no CP or dyspnea on walking around in the unit, she could go home and come back for early surgery. Skylar Giron MD    CATH 9/20/17: MVD. 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 STRESS 9/18/17: Findings concerning for infarct of the apex, lateral and anterior walls. No reversible defect. EF 27%.       ECHO 4/5/14: EF 50%. Trileaflet aortic valve w/ calcification noted on the left coronary cusp.      1. History of coronary artery disease with PCI of the LAD and left circ. before with admission to the hospital on October 17, 2014, with inferior ST elevation myocardial infarction. She did have coronary angiography done which showed the left main to be normal, LAD had patent stents, left circ. had patent stents, RCA had proximal stent with 40% instent restenosis, the mid segment had 100% occlusion, which was reduced to 0% using 2.75 x 18 mm drug-eluting stent. Her left ventricular ejection fraction was 50%. She has been maintained on aspirin, Lipitor, lisinopril, metoprolol and Plavix since then. Her last lipid profile was done on October 18, 2014, and her LDL was 126, her HDL was 31 and her triglyceride was 84. 2.History of hypertension. 3.History of hyperlipidemia. 4.History of chronic smoking. I did  her regarding stopping smoking. 5.Stress test 11/19/2015: Stable appearing large anterior wall fixed defect compatible with patients known infarction and essentially unchanged as compared to prior study. EF 58%.

## 2017-11-20 VITALS
RESPIRATION RATE: 16 BRPM | HEART RATE: 78 BPM | OXYGEN SATURATION: 97 % | DIASTOLIC BLOOD PRESSURE: 80 MMHG | SYSTOLIC BLOOD PRESSURE: 167 MMHG | WEIGHT: 174.5 LBS | HEIGHT: 61 IN | TEMPERATURE: 97.3 F | BODY MASS INDEX: 32.94 KG/M2

## 2017-11-20 PROBLEM — Z72.0 TOBACCO USE: Status: ACTIVE | Noted: 2017-11-20

## 2017-11-20 PROCEDURE — 99221 1ST HOSP IP/OBS SF/LOW 40: CPT | Performed by: NURSE PRACTITIONER

## 2017-11-20 PROCEDURE — 99232 SBSQ HOSP IP/OBS MODERATE 35: CPT | Performed by: INTERNAL MEDICINE

## 2017-11-20 PROCEDURE — 6370000000 HC RX 637 (ALT 250 FOR IP): Performed by: NURSE PRACTITIONER

## 2017-11-20 PROCEDURE — 2580000003 HC RX 258: Performed by: NURSE PRACTITIONER

## 2017-11-20 PROCEDURE — 94762 N-INVAS EAR/PLS OXIMTRY CONT: CPT

## 2017-11-20 RX ORDER — RANOLAZINE 1000 MG/1
1000 TABLET, EXTENDED RELEASE ORAL 2 TIMES DAILY
Qty: 60 TABLET | Refills: 3 | Status: SHIPPED | OUTPATIENT
Start: 2017-11-20 | End: 2018-04-20

## 2017-11-20 RX ADMIN — Medication 10 ML: at 08:24

## 2017-11-20 RX ADMIN — FAMOTIDINE 20 MG: 20 TABLET, FILM COATED ORAL at 12:19

## 2017-11-20 RX ADMIN — LISINOPRIL 2.5 MG: 2.5 TABLET ORAL at 12:19

## 2017-11-20 ASSESSMENT — ENCOUNTER SYMPTOMS
VOMITING: 0
SHORTNESS OF BREATH: 1
SPUTUM PRODUCTION: 0
HEARTBURN: 0
NAUSEA: 0
COUGH: 0

## 2017-11-20 NOTE — PROGRESS NOTES
[facility]   Progress Note                   Date:   11/20/2017  Patient name: Flory Magdaleon  Date of admission:  11/18/2017  6:54 PM  MRN:   [id number]  YOB: 1955  PCP: Carolyn Mcadams MD    Reason for Admission: Chest pain [R07.9]    Subjective:       Clinical Changes / Abnormalities: Pt seen and examined. Pt resting in bed. Pt states that she is not having any chest pain and shortness of breath. Discussed Dr. Sajan Bright recommendations with patient and she continues to refuse OHS and would like a second opinion from another cardiologist on the possibility of stents. She is refusing to have that done at this time and states she will do it as outpatient. Discussed likelihood of her having continued chest pain at home and she continues to wish to go home. Risks reviewed in detail. Medications:   Scheduled Meds:   atorvastatin  80 mg Oral Nightly    lisinopril  2.5 mg Oral Daily    metoprolol tartrate  25 mg Oral BID    sodium chloride flush  10 mL Intravenous 2 times per day    famotidine  20 mg Oral BID    aspirin  324 mg Oral Once    aspirin  325 mg Oral Daily    enoxaparin  40 mg Subcutaneous Daily    ranolazine  1,000 mg Oral BID     Continuous Infusions:   CBC:   Recent Labs      11/18/17 1905 11/19/17   0657   WBC  8.9  5.9   HGB  13.7  12.8   PLT  See Reflexed IPF Result  227     BMP:    Recent Labs      11/18/17 1905 11/19/17   0657   NA  135  141   K  4.1  4.2   CL  100  106   CO2  20  21   BUN  22  20   CREATININE  0.72  0.61   GLUCOSE  121*  87     Hepatic:   Recent Labs      11/19/17   0657   AST  14   ALT  11   BILITOT  0.83   ALKPHOS  92     Troponin:   Recent Labs      11/18/17 1904 11/18/17 2127   TROPONINI  0.01  0.01     BNP: No results for input(s): BNP in the last 72 hours.   Lipids:   Recent Labs      11/19/17   0657   CHOL  207*   HDL  42     INR:   Recent Labs      11/19/17   0657   INR  1.0        CTS 9/20/17: Pt seen and examined,

## 2017-11-20 NOTE — CARE COORDINATION
Attempted to call cab for patient thru he [employer]/Medicaid. Was instructed to call [employer]. Called, was informed that she qualifies for the cab service but has not signed up for the service. Patient informed .

## 2017-11-20 NOTE — PROGRESS NOTES
[facility]  Progress Note    2017       Name:  Yamil Reyes  MRN:    [id number]     Acct:     [de-identified]   Room:  [facility] Day: 2     Admit Date: 2017  6:54 PM  PCP: Souleymane Aguayo MD    Subjective;  C/C:    Chief Complaint   Patient presents with    Chest Pain     Pt reports midsternal chest pain x1 hour, took 3 Nitro at home and given 1 Nitro per EMS    Shortness of Breath    Nausea       Interval History:  Status:  improved  No chest pain  No shortness of breath  Wants to go home    The patient is refusing further cardiac evaluation  She does not want to consider cardiac cath and stents  She does not want to consider coronary bypass grafting at this time  The patient wants to go home and start preparing for Thanksgiving dinner  She is aware of the risk of leaving the hospital before her cardiac status has been stabilized    ROS:  Review of Systems   Constitutional: Negative for chills and fever. Respiratory: Positive for shortness of breath (LYNN). Negative for cough and sputum production. Cardiovascular: Negative for chest pain, palpitations, leg swelling and PND. Gastrointestinal: Negative for heartburn, nausea and vomiting. Genitourinary: Negative for flank pain and hematuria. Musculoskeletal: Negative for myalgias and neck pain. Psychiatric/Behavioral: The patient is nervous/anxious (she does acknowledge she feels stressed). Physical:  BP (!) 167/80   Pulse 78   Temp 97.3 °F (36.3 °C) (Oral)   Resp 16   Ht 5' 1\" (1.549 m)   Wt 174 lb 8 oz (79.2 kg)   SpO2 97%   BMI 32.97 kg/m²   Temp (24hrs), Av.9 °F (36.6 °C), Min:97.3 °F (36.3 °C), Max:98.5 °F (36.9 °C)    Physical Exam   Constitutional: She is oriented to person, place, and time. No distress. HENT:   Head: Normocephalic and atraumatic. Eyes: Conjunctivae are normal. No scleral icterus. Neck: Neck supple. No JVD present. Cardiovascular: Normal rate and regular rhythm. filed at 11/19/17 1637   Gross per 24 hour   Intake              480 ml   Output              225 ml   Net              255 ml       Labs:  Hematology:  Recent Labs      11/18/17 1905 11/19/17   0657   WBC  8.9  5.9   HGB  13.7  12.8   HCT  42.2  40.2   PLT  See Reflexed IPF Result  227   INR   --   1.0     Chemistry:  Recent Labs      11/18/17 1905 11/19/17   0657   NA  135  141   K  4.1  4.2   CL  100  106   CO2  20  21   GLUCOSE  121*  87   BUN  22  20   CREATININE  0.72  0.61   CALCIUM  8.9  8.6     Recent Labs      11/18/17 1904 11/18/17 2127 11/19/17   0657   PROT   --    --   6.0*   LABALBU   --    --   3.6   AST   --    --   14   ALT   --    --   11   ALKPHOS   --    --   92   BILITOT   --    --   0.83   CHOL   --    --   207*   TRIG   --    --   136   HDL   --    --   42   TROPONINI  0.01  0.01   --      No results for input(s): POCGLU in the last 72 hours.     Assessment:  Primary Problem  CAD (coronary artery disease)     Active Hospital Problems    Diagnosis Date Noted    S/P cardiac cath - MV CAD 9/20/17 Dr. Jose Guadalupe Valadez [Z98.890] 09/20/2017     Priority: High    Tobacco use [Z72.0] 11/20/2017    Non-compliance [Z91.19] 09/21/2017    Essential hypertension [I10]     HLD (hyperlipidemia) [E78.5] 11/07/2015    Chest pain [R07.9] 09/02/2014    CAD (coronary artery disease) [I25.10] 09/02/2014    GERD (gastroesophageal reflux disease) [K21.9] 11/20/2012     Past Medical History:   Diagnosis Date    Arthritis     CAD (coronary artery disease) 2006    heart attack x2    Carotid artery stenosis     Chronic back pain     [facility] [address]     COPD (chronic obstructive pulmonary disease) ([facility] [address]     Disease of blood and blood forming organ     Drug abuse 9/2/2014    GERD (gastroesophageal reflux disease) 11/20/2012    H/O blood clots     Head injury     Heart attack     Hyperlipidemia     Hypertension     Prediabetes     Stomach ulcer         Plan:  Discharge planning  Will monitor and control Blood Pressure  Will monitor and control glucose   Reflux precautions  Respiratory Therapy and Bronchodilators prn  Pain control  Ongoing risk factor management  Avoid cigarette smoking  The patient will be discharged as per her request  She is aware of the risk, including MI and death    IP CONSULT TO HOSPITALIST  IP CONSULT TO CARDIOLOGY  IP CONSULT TO [address] [place] [address]    Electronically signed by Viet Bailey DO on 11/20/2017 at 1:27 PM

## 2017-11-20 NOTE — CARE COORDINATION
Discharge [facility] Case Management Department  Written by: Shweta Tavarez RN    Patient Name: Ned Mcleod  Attending Provider: No att. providers found  Admit Date: 2017  6:54 PM  MRN: [id number]  Account: [de-identified]                     : 1955  Discharge Date: 2017      Disposition: home independently    Shweta Tavarez RN

## 2017-11-21 NOTE — DISCHARGE SUMMARY
[facility]  Discharge Summary     Patient ID: Glenn Copeland  :  1955   MRN: [id number]     ACCOUNT:  [de-identified]   Patient's PCP: Princess Lemon MD  Admit Date: 2017   Discharge Date: 2017    Discharge Physician: Rachna Bagley DO     Active Discharge Diagnoses:     Primary Problem  CAD (coronary artery disease)      [place] Problems    Diagnosis Date Noted    S/P cardiac cath - MV CAD 17 Dr. Heladio Palumbo [Z98.890] 2017     Priority: High    Tobacco use [Z72.0] 2017    Non-compliance [Z91.19] 2017    Essential hypertension [I10]     HLD (hyperlipidemia) [E78.5] 2015    Chest pain [R07.9] 2014    CAD (coronary artery disease) [I25.10] 2014    GERD (gastroesophageal reflux disease) [K21.9] 2012         Hospital Stay:     Hospital Course:   Admitted with:  Chief Complaint   Patient presents with    Chest Pain       Pt reports midsternal chest pain x1 hour, took 3 Nitro at home and given 1 Nitro per EMS    Shortness of Breath    Nausea         History Obtained From:      patient, electronic medical record     History of Present Illness:      59 yo f with h/o CAD, 3 stents placed in the past, CHF, HLD, COPD presented with chest pain while at rest. Patient states chest pains started while watching TV. Describes as pressure, constant. A/w sob and diaphoresis. Patient had ACS work up in September of the year and had cardiac cath. Patient was found to have multi-vessel disease with LV aneurysm. Patient was recommended to have CABG and LVA resection, but patient refused. Patient continue to refuse surgery and is asking for second opinion.        Initial assessment and plan:  Primary Problem  CAD (coronary artery disease)           Active Hospital Problems     Diagnosis Date Noted    S/P cardiac cath - MV CAD 17 Dr. Heladio Palumbo [Z98.890] 2017       Priority: High    Chest pain [R07.9] 2014    CAD (coronary artery disease) [I25.10] 09/02/2014         Plan:      Patient status Admit as inpatient in the  Med/Surge     1. Multivessel CAD   2. Pt continues to refused CABG. Wants second opinion- pt adamant. States that even if Dr. Opal Fischer recommends CABG, she will not surgery   3. Cardio consult noted- add ranexa  4. Possible stents tomorrow. Significant Diagnostic Studies:   Labs / Micro:   Hematology:  Recent Labs      11/18/17 1905 11/19/17   0657   WBC  8.9  5.9   HGB  13.7  12.8   HCT  42.2  40.2   PLT  See Reflexed IPF Result  227   INR   --   1.0     Chemistry:  Recent Labs      11/18/17 1905 11/19/17   0657   NA  135  141   K  4.1  4.2   CL  100  106   CO2  20  21   GLUCOSE  121*  87   BUN  22  20   CREATININE  0.72  0.61   CALCIUM  8.9  8.6     Recent Labs      11/18/17 1904 11/18/17 2127 11/19/17   0657   PROT   --    --   6.0*   LABALBU   --    --   3.6   AST   --    --   14   ALT   --    --   11   ALKPHOS   --    --   92   BILITOT   --    --   0.83   CHOL   --    --   207*   TRIG   --    --   136   HDL   --    --   42   TROPONINI  0.01  0.01   --          Radiology:    Xr Chest Portable    Result Date: 11/18/2017  EXAMINATION: SINGLE VIEW OF THE CHEST 11/18/2017 7:22 pm COMPARISON: 09/15/2017. HISTORY: ORDERING SYSTEM PROVIDED HISTORY: chest pain TECHNOLOGIST PROVIDED HISTORY: Reason for exam:->chest pain FINDINGS: The cardiac silhouette is stable. Aortic vascular calcification with mild ectasia. The lungs are clear. No infiltrate, pleural fluid or overt failure. No acute osseous findings. No acute cardiopulmonary disease. Ct Chest Pulmonary Embolism W Contrast    Result Date: 11/18/2017  EXAMINATION: CTA OF THE CHEST 11/18/2017 10:08 pm TECHNIQUE: CTA of the chest was performed after the administration of intravenous contrast.  Multiplanar reformatted images are provided for review. MIP images are provided for review.  Dose modulation, iterative reconstruction, and/or weight based adjustment of the mA/kV was utilized to reduce the radiation dose to as low as reasonably achievable. COMPARISON: 09/01/2014 HISTORY: ORDERING SYSTEM PROVIDED HISTORY: sob chest pain, elevated ddimer FINDINGS: Pulmonary Arteries: Pulmonary arteries are adequately opacified for evaluation. No evidence of intraluminal filling defect to suggest pulmonary embolism. Main pulmonary artery is normal in caliber. Mediastinum: No evidence of mediastinal lymphadenopathy. The heart and pericardium demonstrate no acute abnormality. The heart is enlarged. There is coronary artery disease There is no acute abnormality of the thoracic aorta. Ascending aorta is measured at 4.1 cm in diameter, minimally dilated, descending is normal at 3.0 cm. Lungs/pleura: The lungs are without acute process. No focal consolidation or pulmonary edema. No evidence of pleural effusion or pneumothorax. Upper Abdomen: Limited images of the upper abdomen demonstrate no acute findings. Coarse right renal parenchymal calcification again noted. Soft Tissues/Bones: No acute bone or soft tissue abnormality. No evidence of pulmonary embolism or acute pulmonary abnormality. Consultations:    Consults:     Final Specialist Recommendations/Findings:   IP CONSULT TO HOSPITALIST  IP CONSULT TO CARDIOLOGY  IP CONSULT TO Danika Cohen      The patient was seen and examined on day of discharge and this discharge summary is in conjunction with any daily progress note from day of discharge.     Discharge plan:   Discharge planning:  The patient responded to medical treatment  They were discharged in improved condition  The patient is refusing further cardiac evaluation  She does not want to consider cardiac cath and stents  She does not want to consider coronary bypass grafting at this time  She declined further cardiac consultation  She declined cardiothoracic surgery reevaluation  The patient wants to go home and start

## 2018-01-01 ENCOUNTER — APPOINTMENT (OUTPATIENT)
Dept: CARDIAC CATH/INVASIVE PROCEDURES | Age: 63
DRG: 227 | End: 2018-01-01
Payer: MEDICARE

## 2018-01-01 ENCOUNTER — HOSPITAL ENCOUNTER (INPATIENT)
Age: 63
LOS: 5 days | Discharge: HOME | DRG: 227 | End: 2018-11-02
Attending: EMERGENCY MEDICINE | Admitting: FAMILY MEDICINE
Payer: MEDICARE

## 2018-01-01 ENCOUNTER — HOSPITAL ENCOUNTER (OUTPATIENT)
Dept: OTHER | Age: 63
Discharge: HOME | End: 2018-11-19
Payer: MEDICARE

## 2018-01-01 ENCOUNTER — APPOINTMENT (OUTPATIENT)
Dept: GENERAL RADIOLOGY | Age: 63
DRG: 227 | End: 2018-01-01
Payer: MEDICARE

## 2018-01-01 ENCOUNTER — APPOINTMENT (OUTPATIENT)
Dept: NUCLEAR MEDICINE | Age: 63
DRG: 227 | End: 2018-01-01
Payer: MEDICARE

## 2018-01-01 ENCOUNTER — TELEPHONE (OUTPATIENT)
Dept: ADMINISTRATIVE | Age: 63
End: 2018-01-01

## 2018-01-01 ENCOUNTER — OFFICE VISIT (OUTPATIENT)
Dept: FAMILY MEDICINE CLINIC | Age: 63
End: 2018-01-01
Payer: MEDICARE

## 2018-01-01 ENCOUNTER — TELEPHONE (OUTPATIENT)
Dept: PHARMACY | Age: 63
End: 2018-01-01

## 2018-01-01 VITALS
TEMPERATURE: 97.9 F | WEIGHT: 168 LBS | HEART RATE: 66 BPM | SYSTOLIC BLOOD PRESSURE: 120 MMHG | DIASTOLIC BLOOD PRESSURE: 70 MMHG | BODY MASS INDEX: 31.74 KG/M2

## 2018-01-01 VITALS
BODY MASS INDEX: 30.87 KG/M2 | DIASTOLIC BLOOD PRESSURE: 93 MMHG | WEIGHT: 163.5 LBS | TEMPERATURE: 96.6 F | OXYGEN SATURATION: 93 % | HEART RATE: 80 BPM | RESPIRATION RATE: 18 BRPM | SYSTOLIC BLOOD PRESSURE: 152 MMHG | HEIGHT: 61 IN

## 2018-01-01 VITALS
DIASTOLIC BLOOD PRESSURE: 102 MMHG | RESPIRATION RATE: 16 BRPM | BODY MASS INDEX: 31.33 KG/M2 | SYSTOLIC BLOOD PRESSURE: 160 MMHG | HEART RATE: 97 BPM | OXYGEN SATURATION: 99 % | WEIGHT: 165.8 LBS

## 2018-01-01 DIAGNOSIS — Z12.11 COLON CANCER SCREENING: ICD-10-CM

## 2018-01-01 DIAGNOSIS — I10 ESSENTIAL HYPERTENSION: ICD-10-CM

## 2018-01-01 DIAGNOSIS — I50.9 ACUTE CONGESTIVE HEART FAILURE, UNSPECIFIED HEART FAILURE TYPE (HCC): Primary | ICD-10-CM

## 2018-01-01 DIAGNOSIS — I25.110 CORONARY ARTERY DISEASE INVOLVING NATIVE CORONARY ARTERY OF NATIVE HEART WITH UNSTABLE ANGINA PECTORIS (HCC): ICD-10-CM

## 2018-01-01 DIAGNOSIS — Z13.1 DIABETES MELLITUS SCREENING: ICD-10-CM

## 2018-01-01 DIAGNOSIS — Z11.59 NEED FOR HEPATITIS C SCREENING TEST: ICD-10-CM

## 2018-01-01 DIAGNOSIS — I25.10 CORONARY ARTERY DISEASE INVOLVING NATIVE CORONARY ARTERY OF NATIVE HEART WITHOUT ANGINA PECTORIS: ICD-10-CM

## 2018-01-01 DIAGNOSIS — I50.22 CHRONIC SYSTOLIC CONGESTIVE HEART FAILURE (HCC): ICD-10-CM

## 2018-01-01 DIAGNOSIS — I50.42 CHRONIC COMBINED SYSTOLIC AND DIASTOLIC HEART FAILURE (HCC): Primary | ICD-10-CM

## 2018-01-01 DIAGNOSIS — Z76.0 MEDICATION REFILL: ICD-10-CM

## 2018-01-01 DIAGNOSIS — Z87.891 PERSONAL HISTORY OF TOBACCO USE: ICD-10-CM

## 2018-01-01 DIAGNOSIS — Z12.39 BREAST CANCER SCREENING: ICD-10-CM

## 2018-01-01 DIAGNOSIS — Z11.4 ENCOUNTER FOR SCREENING FOR HIV: ICD-10-CM

## 2018-01-01 DIAGNOSIS — I50.23 ACUTE ON CHRONIC SYSTOLIC (CONGESTIVE) HEART FAILURE (HCC): ICD-10-CM

## 2018-01-01 DIAGNOSIS — I50.9 HEART FAILURE, UNSPECIFIED HF CHRONICITY, UNSPECIFIED HEART FAILURE TYPE (HCC): ICD-10-CM

## 2018-01-01 DIAGNOSIS — Z12.2 ENCOUNTER FOR SCREENING FOR LUNG CANCER: ICD-10-CM

## 2018-01-01 LAB
ABSOLUTE EOS #: 0.44 K/UL (ref 0–0.44)
ABSOLUTE IMMATURE GRANULOCYTE: 0.03 K/UL (ref 0–0.3)
ABSOLUTE LYMPH #: 3.07 K/UL (ref 1.1–3.7)
ABSOLUTE MONO #: 0.78 K/UL (ref 0.1–1.2)
ANION GAP SERPL CALCULATED.3IONS-SCNC: 13 MMOL/L (ref 9–17)
ANION GAP SERPL CALCULATED.3IONS-SCNC: 13 MMOL/L (ref 9–17)
ANION GAP SERPL CALCULATED.3IONS-SCNC: 14 MMOL/L (ref 9–17)
ANION GAP SERPL CALCULATED.3IONS-SCNC: 16 MMOL/L (ref 9–17)
ANION GAP SERPL CALCULATED.3IONS-SCNC: 16 MMOL/L (ref 9–17)
ANION GAP SERPL CALCULATED.3IONS-SCNC: 19 MMOL/L (ref 9–17)
BASOPHILS # BLD: 1 % (ref 0–2)
BASOPHILS ABSOLUTE: 0.05 K/UL (ref 0–0.2)
BNP INTERPRETATION: ABNORMAL
BUN BLDV-MCNC: 15 MG/DL (ref 8–23)
BUN BLDV-MCNC: 17 MG/DL (ref 8–23)
BUN BLDV-MCNC: 27 MG/DL (ref 8–23)
BUN BLDV-MCNC: 32 MG/DL (ref 8–23)
BUN BLDV-MCNC: 38 MG/DL (ref 8–23)
BUN BLDV-MCNC: 39 MG/DL (ref 8–23)
BUN/CREAT BLD: ABNORMAL (ref 9–20)
CALCIUM SERPL-MCNC: 8.9 MG/DL (ref 8.6–10.4)
CALCIUM SERPL-MCNC: 9.2 MG/DL (ref 8.6–10.4)
CALCIUM SERPL-MCNC: 9.2 MG/DL (ref 8.6–10.4)
CALCIUM SERPL-MCNC: 9.3 MG/DL (ref 8.6–10.4)
CHLORIDE BLD-SCNC: 101 MMOL/L (ref 98–107)
CHLORIDE BLD-SCNC: 101 MMOL/L (ref 98–107)
CHLORIDE BLD-SCNC: 102 MMOL/L (ref 98–107)
CHLORIDE BLD-SCNC: 103 MMOL/L (ref 98–107)
CHLORIDE BLD-SCNC: 96 MMOL/L (ref 98–107)
CHLORIDE BLD-SCNC: 99 MMOL/L (ref 98–107)
CO2: 21 MMOL/L (ref 20–31)
CO2: 21 MMOL/L (ref 20–31)
CO2: 22 MMOL/L (ref 20–31)
CO2: 23 MMOL/L (ref 20–31)
CO2: 24 MMOL/L (ref 20–31)
CO2: 25 MMOL/L (ref 20–31)
CREAT SERPL-MCNC: 0.57 MG/DL (ref 0.5–0.9)
CREAT SERPL-MCNC: 0.61 MG/DL (ref 0.5–0.9)
CREAT SERPL-MCNC: 0.69 MG/DL (ref 0.5–0.9)
CREAT SERPL-MCNC: 0.84 MG/DL (ref 0.5–0.9)
CREAT SERPL-MCNC: 0.88 MG/DL (ref 0.5–0.9)
CREAT SERPL-MCNC: 0.93 MG/DL (ref 0.5–0.9)
DIFFERENTIAL TYPE: ABNORMAL
EKG ATRIAL RATE: 82 BPM
EKG ATRIAL RATE: 95 BPM
EKG P AXIS: 37 DEGREES
EKG P AXIS: 54 DEGREES
EKG P-R INTERVAL: 132 MS
EKG P-R INTERVAL: 136 MS
EKG Q-T INTERVAL: 404 MS
EKG Q-T INTERVAL: 460 MS
EKG QRS DURATION: 132 MS
EKG QRS DURATION: 136 MS
EKG QTC CALCULATION (BAZETT): 507 MS
EKG QTC CALCULATION (BAZETT): 537 MS
EKG R AXIS: -22 DEGREES
EKG R AXIS: -27 DEGREES
EKG T AXIS: 60 DEGREES
EKG T AXIS: 69 DEGREES
EKG VENTRICULAR RATE: 82 BPM
EKG VENTRICULAR RATE: 95 BPM
EOSINOPHILS RELATIVE PERCENT: 5 % (ref 1–4)
GFR AFRICAN AMERICAN: >60 ML/MIN
GFR NON-AFRICAN AMERICAN: >60 ML/MIN
GFR SERPL CREATININE-BSD FRML MDRD: ABNORMAL ML/MIN/{1.73_M2}
GLUCOSE BLD-MCNC: 103 MG/DL (ref 70–99)
GLUCOSE BLD-MCNC: 105 MG/DL (ref 70–99)
GLUCOSE BLD-MCNC: 106 MG/DL (ref 70–99)
GLUCOSE BLD-MCNC: 116 MG/DL (ref 70–99)
GLUCOSE BLD-MCNC: 132 MG/DL (ref 70–99)
GLUCOSE BLD-MCNC: 94 MG/DL (ref 70–99)
HBA1C MFR BLD: 6 %
HCT VFR BLD CALC: 42 % (ref 36.3–47.1)
HCT VFR BLD CALC: 42.4 % (ref 36.3–47.1)
HCT VFR BLD CALC: 44.3 % (ref 36.3–47.1)
HCT VFR BLD CALC: 45.2 % (ref 36.3–47.1)
HCT VFR BLD CALC: 45.3 % (ref 36.3–47.1)
HCT VFR BLD CALC: 45.6 % (ref 36.3–47.1)
HEMOGLOBIN: 13.4 G/DL (ref 11.9–15.1)
HEMOGLOBIN: 13.8 G/DL (ref 11.9–15.1)
HEMOGLOBIN: 14.2 G/DL (ref 11.9–15.1)
HEMOGLOBIN: 14.3 G/DL (ref 11.9–15.1)
HEMOGLOBIN: 14.5 G/DL (ref 11.9–15.1)
HEMOGLOBIN: 14.9 G/DL (ref 11.9–15.1)
IMMATURE GRANULOCYTES: 0 %
LV EF: 29 %
LV EF: 33 %
LVEF MODALITY: NORMAL
LVEF MODALITY: NORMAL
LYMPHOCYTES # BLD: 33 % (ref 24–43)
MAGNESIUM: 2.2 MG/DL (ref 1.6–2.6)
MCH RBC QN AUTO: 29.5 PG (ref 25.2–33.5)
MCH RBC QN AUTO: 29.7 PG (ref 25.2–33.5)
MCH RBC QN AUTO: 29.8 PG (ref 25.2–33.5)
MCH RBC QN AUTO: 30 PG (ref 25.2–33.5)
MCH RBC QN AUTO: 30.1 PG (ref 25.2–33.5)
MCH RBC QN AUTO: 30.3 PG (ref 25.2–33.5)
MCHC RBC AUTO-ENTMCNC: 31.4 G/DL (ref 28.4–34.8)
MCHC RBC AUTO-ENTMCNC: 31.9 G/DL (ref 28.4–34.8)
MCHC RBC AUTO-ENTMCNC: 32.1 G/DL (ref 28.4–34.8)
MCHC RBC AUTO-ENTMCNC: 32.1 G/DL (ref 28.4–34.8)
MCHC RBC AUTO-ENTMCNC: 32.5 G/DL (ref 28.4–34.8)
MCHC RBC AUTO-ENTMCNC: 32.9 G/DL (ref 28.4–34.8)
MCV RBC AUTO: 91.5 FL (ref 82.6–102.9)
MCV RBC AUTO: 92.1 FL (ref 82.6–102.9)
MCV RBC AUTO: 92.9 FL (ref 82.6–102.9)
MCV RBC AUTO: 93.2 FL (ref 82.6–102.9)
MCV RBC AUTO: 94 FL (ref 82.6–102.9)
MCV RBC AUTO: 94.8 FL (ref 82.6–102.9)
MONOCYTES # BLD: 8 % (ref 3–12)
NRBC AUTOMATED: 0 PER 100 WBC
PDW BLD-RTO: 13.9 % (ref 11.8–14.4)
PDW BLD-RTO: 14.2 % (ref 11.8–14.4)
PDW BLD-RTO: 14.3 % (ref 11.8–14.4)
PDW BLD-RTO: 14.4 % (ref 11.8–14.4)
PHOSPHORUS: 3.7 MG/DL (ref 2.6–4.5)
PLATELET # BLD: 206 K/UL (ref 138–453)
PLATELET # BLD: 218 K/UL (ref 138–453)
PLATELET # BLD: 225 K/UL (ref 138–453)
PLATELET # BLD: 228 K/UL (ref 138–453)
PLATELET # BLD: 235 K/UL (ref 138–453)
PLATELET # BLD: 241 K/UL (ref 138–453)
PLATELET ESTIMATE: ABNORMAL
PMV BLD AUTO: 10.2 FL (ref 8.1–13.5)
PMV BLD AUTO: 10.3 FL (ref 8.1–13.5)
PMV BLD AUTO: 10.4 FL (ref 8.1–13.5)
PMV BLD AUTO: 10.4 FL (ref 8.1–13.5)
PMV BLD AUTO: 10.5 FL (ref 8.1–13.5)
PMV BLD AUTO: 10.7 FL (ref 8.1–13.5)
POC TROPONIN I: 0.02 NG/ML (ref 0–0.1)
POC TROPONIN I: 0.02 NG/ML (ref 0–0.1)
POC TROPONIN INTERP: NORMAL
POC TROPONIN INTERP: NORMAL
POTASSIUM SERPL-SCNC: 3.8 MMOL/L (ref 3.7–5.3)
POTASSIUM SERPL-SCNC: 4.3 MMOL/L (ref 3.7–5.3)
POTASSIUM SERPL-SCNC: 4.4 MMOL/L (ref 3.7–5.3)
POTASSIUM SERPL-SCNC: 4.4 MMOL/L (ref 3.7–5.3)
POTASSIUM SERPL-SCNC: 4.6 MMOL/L (ref 3.7–5.3)
POTASSIUM SERPL-SCNC: 4.6 MMOL/L (ref 3.7–5.3)
PRO-BNP: 2088 PG/ML
RBC # BLD: 4.47 M/UL (ref 3.95–5.11)
RBC # BLD: 4.55 M/UL (ref 3.95–5.11)
RBC # BLD: 4.77 M/UL (ref 3.95–5.11)
RBC # BLD: 4.81 M/UL (ref 3.95–5.11)
RBC # BLD: 4.91 M/UL (ref 3.95–5.11)
RBC # BLD: 4.95 M/UL (ref 3.95–5.11)
RBC # BLD: ABNORMAL 10*6/UL
SEG NEUTROPHILS: 53 % (ref 36–65)
SEGMENTED NEUTROPHILS ABSOLUTE COUNT: 4.96 K/UL (ref 1.5–8.1)
SODIUM BLD-SCNC: 136 MMOL/L (ref 135–144)
SODIUM BLD-SCNC: 137 MMOL/L (ref 135–144)
SODIUM BLD-SCNC: 137 MMOL/L (ref 135–144)
SODIUM BLD-SCNC: 139 MMOL/L (ref 135–144)
SODIUM BLD-SCNC: 140 MMOL/L (ref 135–144)
SODIUM BLD-SCNC: 140 MMOL/L (ref 135–144)
TSH SERPL DL<=0.05 MIU/L-ACNC: 0.87 MIU/L (ref 0.3–5)
WBC # BLD: 6.7 K/UL (ref 3.5–11.3)
WBC # BLD: 8.2 K/UL (ref 3.5–11.3)
WBC # BLD: 8.5 K/UL (ref 3.5–11.3)
WBC # BLD: 8.6 K/UL (ref 3.5–11.3)
WBC # BLD: 9.3 K/UL (ref 3.5–11.3)
WBC # BLD: 9.3 K/UL (ref 3.5–11.3)
WBC # BLD: ABNORMAL 10*3/UL

## 2018-01-01 PROCEDURE — 6370000000 HC RX 637 (ALT 250 FOR IP): Performed by: HOSPITALIST

## 2018-01-01 PROCEDURE — 6370000000 HC RX 637 (ALT 250 FOR IP): Performed by: NURSE PRACTITIONER

## 2018-01-01 PROCEDURE — 99233 SBSQ HOSP IP/OBS HIGH 50: CPT | Performed by: FAMILY MEDICINE

## 2018-01-01 PROCEDURE — 2700000000 HC OXYGEN THERAPY PER DAY

## 2018-01-01 PROCEDURE — 36415 COLL VENOUS BLD VENIPUNCTURE: CPT

## 2018-01-01 PROCEDURE — 80048 BASIC METABOLIC PNL TOTAL CA: CPT

## 2018-01-01 PROCEDURE — 6370000000 HC RX 637 (ALT 250 FOR IP): Performed by: INTERNAL MEDICINE

## 2018-01-01 PROCEDURE — 33249 INSJ/RPLCMT DEFIB W/LEAD(S): CPT

## 2018-01-01 PROCEDURE — 71046 X-RAY EXAM CHEST 2 VIEWS: CPT

## 2018-01-01 PROCEDURE — 83880 ASSAY OF NATRIURETIC PEPTIDE: CPT

## 2018-01-01 PROCEDURE — 2060000000 HC ICU INTERMEDIATE R&B

## 2018-01-01 PROCEDURE — 85027 COMPLETE CBC AUTOMATED: CPT

## 2018-01-01 PROCEDURE — 93005 ELECTROCARDIOGRAM TRACING: CPT

## 2018-01-01 PROCEDURE — 6360000002 HC RX W HCPCS: Performed by: INTERNAL MEDICINE

## 2018-01-01 PROCEDURE — 6360000002 HC RX W HCPCS: Performed by: HOSPITALIST

## 2018-01-01 PROCEDURE — 99211 OFF/OP EST MAY X REQ PHY/QHP: CPT

## 2018-01-01 PROCEDURE — 99239 HOSP IP/OBS DSCHRG MGMT >30: CPT | Performed by: FAMILY MEDICINE

## 2018-01-01 PROCEDURE — 84484 ASSAY OF TROPONIN QUANT: CPT

## 2018-01-01 PROCEDURE — 6370000000 HC RX 637 (ALT 250 FOR IP): Performed by: STUDENT IN AN ORGANIZED HEALTH CARE EDUCATION/TRAINING PROGRAM

## 2018-01-01 PROCEDURE — 0JH608Z INSERTION OF DEFIBRILLATOR GENERATOR INTO CHEST SUBCUTANEOUS TISSUE AND FASCIA, OPEN APPROACH: ICD-10-PCS | Performed by: INTERNAL MEDICINE

## 2018-01-01 PROCEDURE — 6360000002 HC RX W HCPCS: Performed by: NURSE PRACTITIONER

## 2018-01-01 PROCEDURE — 2709999900 HC NON-CHARGEABLE SUPPLY

## 2018-01-01 PROCEDURE — 84443 ASSAY THYROID STIM HORMONE: CPT

## 2018-01-01 PROCEDURE — 85025 COMPLETE CBC W/AUTO DIFF WBC: CPT

## 2018-01-01 PROCEDURE — 2580000003 HC RX 258: Performed by: NURSE PRACTITIONER

## 2018-01-01 PROCEDURE — C1777 LEAD, AICD, ENDO SINGLE COIL: HCPCS

## 2018-01-01 PROCEDURE — 6360000002 HC RX W HCPCS

## 2018-01-01 PROCEDURE — 78472 GATED HEART PLANAR SINGLE: CPT

## 2018-01-01 PROCEDURE — 94760 N-INVAS EAR/PLS OXIMETRY 1: CPT

## 2018-01-01 PROCEDURE — 2580000003 HC RX 258: Performed by: INTERNAL MEDICINE

## 2018-01-01 PROCEDURE — 76937 US GUIDE VASCULAR ACCESS: CPT

## 2018-01-01 PROCEDURE — 2500000003 HC RX 250 WO HCPCS: Performed by: INTERNAL MEDICINE

## 2018-01-01 PROCEDURE — 99223 1ST HOSP IP/OBS HIGH 75: CPT | Performed by: FAMILY MEDICINE

## 2018-01-01 PROCEDURE — 02HK3KZ INSERTION OF DEFIBRILLATOR LEAD INTO RIGHT VENTRICLE, PERCUTANEOUS APPROACH: ICD-10-PCS | Performed by: INTERNAL MEDICINE

## 2018-01-01 PROCEDURE — 94640 AIRWAY INHALATION TREATMENT: CPT

## 2018-01-01 PROCEDURE — 96374 THER/PROPH/DIAG INJ IV PUSH: CPT

## 2018-01-01 PROCEDURE — 84100 ASSAY OF PHOSPHORUS: CPT

## 2018-01-01 PROCEDURE — 2580000003 HC RX 258

## 2018-01-01 PROCEDURE — C8929 TTE W OR WO FOL WCON,DOPPLER: HCPCS

## 2018-01-01 PROCEDURE — 83036 HEMOGLOBIN GLYCOSYLATED A1C: CPT | Performed by: FAMILY MEDICINE

## 2018-01-01 PROCEDURE — 99213 OFFICE O/P EST LOW 20 MIN: CPT | Performed by: FAMILY MEDICINE

## 2018-01-01 PROCEDURE — 83735 ASSAY OF MAGNESIUM: CPT

## 2018-01-01 PROCEDURE — 3430000000 HC RX DIAGNOSTIC RADIOPHARMACEUTICAL: Performed by: NURSE PRACTITIONER

## 2018-01-01 PROCEDURE — 2500000003 HC RX 250 WO HCPCS

## 2018-01-01 PROCEDURE — G0296 VISIT TO DETERM LDCT ELIG: HCPCS | Performed by: FAMILY MEDICINE

## 2018-01-01 PROCEDURE — 99285 EMERGENCY DEPT VISIT HI MDM: CPT

## 2018-01-01 PROCEDURE — C1894 INTRO/SHEATH, NON-LASER: HCPCS

## 2018-01-01 PROCEDURE — 2500000003 HC RX 250 WO HCPCS: Performed by: STUDENT IN AN ORGANIZED HEALTH CARE EDUCATION/TRAINING PROGRAM

## 2018-01-01 PROCEDURE — 71045 X-RAY EXAM CHEST 1 VIEW: CPT

## 2018-01-01 PROCEDURE — A9560 TC99M LABELED RBC: HCPCS | Performed by: NURSE PRACTITIONER

## 2018-01-01 PROCEDURE — 6360000002 HC RX W HCPCS: Performed by: STUDENT IN AN ORGANIZED HEALTH CARE EDUCATION/TRAINING PROGRAM

## 2018-01-01 PROCEDURE — C1722 AICD, SINGLE CHAMBER: HCPCS

## 2018-01-01 RX ORDER — OXYCODONE HYDROCHLORIDE 5 MG/1
5 TABLET ORAL EVERY 4 HOURS PRN
Status: DISCONTINUED | OUTPATIENT
Start: 2018-01-01 | End: 2018-01-01

## 2018-01-01 RX ORDER — FLUTICASONE PROPIONATE 50 MCG
1 SPRAY, SUSPENSION (ML) NASAL DAILY
Status: DISCONTINUED | OUTPATIENT
Start: 2018-01-01 | End: 2018-01-01 | Stop reason: HOSPADM

## 2018-01-01 RX ORDER — ATORVASTATIN CALCIUM 80 MG/1
80 TABLET, FILM COATED ORAL NIGHTLY
Status: DISCONTINUED | OUTPATIENT
Start: 2018-01-01 | End: 2018-01-01 | Stop reason: HOSPADM

## 2018-01-01 RX ORDER — ASPIRIN 81 MG/1
81 TABLET, CHEWABLE ORAL DAILY
Status: DISCONTINUED | OUTPATIENT
Start: 2018-01-01 | End: 2018-01-01 | Stop reason: HOSPADM

## 2018-01-01 RX ORDER — CYCLOBENZAPRINE HCL 10 MG
10 TABLET ORAL 3 TIMES DAILY PRN
COMMUNITY
End: 2018-01-01 | Stop reason: SDUPTHER

## 2018-01-01 RX ORDER — OXYCODONE HYDROCHLORIDE 5 MG/1
10 TABLET ORAL EVERY 4 HOURS PRN
Status: DISCONTINUED | OUTPATIENT
Start: 2018-01-01 | End: 2018-01-01

## 2018-01-01 RX ORDER — SODIUM CHLORIDE 9 MG/ML
INJECTION, SOLUTION INTRAVENOUS CONTINUOUS
Status: DISCONTINUED | OUTPATIENT
Start: 2018-01-01 | End: 2018-01-01 | Stop reason: HOSPADM

## 2018-01-01 RX ORDER — LABETALOL 100 MG/1
50 TABLET, FILM COATED ORAL EVERY 12 HOURS SCHEDULED
Status: DISCONTINUED | OUTPATIENT
Start: 2018-01-01 | End: 2018-01-01 | Stop reason: HOSPADM

## 2018-01-01 RX ORDER — ALBUTEROL SULFATE 90 UG/1
2 AEROSOL, METERED RESPIRATORY (INHALATION)
Status: DISCONTINUED | OUTPATIENT
Start: 2018-01-01 | End: 2018-01-01

## 2018-01-01 RX ORDER — SPIRONOLACTONE 25 MG/1
25 TABLET ORAL DAILY
Qty: 30 TABLET | Refills: 3 | Status: SHIPPED | OUTPATIENT
Start: 2018-01-01 | End: 2018-01-01

## 2018-01-01 RX ORDER — SODIUM CHLORIDE 0.9 % (FLUSH) 0.9 %
10 SYRINGE (ML) INJECTION EVERY 12 HOURS SCHEDULED
Status: DISCONTINUED | OUTPATIENT
Start: 2018-01-01 | End: 2018-01-01 | Stop reason: HOSPADM

## 2018-01-01 RX ORDER — SPIRONOLACTONE 25 MG/1
25 TABLET ORAL DAILY
Status: DISCONTINUED | OUTPATIENT
Start: 2018-01-01 | End: 2018-01-01 | Stop reason: HOSPADM

## 2018-01-01 RX ORDER — PANTOPRAZOLE SODIUM 40 MG/1
40 TABLET, DELAYED RELEASE ORAL
Status: DISCONTINUED | OUTPATIENT
Start: 2018-01-01 | End: 2018-01-01

## 2018-01-01 RX ORDER — NITROGLYCERIN 20 MG/100ML
5 INJECTION INTRAVENOUS CONTINUOUS
Status: DISCONTINUED | OUTPATIENT
Start: 2018-01-01 | End: 2018-01-01

## 2018-01-01 RX ORDER — CLOPIDOGREL BISULFATE 75 MG/1
TABLET ORAL
Qty: 30 TABLET | Refills: 5 | Status: SHIPPED | OUTPATIENT
Start: 2018-01-01

## 2018-01-01 RX ORDER — CYCLOBENZAPRINE HCL 10 MG
10 TABLET ORAL 3 TIMES DAILY PRN
Qty: 60 TABLET | Refills: 2 | Status: SHIPPED | OUTPATIENT
Start: 2018-01-01

## 2018-01-01 RX ORDER — SODIUM CHLORIDE 0.9 % (FLUSH) 0.9 %
10 SYRINGE (ML) INJECTION PRN
Status: DISCONTINUED | OUTPATIENT
Start: 2018-01-01 | End: 2018-01-01 | Stop reason: HOSPADM

## 2018-01-01 RX ORDER — LISINOPRIL 2.5 MG/1
TABLET ORAL
Qty: 90 TABLET | Refills: 0 | Status: ON HOLD | OUTPATIENT
Start: 2018-01-01 | End: 2019-01-01 | Stop reason: HOSPADM

## 2018-01-01 RX ORDER — IPRATROPIUM BROMIDE AND ALBUTEROL SULFATE 2.5; .5 MG/3ML; MG/3ML
1 SOLUTION RESPIRATORY (INHALATION)
Status: DISCONTINUED | OUTPATIENT
Start: 2018-01-01 | End: 2018-01-01

## 2018-01-01 RX ORDER — CETIRIZINE HYDROCHLORIDE 10 MG/1
10 TABLET ORAL DAILY
Qty: 30 TABLET | Refills: 5 | Status: SHIPPED | OUTPATIENT
Start: 2018-01-01 | End: 2018-01-01 | Stop reason: SDUPTHER

## 2018-01-01 RX ORDER — SODIUM CHLORIDE 0.9 % (FLUSH) 0.9 %
10 SYRINGE (ML) INJECTION 2 TIMES DAILY
Status: DISCONTINUED | OUTPATIENT
Start: 2018-01-01 | End: 2018-01-01 | Stop reason: HOSPADM

## 2018-01-01 RX ORDER — BUMETANIDE 1 MG/1
0.5 TABLET ORAL DAILY
Status: DISCONTINUED | OUTPATIENT
Start: 2018-01-01 | End: 2018-01-01 | Stop reason: HOSPADM

## 2018-01-01 RX ORDER — ACETAMINOPHEN 325 MG/1
650 TABLET ORAL EVERY 4 HOURS PRN
Status: DISCONTINUED | OUTPATIENT
Start: 2018-01-01 | End: 2018-01-01 | Stop reason: HOSPADM

## 2018-01-01 RX ORDER — HYDROCHLOROTHIAZIDE 25 MG/1
25 TABLET ORAL ONCE
Status: COMPLETED | OUTPATIENT
Start: 2018-01-01 | End: 2018-01-01

## 2018-01-01 RX ORDER — CEPHALEXIN 500 MG/1
500 CAPSULE ORAL EVERY 8 HOURS SCHEDULED
Status: DISCONTINUED | OUTPATIENT
Start: 2018-01-01 | End: 2018-01-01 | Stop reason: HOSPADM

## 2018-01-01 RX ORDER — ALBUTEROL SULFATE 2.5 MG/3ML
5 SOLUTION RESPIRATORY (INHALATION)
Status: DISCONTINUED | OUTPATIENT
Start: 2018-01-01 | End: 2018-01-01

## 2018-01-01 RX ORDER — IBUPROFEN 400 MG/1
400 TABLET ORAL EVERY 6 HOURS PRN
Status: DISCONTINUED | OUTPATIENT
Start: 2018-01-01 | End: 2018-01-01

## 2018-01-01 RX ORDER — CETIRIZINE HYDROCHLORIDE 10 MG/1
10 TABLET ORAL DAILY
Qty: 30 TABLET | Refills: 5 | Status: ON HOLD | OUTPATIENT
Start: 2018-01-01 | End: 2019-01-01

## 2018-01-01 RX ORDER — LISINOPRIL 2.5 MG/1
2.5 TABLET ORAL DAILY
Status: DISCONTINUED | OUTPATIENT
Start: 2018-01-01 | End: 2018-01-01 | Stop reason: HOSPADM

## 2018-01-01 RX ORDER — ALBUTEROL SULFATE 90 UG/1
2 AEROSOL, METERED RESPIRATORY (INHALATION) EVERY 6 HOURS PRN
Status: DISCONTINUED | OUTPATIENT
Start: 2018-01-01 | End: 2018-01-01

## 2018-01-01 RX ORDER — BUMETANIDE 1 MG/1
1 TABLET ORAL DAILY
Status: DISCONTINUED | OUTPATIENT
Start: 2018-01-01 | End: 2018-01-01

## 2018-01-01 RX ORDER — IPRATROPIUM BROMIDE AND ALBUTEROL SULFATE 2.5; .5 MG/3ML; MG/3ML
1 SOLUTION RESPIRATORY (INHALATION) EVERY 6 HOURS PRN
Status: DISCONTINUED | OUTPATIENT
Start: 2018-01-01 | End: 2018-01-01 | Stop reason: HOSPADM

## 2018-01-01 RX ORDER — CLOPIDOGREL BISULFATE 75 MG/1
TABLET ORAL
Qty: 30 TABLET | Refills: 5 | Status: SHIPPED | OUTPATIENT
Start: 2018-01-01 | End: 2018-01-01 | Stop reason: SDUPTHER

## 2018-01-01 RX ORDER — CLOPIDOGREL BISULFATE 75 MG/1
75 TABLET ORAL DAILY
Status: DISCONTINUED | OUTPATIENT
Start: 2018-01-01 | End: 2018-01-01 | Stop reason: HOSPADM

## 2018-01-01 RX ORDER — LABETALOL 100 MG/1
50 TABLET, FILM COATED ORAL EVERY 12 HOURS SCHEDULED
Qty: 60 TABLET | Refills: 3 | Status: SHIPPED | OUTPATIENT
Start: 2018-01-01 | End: 2018-01-01

## 2018-01-01 RX ORDER — BUMETANIDE 0.5 MG/1
0.5 TABLET ORAL DAILY
Qty: 30 TABLET | Refills: 3 | Status: SHIPPED | OUTPATIENT
Start: 2018-01-01 | End: 2018-01-01

## 2018-01-01 RX ORDER — CEPHALEXIN 500 MG/1
500 CAPSULE ORAL EVERY 8 HOURS SCHEDULED
Qty: 18 CAPSULE | Refills: 0 | Status: SHIPPED | OUTPATIENT
Start: 2018-01-01 | End: 2018-01-01

## 2018-01-01 RX ORDER — ONDANSETRON 2 MG/ML
4 INJECTION INTRAMUSCULAR; INTRAVENOUS EVERY 6 HOURS PRN
Status: DISCONTINUED | OUTPATIENT
Start: 2018-01-01 | End: 2018-01-01 | Stop reason: HOSPADM

## 2018-01-01 RX ORDER — ASPIRIN 81 MG/1
TABLET, CHEWABLE ORAL
Status: DISCONTINUED
Start: 2018-01-01 | End: 2018-01-01 | Stop reason: HOSPADM

## 2018-01-01 RX ORDER — TRAMADOL HYDROCHLORIDE 50 MG/1
50 TABLET ORAL EVERY 6 HOURS PRN
Status: DISCONTINUED | OUTPATIENT
Start: 2018-01-01 | End: 2018-01-01

## 2018-01-01 RX ORDER — BUMETANIDE 0.25 MG/ML
1 INJECTION, SOLUTION INTRAMUSCULAR; INTRAVENOUS DAILY
Status: DISCONTINUED | OUTPATIENT
Start: 2018-01-01 | End: 2018-01-01

## 2018-01-01 RX ORDER — VANCOMYCIN HYDROCHLORIDE 1 G/200ML
1000 INJECTION, SOLUTION INTRAVENOUS ONCE
Status: COMPLETED | OUTPATIENT
Start: 2018-01-01 | End: 2018-01-01

## 2018-01-01 RX ADMIN — ASPIRIN 81 MG 81 MG: 81 TABLET ORAL at 09:11

## 2018-01-01 RX ADMIN — ASPIRIN 81 MG 81 MG: 81 TABLET ORAL at 15:21

## 2018-01-01 RX ADMIN — Medication 10 ML: at 20:21

## 2018-01-01 RX ADMIN — FLUTICASONE PROPIONATE 1 SPRAY: 50 SPRAY, METERED NASAL at 13:12

## 2018-01-01 RX ADMIN — SODIUM CHLORIDE 50000 UNITS: 900 IRRIGANT IRRIGATION at 16:18

## 2018-01-01 RX ADMIN — SPIRONOLACTONE 25 MG: 25 TABLET ORAL at 09:55

## 2018-01-01 RX ADMIN — CLOPIDOGREL 75 MG: 75 TABLET, FILM COATED ORAL at 16:20

## 2018-01-01 RX ADMIN — ATORVASTATIN CALCIUM 80 MG: 80 TABLET, FILM COATED ORAL at 22:26

## 2018-01-01 RX ADMIN — ASPIRIN 81 MG 81 MG: 81 TABLET ORAL at 08:37

## 2018-01-01 RX ADMIN — Medication 10 ML: at 10:12

## 2018-01-01 RX ADMIN — BUMETANIDE 1 MG: 1 TABLET ORAL at 09:11

## 2018-01-01 RX ADMIN — ASPIRIN 81 MG 81 MG: 81 TABLET ORAL at 09:02

## 2018-01-01 RX ADMIN — ACETAMINOPHEN 650 MG: 325 TABLET ORAL at 03:08

## 2018-01-01 RX ADMIN — ATORVASTATIN CALCIUM 80 MG: 80 TABLET, FILM COATED ORAL at 21:25

## 2018-01-01 RX ADMIN — CEPHALEXIN 500 MG: 500 CAPSULE ORAL at 15:22

## 2018-01-01 RX ADMIN — FLUTICASONE PROPIONATE 1 SPRAY: 50 SPRAY, METERED NASAL at 21:25

## 2018-01-01 RX ADMIN — LISINOPRIL 2.5 MG: 2.5 TABLET ORAL at 09:11

## 2018-01-01 RX ADMIN — LABETALOL HCL 50 MG: 100 TABLET, FILM COATED ORAL at 09:56

## 2018-01-01 RX ADMIN — FLUTICASONE PROPIONATE 1 SPRAY: 50 SPRAY, METERED NASAL at 09:02

## 2018-01-01 RX ADMIN — CLOPIDOGREL 75 MG: 75 TABLET, FILM COATED ORAL at 08:37

## 2018-01-01 RX ADMIN — ATORVASTATIN CALCIUM 80 MG: 80 TABLET, FILM COATED ORAL at 20:21

## 2018-01-01 RX ADMIN — Medication 27 MILLICURIE: at 14:25

## 2018-01-01 RX ADMIN — LISINOPRIL 2.5 MG: 2.5 TABLET ORAL at 08:37

## 2018-01-01 RX ADMIN — ACETAMINOPHEN 650 MG: 325 TABLET ORAL at 15:21

## 2018-01-01 RX ADMIN — CEPHALEXIN 500 MG: 500 CAPSULE ORAL at 06:09

## 2018-01-01 RX ADMIN — ALBUTEROL SULFATE 5 MG: 5 SOLUTION RESPIRATORY (INHALATION) at 16:10

## 2018-01-01 RX ADMIN — SODIUM CHLORIDE, PRESERVATIVE FREE 100 UNITS: 5 INJECTION INTRAVENOUS at 13:00

## 2018-01-01 RX ADMIN — OXYCODONE HYDROCHLORIDE 5 MG: 5 TABLET ORAL at 16:23

## 2018-01-01 RX ADMIN — LABETALOL HCL 50 MG: 100 TABLET, FILM COATED ORAL at 22:26

## 2018-01-01 RX ADMIN — ASPIRIN 81 MG 81 MG: 81 TABLET ORAL at 16:20

## 2018-01-01 RX ADMIN — SPIRONOLACTONE 25 MG: 25 TABLET ORAL at 20:21

## 2018-01-01 RX ADMIN — LISINOPRIL 2.5 MG: 2.5 TABLET ORAL at 09:56

## 2018-01-01 RX ADMIN — ACETAMINOPHEN 650 MG: 325 TABLET ORAL at 22:43

## 2018-01-01 RX ADMIN — LISINOPRIL 2.5 MG: 2.5 TABLET ORAL at 16:20

## 2018-01-01 RX ADMIN — VANCOMYCIN HYDROCHLORIDE 1000 MG: 1 INJECTION, SOLUTION INTRAVENOUS at 16:19

## 2018-01-01 RX ADMIN — Medication 10 ML: at 19:44

## 2018-01-01 RX ADMIN — CLOPIDOGREL 75 MG: 75 TABLET, FILM COATED ORAL at 09:11

## 2018-01-01 RX ADMIN — IBUPROFEN 400 MG: 400 TABLET, FILM COATED ORAL at 10:14

## 2018-01-01 RX ADMIN — CLOPIDOGREL 75 MG: 75 TABLET, FILM COATED ORAL at 09:02

## 2018-01-01 RX ADMIN — ATORVASTATIN CALCIUM 80 MG: 80 TABLET, FILM COATED ORAL at 19:43

## 2018-01-01 RX ADMIN — LABETALOL HCL 50 MG: 100 TABLET, FILM COATED ORAL at 19:43

## 2018-01-01 RX ADMIN — BUMETANIDE 1 MG: 1 TABLET ORAL at 09:02

## 2018-01-01 RX ADMIN — SODIUM CHLORIDE: 9 INJECTION, SOLUTION INTRAVENOUS at 13:51

## 2018-01-01 RX ADMIN — FLUTICASONE PROPIONATE 1 SPRAY: 50 SPRAY, METERED NASAL at 09:11

## 2018-01-01 RX ADMIN — SPIRONOLACTONE 25 MG: 25 TABLET ORAL at 16:20

## 2018-01-01 RX ADMIN — ONDANSETRON 4 MG: 2 INJECTION INTRAMUSCULAR; INTRAVENOUS at 21:05

## 2018-01-01 RX ADMIN — SPIRONOLACTONE 25 MG: 25 TABLET ORAL at 09:02

## 2018-01-01 RX ADMIN — BUMETANIDE 1 MG: 0.25 INJECTION INTRAMUSCULAR; INTRAVENOUS at 14:06

## 2018-01-01 RX ADMIN — Medication 10 ML: at 19:37

## 2018-01-01 RX ADMIN — NITROGLYCERIN 5 MCG/MIN: 20 INJECTION INTRAVENOUS at 16:59

## 2018-01-01 RX ADMIN — ONDANSETRON 4 MG: 2 INJECTION INTRAMUSCULAR; INTRAVENOUS at 18:14

## 2018-01-01 RX ADMIN — BUMETANIDE 0.5 MG: 1 TABLET ORAL at 09:55

## 2018-01-01 RX ADMIN — SODIUM CHLORIDE, PRESERVATIVE FREE 10 ML: 5 INJECTION INTRAVENOUS at 10:14

## 2018-01-01 RX ADMIN — HYDROCHLOROTHIAZIDE 25 MG: 25 TABLET ORAL at 16:59

## 2018-01-01 RX ADMIN — FLUTICASONE PROPIONATE 1 SPRAY: 50 SPRAY, METERED NASAL at 09:56

## 2018-01-01 RX ADMIN — SPIRONOLACTONE 25 MG: 25 TABLET ORAL at 09:11

## 2018-01-01 RX ADMIN — FLUTICASONE PROPIONATE 1 SPRAY: 50 SPRAY, METERED NASAL at 08:37

## 2018-01-01 RX ADMIN — ATORVASTATIN CALCIUM 80 MG: 80 TABLET, FILM COATED ORAL at 20:05

## 2018-01-01 RX ADMIN — PANTOPRAZOLE SODIUM 40 MG: 40 TABLET, DELAYED RELEASE ORAL at 10:14

## 2018-01-01 RX ADMIN — SODIUM CHLORIDE, PRESERVATIVE FREE 10 ML: 5 INJECTION INTRAVENOUS at 20:00

## 2018-01-01 RX ADMIN — ONDANSETRON 4 MG: 2 INJECTION INTRAMUSCULAR; INTRAVENOUS at 00:20

## 2018-01-01 RX ADMIN — CLOPIDOGREL 75 MG: 75 TABLET, FILM COATED ORAL at 09:56

## 2018-01-01 RX ADMIN — CEPHALEXIN 500 MG: 500 CAPSULE ORAL at 22:26

## 2018-01-01 RX ADMIN — SODIUM CHLORIDE, PRESERVATIVE FREE 10 ML: 5 INJECTION INTRAVENOUS at 14:25

## 2018-01-01 RX ADMIN — Medication 10 ML: at 09:12

## 2018-01-01 RX ADMIN — ACETAMINOPHEN 650 MG: 325 TABLET ORAL at 03:42

## 2018-01-01 RX ADMIN — LISINOPRIL 2.5 MG: 2.5 TABLET ORAL at 09:02

## 2018-01-01 RX ADMIN — LABETALOL HCL 50 MG: 100 TABLET, FILM COATED ORAL at 16:19

## 2018-01-01 ASSESSMENT — ENCOUNTER SYMPTOMS
COUGH: 0
DIARRHEA: 0
NAUSEA: 0
SORE THROAT: 0
EYE REDNESS: 0
VOMITING: 0
COUGH: 0
NAUSEA: 0
ABDOMINAL PAIN: 0
CHEST TIGHTNESS: 1
VOMITING: 0
SHORTNESS OF BREATH: 0
RHINORRHEA: 0
BACK PAIN: 0
CONSTIPATION: 0
ABDOMINAL PAIN: 0
EYE ITCHING: 0
DIARRHEA: 0
BACK PAIN: 0
SHORTNESS OF BREATH: 1

## 2018-01-01 ASSESSMENT — PAIN SCALES - GENERAL
PAINLEVEL_OUTOF10: 6
PAINLEVEL_OUTOF10: 4
PAINLEVEL_OUTOF10: 0
PAINLEVEL_OUTOF10: 0
PAINLEVEL_OUTOF10: 3
PAINLEVEL_OUTOF10: 0
PAINLEVEL_OUTOF10: 0
PAINLEVEL_OUTOF10: 4
PAINLEVEL_OUTOF10: 0
PAINLEVEL_OUTOF10: 5
PAINLEVEL_OUTOF10: 0
PAINLEVEL_OUTOF10: 5

## 2018-01-01 ASSESSMENT — PATIENT HEALTH QUESTIONNAIRE - PHQ9
2. FEELING DOWN, DEPRESSED OR HOPELESS: 0
1. LITTLE INTEREST OR PLEASURE IN DOING THINGS: 0
SUM OF ALL RESPONSES TO PHQ9 QUESTIONS 1 & 2: 0
SUM OF ALL RESPONSES TO PHQ QUESTIONS 1-9: 0
SUM OF ALL RESPONSES TO PHQ QUESTIONS 1-9: 0

## 2018-04-03 ENCOUNTER — APPOINTMENT (OUTPATIENT)
Dept: GENERAL RADIOLOGY | Age: 63
DRG: 246 | End: 2018-04-03
Payer: MEDICARE

## 2018-04-03 ENCOUNTER — HOSPITAL ENCOUNTER (INPATIENT)
Age: 63
LOS: 4 days | Discharge: HOME | DRG: 246 | End: 2018-04-08
Attending: EMERGENCY MEDICINE | Admitting: HOSPITALIST
Payer: MEDICARE

## 2018-04-03 DIAGNOSIS — I21.4 NSTEMI (NON-ST ELEVATED MYOCARDIAL INFARCTION) (HCC): Primary | ICD-10-CM

## 2018-04-03 LAB
D-DIMER QUANTITATIVE: 0.69 MG/L FEU
POC TROPONIN I: 0.1 NG/ML (ref 0–0.1)
POC TROPONIN INTERP: NORMAL

## 2018-04-03 PROCEDURE — 85379 FIBRIN DEGRADATION QUANT: CPT

## 2018-04-03 PROCEDURE — 85025 COMPLETE CBC W/AUTO DIFF WBC: CPT

## 2018-04-03 PROCEDURE — 83880 ASSAY OF NATRIURETIC PEPTIDE: CPT

## 2018-04-03 PROCEDURE — 71046 X-RAY EXAM CHEST 2 VIEWS: CPT

## 2018-04-03 PROCEDURE — 80048 BASIC METABOLIC PNL TOTAL CA: CPT

## 2018-04-03 PROCEDURE — 84484 ASSAY OF TROPONIN QUANT: CPT

## 2018-04-03 PROCEDURE — 93005 ELECTROCARDIOGRAM TRACING: CPT

## 2018-04-03 PROCEDURE — 6370000000 HC RX 637 (ALT 250 FOR IP): Performed by: EMERGENCY MEDICINE

## 2018-04-03 PROCEDURE — 99285 EMERGENCY DEPT VISIT HI MDM: CPT

## 2018-04-03 RX ORDER — LISINOPRIL 2.5 MG/1
2.5 TABLET ORAL ONCE
Status: COMPLETED | OUTPATIENT
Start: 2018-04-03 | End: 2018-04-03

## 2018-04-03 RX ORDER — METOPROLOL TARTRATE 50 MG/1
25 TABLET, FILM COATED ORAL ONCE
Status: COMPLETED | OUTPATIENT
Start: 2018-04-03 | End: 2018-04-03

## 2018-04-03 RX ADMIN — LISINOPRIL 2.5 MG: 2.5 TABLET ORAL at 23:39

## 2018-04-03 RX ADMIN — METOPROLOL TARTRATE 25 MG: 50 TABLET, FILM COATED ORAL at 23:26

## 2018-04-04 ENCOUNTER — APPOINTMENT (OUTPATIENT)
Dept: CT IMAGING | Age: 63
DRG: 246 | End: 2018-04-04
Payer: MEDICARE

## 2018-04-04 PROBLEM — I21.4 NSTEMI (NON-ST ELEVATED MYOCARDIAL INFARCTION) (HCC): Status: ACTIVE | Noted: 2018-04-04

## 2018-04-04 LAB
ABSOLUTE EOS #: 0.41 K/UL (ref 0–0.44)
ABSOLUTE IMMATURE GRANULOCYTE: <0.03 K/UL (ref 0–0.3)
ABSOLUTE LYMPH #: 2.21 K/UL (ref 1.1–3.7)
ABSOLUTE MONO #: 0.64 K/UL (ref 0.1–1.2)
ANION GAP SERPL CALCULATED.3IONS-SCNC: 11 MMOL/L (ref 9–17)
BASOPHILS # BLD: 1 % (ref 0–2)
BASOPHILS ABSOLUTE: 0.04 K/UL (ref 0–0.2)
BNP INTERPRETATION: ABNORMAL
BUN BLDV-MCNC: 28 MG/DL (ref 8–23)
BUN/CREAT BLD: ABNORMAL (ref 9–20)
CALCIUM SERPL-MCNC: 9.6 MG/DL (ref 8.6–10.4)
CHLORIDE BLD-SCNC: 106 MMOL/L (ref 98–107)
CO2: 22 MMOL/L (ref 20–31)
CREAT SERPL-MCNC: 0.62 MG/DL (ref 0.5–0.9)
DIFFERENTIAL TYPE: ABNORMAL
EKG ATRIAL RATE: 106 BPM
EKG P AXIS: 68 DEGREES
EKG P-R INTERVAL: 146 MS
EKG Q-T INTERVAL: 392 MS
EKG QRS DURATION: 130 MS
EKG QTC CALCULATION (BAZETT): 520 MS
EKG R AXIS: -15 DEGREES
EKG T AXIS: 64 DEGREES
EKG VENTRICULAR RATE: 106 BPM
EOSINOPHILS RELATIVE PERCENT: 5 % (ref 1–4)
GFR AFRICAN AMERICAN: >60 ML/MIN
GFR NON-AFRICAN AMERICAN: >60 ML/MIN
GFR SERPL CREATININE-BSD FRML MDRD: ABNORMAL ML/MIN/{1.73_M2}
GFR SERPL CREATININE-BSD FRML MDRD: ABNORMAL ML/MIN/{1.73_M2}
GLUCOSE BLD-MCNC: 107 MG/DL (ref 70–99)
HCT VFR BLD CALC: 41.8 % (ref 36.3–47.1)
HEMOGLOBIN: 13.5 G/DL (ref 11.9–15.1)
IMMATURE GRANULOCYTES: 0 %
LACTIC ACID, WHOLE BLOOD: 1.4 MMOL/L (ref 0.7–2.1)
LV EF: 35 %
LVEF MODALITY: NORMAL
LYMPHOCYTES # BLD: 28 % (ref 24–43)
MCH RBC QN AUTO: 29.3 PG (ref 25.2–33.5)
MCHC RBC AUTO-ENTMCNC: 32.3 G/DL (ref 28.4–34.8)
MCV RBC AUTO: 90.9 FL (ref 82.6–102.9)
MONOCYTES # BLD: 8 % (ref 3–12)
NRBC AUTOMATED: 0 PER 100 WBC
PARTIAL THROMBOPLASTIN TIME: 19.7 SEC (ref 20.5–30.5)
PARTIAL THROMBOPLASTIN TIME: 47.1 SEC (ref 20.5–30.5)
PARTIAL THROMBOPLASTIN TIME: 63.2 SEC (ref 20.5–30.5)
PARTIAL THROMBOPLASTIN TIME: 63.6 SEC (ref 20.5–30.5)
PDW BLD-RTO: 13.8 % (ref 11.8–14.4)
PLATELET # BLD: ABNORMAL K/UL (ref 138–453)
PLATELET ESTIMATE: ABNORMAL
PLATELET, FLUORESCENCE: NORMAL K/UL (ref 138–453)
PLATELET, IMMATURE FRACTION: NORMAL % (ref 1.1–10.3)
PMV BLD AUTO: ABNORMAL FL (ref 8.1–13.5)
POTASSIUM SERPL-SCNC: 4.4 MMOL/L (ref 3.7–5.3)
PRO-BNP: 3490 PG/ML
RBC # BLD: 4.6 M/UL (ref 3.95–5.11)
RBC # BLD: ABNORMAL 10*6/UL
SEG NEUTROPHILS: 58 % (ref 36–65)
SEGMENTED NEUTROPHILS ABSOLUTE COUNT: 4.57 K/UL (ref 1.5–8.1)
SODIUM BLD-SCNC: 139 MMOL/L (ref 135–144)
TROPONIN INTERP: ABNORMAL
TROPONIN T: 0.06 NG/ML
TROPONIN T: 0.07 NG/ML
TROPONIN T: 0.08 NG/ML
TROPONIN T: 0.09 NG/ML
WBC # BLD: 7.9 K/UL (ref 3.5–11.3)
WBC # BLD: ABNORMAL 10*3/UL

## 2018-04-04 PROCEDURE — 6360000002 HC RX W HCPCS: Performed by: STUDENT IN AN ORGANIZED HEALTH CARE EDUCATION/TRAINING PROGRAM

## 2018-04-04 PROCEDURE — 93306 TTE W/DOPPLER COMPLETE: CPT

## 2018-04-04 PROCEDURE — 84484 ASSAY OF TROPONIN QUANT: CPT

## 2018-04-04 PROCEDURE — 6370000000 HC RX 637 (ALT 250 FOR IP): Performed by: STUDENT IN AN ORGANIZED HEALTH CARE EDUCATION/TRAINING PROGRAM

## 2018-04-04 PROCEDURE — 85730 THROMBOPLASTIN TIME PARTIAL: CPT

## 2018-04-04 PROCEDURE — 83605 ASSAY OF LACTIC ACID: CPT

## 2018-04-04 PROCEDURE — 71260 CT THORAX DX C+: CPT

## 2018-04-04 PROCEDURE — 6360000004 HC RX CONTRAST MEDICATION: Performed by: EMERGENCY MEDICINE

## 2018-04-04 PROCEDURE — 6360000002 HC RX W HCPCS: Performed by: EMERGENCY MEDICINE

## 2018-04-04 PROCEDURE — 36415 COLL VENOUS BLD VENIPUNCTURE: CPT

## 2018-04-04 PROCEDURE — 85055 RETICULATED PLATELET ASSAY: CPT

## 2018-04-04 PROCEDURE — 2060000000 HC ICU INTERMEDIATE R&B

## 2018-04-04 PROCEDURE — 99222 1ST HOSP IP/OBS MODERATE 55: CPT | Performed by: HOSPITALIST

## 2018-04-04 PROCEDURE — 96374 THER/PROPH/DIAG INJ IV PUSH: CPT

## 2018-04-04 PROCEDURE — 2580000003 HC RX 258: Performed by: STUDENT IN AN ORGANIZED HEALTH CARE EDUCATION/TRAINING PROGRAM

## 2018-04-04 RX ORDER — ATORVASTATIN CALCIUM 80 MG/1
80 TABLET, FILM COATED ORAL NIGHTLY
Status: DISCONTINUED | OUTPATIENT
Start: 2018-04-04 | End: 2018-04-08 | Stop reason: HOSPADM

## 2018-04-04 RX ORDER — HEPARIN SODIUM 1000 [USP'U]/ML
2000 INJECTION, SOLUTION INTRAVENOUS; SUBCUTANEOUS PRN
Status: DISCONTINUED | OUTPATIENT
Start: 2018-04-04 | End: 2018-04-06

## 2018-04-04 RX ORDER — LISINOPRIL 2.5 MG/1
2.5 TABLET ORAL DAILY
Status: DISCONTINUED | OUTPATIENT
Start: 2018-04-04 | End: 2018-04-05

## 2018-04-04 RX ORDER — SODIUM CHLORIDE 0.9 % (FLUSH) 0.9 %
10 SYRINGE (ML) INJECTION PRN
Status: DISCONTINUED | OUTPATIENT
Start: 2018-04-04 | End: 2018-04-08 | Stop reason: HOSPADM

## 2018-04-04 RX ORDER — ONDANSETRON 2 MG/ML
4 INJECTION INTRAMUSCULAR; INTRAVENOUS ONCE
Status: COMPLETED | OUTPATIENT
Start: 2018-04-04 | End: 2018-04-04

## 2018-04-04 RX ORDER — CLOPIDOGREL BISULFATE 75 MG/1
75 TABLET ORAL DAILY
Status: DISCONTINUED | OUTPATIENT
Start: 2018-04-04 | End: 2018-04-06

## 2018-04-04 RX ORDER — ACETAMINOPHEN 325 MG/1
650 TABLET ORAL EVERY 4 HOURS PRN
Status: DISCONTINUED | OUTPATIENT
Start: 2018-04-04 | End: 2018-04-08 | Stop reason: HOSPADM

## 2018-04-04 RX ORDER — ONDANSETRON 2 MG/ML
4 INJECTION INTRAMUSCULAR; INTRAVENOUS EVERY 6 HOURS PRN
Status: DISCONTINUED | OUTPATIENT
Start: 2018-04-04 | End: 2018-04-08

## 2018-04-04 RX ORDER — NITROGLYCERIN 0.4 MG/1
0.4 TABLET SUBLINGUAL EVERY 5 MIN PRN
Status: DISCONTINUED | OUTPATIENT
Start: 2018-04-04 | End: 2018-04-08 | Stop reason: HOSPADM

## 2018-04-04 RX ORDER — ASPIRIN 81 MG/1
81 TABLET, CHEWABLE ORAL DAILY
Status: DISCONTINUED | OUTPATIENT
Start: 2018-04-04 | End: 2018-04-08 | Stop reason: HOSPADM

## 2018-04-04 RX ORDER — RANOLAZINE 500 MG/1
1000 TABLET, EXTENDED RELEASE ORAL 2 TIMES DAILY
Status: DISCONTINUED | OUTPATIENT
Start: 2018-04-04 | End: 2018-04-08 | Stop reason: HOSPADM

## 2018-04-04 RX ORDER — SODIUM CHLORIDE 0.9 % (FLUSH) 0.9 %
10 SYRINGE (ML) INJECTION EVERY 12 HOURS SCHEDULED
Status: DISCONTINUED | OUTPATIENT
Start: 2018-04-04 | End: 2018-04-08 | Stop reason: HOSPADM

## 2018-04-04 RX ORDER — HEPARIN SODIUM 10000 [USP'U]/100ML
12 INJECTION, SOLUTION INTRAVENOUS CONTINUOUS
Status: DISCONTINUED | OUTPATIENT
Start: 2018-04-04 | End: 2018-04-06

## 2018-04-04 RX ORDER — HEPARIN SODIUM 1000 [USP'U]/ML
4000 INJECTION, SOLUTION INTRAVENOUS; SUBCUTANEOUS ONCE
Status: COMPLETED | OUTPATIENT
Start: 2018-04-04 | End: 2018-04-04

## 2018-04-04 RX ORDER — HEPARIN SODIUM 1000 [USP'U]/ML
4000 INJECTION, SOLUTION INTRAVENOUS; SUBCUTANEOUS PRN
Status: DISCONTINUED | OUTPATIENT
Start: 2018-04-04 | End: 2018-04-06

## 2018-04-04 RX ADMIN — ONDANSETRON 4 MG: 2 INJECTION INTRAMUSCULAR; INTRAVENOUS at 12:30

## 2018-04-04 RX ADMIN — ATORVASTATIN CALCIUM 80 MG: 80 TABLET, FILM COATED ORAL at 21:58

## 2018-04-04 RX ADMIN — Medication 10 ML: at 08:38

## 2018-04-04 RX ADMIN — IOPAMIDOL 75 ML: 755 INJECTION, SOLUTION INTRAVENOUS at 01:30

## 2018-04-04 RX ADMIN — METOPROLOL TARTRATE 25 MG: 25 TABLET ORAL at 08:38

## 2018-04-04 RX ADMIN — RANOLAZINE 1000 MG: 500 TABLET, FILM COATED, EXTENDED RELEASE ORAL at 21:58

## 2018-04-04 RX ADMIN — ASPIRIN 81 MG: 81 TABLET, CHEWABLE ORAL at 10:45

## 2018-04-04 RX ADMIN — HEPARIN SODIUM AND DEXTROSE 12 UNITS/KG/HR: 10000; 5 INJECTION INTRAVENOUS at 01:36

## 2018-04-04 RX ADMIN — HEPARIN SODIUM AND DEXTROSE 14 UNITS/KG/HR: 10000; 5 INJECTION INTRAVENOUS at 23:02

## 2018-04-04 RX ADMIN — CLOPIDOGREL 75 MG: 75 TABLET, FILM COATED ORAL at 08:38

## 2018-04-04 RX ADMIN — HEPARIN SODIUM 2000 UNITS: 1000 INJECTION, SOLUTION INTRAVENOUS; SUBCUTANEOUS at 10:43

## 2018-04-04 RX ADMIN — ONDANSETRON 4 MG: 2 INJECTION INTRAMUSCULAR; INTRAVENOUS at 06:11

## 2018-04-04 RX ADMIN — HEPARIN SODIUM 4000 UNITS: 1000 INJECTION, SOLUTION INTRAVENOUS; SUBCUTANEOUS at 01:34

## 2018-04-04 RX ADMIN — METOPROLOL TARTRATE 25 MG: 25 TABLET ORAL at 21:58

## 2018-04-04 RX ADMIN — ONDANSETRON 4 MG: 2 INJECTION INTRAMUSCULAR; INTRAVENOUS at 01:42

## 2018-04-04 RX ADMIN — RANOLAZINE 1000 MG: 500 TABLET, FILM COATED, EXTENDED RELEASE ORAL at 08:38

## 2018-04-04 RX ADMIN — LISINOPRIL 2.5 MG: 2.5 TABLET ORAL at 10:45

## 2018-04-04 ASSESSMENT — ENCOUNTER SYMPTOMS
NAUSEA: 0
FACIAL SWELLING: 0
VOMITING: 0
SHORTNESS OF BREATH: 1
DIARRHEA: 0
WHEEZING: 0
SINUS PAIN: 0
CONSTIPATION: 0
CHOKING: 0
CHEST TIGHTNESS: 0
ABDOMINAL PAIN: 0
COLOR CHANGE: 0
PHOTOPHOBIA: 0

## 2018-04-04 ASSESSMENT — PAIN DESCRIPTION - ORIENTATION
ORIENTATION: MID
ORIENTATION: MID

## 2018-04-04 ASSESSMENT — PAIN DESCRIPTION - PAIN TYPE
TYPE: ACUTE PAIN
TYPE: ACUTE PAIN

## 2018-04-04 ASSESSMENT — PAIN DESCRIPTION - PROGRESSION: CLINICAL_PROGRESSION: NOT CHANGED

## 2018-04-04 ASSESSMENT — HEART SCORE: ECG: 0

## 2018-04-04 ASSESSMENT — PAIN DESCRIPTION - DESCRIPTORS: DESCRIPTORS: ACHING;PRESSURE

## 2018-04-04 ASSESSMENT — PAIN SCALES - GENERAL
PAINLEVEL_OUTOF10: 0
PAINLEVEL_OUTOF10: 3

## 2018-04-04 ASSESSMENT — PAIN DESCRIPTION - FREQUENCY: FREQUENCY: CONTINUOUS

## 2018-04-04 ASSESSMENT — PAIN DESCRIPTION - ONSET: ONSET: GRADUAL

## 2018-04-04 ASSESSMENT — PAIN DESCRIPTION - LOCATION
LOCATION: CHEST
LOCATION: CHEST

## 2018-04-05 ENCOUNTER — APPOINTMENT (OUTPATIENT)
Dept: GENERAL RADIOLOGY | Age: 63
DRG: 246 | End: 2018-04-05
Payer: MEDICARE

## 2018-04-05 ENCOUNTER — APPOINTMENT (OUTPATIENT)
Dept: CARDIAC CATH/INVASIVE PROCEDURES | Age: 63
DRG: 246 | End: 2018-04-05
Payer: MEDICARE

## 2018-04-05 PROBLEM — Z95.5 S/P DRUG ELUTING CORONARY STENT PLACEMENT: Status: ACTIVE | Noted: 2018-04-05

## 2018-04-05 LAB
ABSOLUTE EOS #: 0.38 K/UL (ref 0–0.44)
ABSOLUTE IMMATURE GRANULOCYTE: <0.03 K/UL (ref 0–0.3)
ABSOLUTE LYMPH #: 2.69 K/UL (ref 1.1–3.7)
ABSOLUTE MONO #: 0.59 K/UL (ref 0.1–1.2)
ACTIVATED CLOTTING TIME: 278 SEC (ref 79–149)
ALBUMIN SERPL-MCNC: 3.4 G/DL (ref 3.5–5.2)
ALBUMIN/GLOBULIN RATIO: 1.4 (ref 1–2.5)
ALP BLD-CCNC: 73 U/L (ref 35–104)
ALT SERPL-CCNC: 13 U/L (ref 5–33)
ANION GAP SERPL CALCULATED.3IONS-SCNC: 10 MMOL/L (ref 9–17)
AST SERPL-CCNC: 19 U/L
BASOPHILS # BLD: 1 % (ref 0–2)
BASOPHILS ABSOLUTE: 0.05 K/UL (ref 0–0.2)
BILIRUB SERPL-MCNC: 0.49 MG/DL (ref 0.3–1.2)
BILIRUBIN URINE: NEGATIVE
BUN BLDV-MCNC: 25 MG/DL (ref 8–23)
BUN/CREAT BLD: ABNORMAL (ref 9–20)
CALCIUM SERPL-MCNC: 8.4 MG/DL (ref 8.6–10.4)
CHLORIDE BLD-SCNC: 107 MMOL/L (ref 98–107)
CO2: 22 MMOL/L (ref 20–31)
COLOR: YELLOW
COMMENT UA: NORMAL
CREAT SERPL-MCNC: 0.67 MG/DL (ref 0.5–0.9)
DIFFERENTIAL TYPE: ABNORMAL
EOSINOPHILS RELATIVE PERCENT: 6 % (ref 1–4)
GFR AFRICAN AMERICAN: >60 ML/MIN
GFR NON-AFRICAN AMERICAN: >60 ML/MIN
GFR SERPL CREATININE-BSD FRML MDRD: ABNORMAL ML/MIN/{1.73_M2}
GFR SERPL CREATININE-BSD FRML MDRD: ABNORMAL ML/MIN/{1.73_M2}
GLUCOSE BLD-MCNC: 133 MG/DL (ref 65–105)
GLUCOSE BLD-MCNC: 97 MG/DL (ref 70–99)
GLUCOSE URINE: NEGATIVE
HCT VFR BLD CALC: 37.3 % (ref 36.3–47.1)
HEMOGLOBIN: 11.8 G/DL (ref 11.9–15.1)
IMMATURE GRANULOCYTES: 0 %
KETONES, URINE: NEGATIVE
LEUKOCYTE ESTERASE, URINE: NEGATIVE
LYMPHOCYTES # BLD: 42 % (ref 24–43)
MCH RBC QN AUTO: 29.4 PG (ref 25.2–33.5)
MCHC RBC AUTO-ENTMCNC: 31.6 G/DL (ref 28.4–34.8)
MCV RBC AUTO: 93 FL (ref 82.6–102.9)
MONOCYTES # BLD: 9 % (ref 3–12)
NITRITE, URINE: NEGATIVE
NRBC AUTOMATED: 0 PER 100 WBC
PARTIAL THROMBOPLASTIN TIME: 67.6 SEC (ref 20.5–30.5)
PDW BLD-RTO: 13.8 % (ref 11.8–14.4)
PH UA: 5.5 (ref 5–8)
PLATELET # BLD: 193 K/UL (ref 138–453)
PLATELET ESTIMATE: ABNORMAL
PMV BLD AUTO: 9.7 FL (ref 8.1–13.5)
POTASSIUM SERPL-SCNC: 4.7 MMOL/L (ref 3.7–5.3)
PROTEIN UA: NEGATIVE
RBC # BLD: 4.01 M/UL (ref 3.95–5.11)
RBC # BLD: ABNORMAL 10*6/UL
SEG NEUTROPHILS: 42 % (ref 36–65)
SEGMENTED NEUTROPHILS ABSOLUTE COUNT: 2.69 K/UL (ref 1.5–8.1)
SODIUM BLD-SCNC: 139 MMOL/L (ref 135–144)
SPECIFIC GRAVITY UA: 1.02 (ref 1–1.03)
TOTAL PROTEIN: 5.8 G/DL (ref 6.4–8.3)
TROPONIN INTERP: ABNORMAL
TROPONIN T: 0.13 NG/ML
TURBIDITY: CLEAR
URINE HGB: NEGATIVE
UROBILINOGEN, URINE: NORMAL
WBC # BLD: 6.4 K/UL (ref 3.5–11.3)
WBC # BLD: ABNORMAL 10*3/UL

## 2018-04-05 PROCEDURE — 36415 COLL VENOUS BLD VENIPUNCTURE: CPT

## 2018-04-05 PROCEDURE — 2500000003 HC RX 250 WO HCPCS: Performed by: INTERNAL MEDICINE

## 2018-04-05 PROCEDURE — C1769 GUIDE WIRE: HCPCS

## 2018-04-05 PROCEDURE — 6360000002 HC RX W HCPCS

## 2018-04-05 PROCEDURE — 6370000000 HC RX 637 (ALT 250 FOR IP): Performed by: STUDENT IN AN ORGANIZED HEALTH CARE EDUCATION/TRAINING PROGRAM

## 2018-04-05 PROCEDURE — C1760 CLOSURE DEV, VASC: HCPCS

## 2018-04-05 PROCEDURE — 84484 ASSAY OF TROPONIN QUANT: CPT

## 2018-04-05 PROCEDURE — 99232 SBSQ HOSP IP/OBS MODERATE 35: CPT | Performed by: HOSPITALIST

## 2018-04-05 PROCEDURE — 6360000004 HC RX CONTRAST MEDICATION

## 2018-04-05 PROCEDURE — 7100000011 HC PHASE II RECOVERY - ADDTL 15 MIN

## 2018-04-05 PROCEDURE — 85347 COAGULATION TIME ACTIVATED: CPT

## 2018-04-05 PROCEDURE — 85730 THROMBOPLASTIN TIME PARTIAL: CPT

## 2018-04-05 PROCEDURE — 6360000002 HC RX W HCPCS: Performed by: STUDENT IN AN ORGANIZED HEALTH CARE EDUCATION/TRAINING PROGRAM

## 2018-04-05 PROCEDURE — 81003 URINALYSIS AUTO W/O SCOPE: CPT

## 2018-04-05 PROCEDURE — 6370000000 HC RX 637 (ALT 250 FOR IP): Performed by: INTERNAL MEDICINE

## 2018-04-05 PROCEDURE — C1887 CATHETER, GUIDING: HCPCS

## 2018-04-05 PROCEDURE — C1894 INTRO/SHEATH, NON-LASER: HCPCS

## 2018-04-05 PROCEDURE — 7100000010 HC PHASE II RECOVERY - FIRST 15 MIN

## 2018-04-05 PROCEDURE — C9600 PERC DRUG-EL COR STENT SING: HCPCS | Performed by: INTERNAL MEDICINE

## 2018-04-05 PROCEDURE — 6360000002 HC RX W HCPCS: Performed by: INTERNAL MEDICINE

## 2018-04-05 PROCEDURE — C1725 CATH, TRANSLUMIN NON-LASER: HCPCS

## 2018-04-05 PROCEDURE — 2500000003 HC RX 250 WO HCPCS

## 2018-04-05 PROCEDURE — 4A023N7 MEASUREMENT OF CARDIAC SAMPLING AND PRESSURE, LEFT HEART, PERCUTANEOUS APPROACH: ICD-10-PCS | Performed by: INTERNAL MEDICINE

## 2018-04-05 PROCEDURE — C9601 PERC DRUG-EL COR STENT BRAN: HCPCS | Performed by: INTERNAL MEDICINE

## 2018-04-05 PROCEDURE — 85025 COMPLETE CBC W/AUTO DIFF WBC: CPT

## 2018-04-05 PROCEDURE — 80053 COMPREHEN METABOLIC PANEL: CPT

## 2018-04-05 PROCEDURE — 71045 X-RAY EXAM CHEST 1 VIEW: CPT

## 2018-04-05 PROCEDURE — 6370000000 HC RX 637 (ALT 250 FOR IP)

## 2018-04-05 PROCEDURE — 94660 CPAP INITIATION&MGMT: CPT

## 2018-04-05 PROCEDURE — 2580000003 HC RX 258: Performed by: INTERNAL MEDICINE

## 2018-04-05 PROCEDURE — 93005 ELECTROCARDIOGRAM TRACING: CPT

## 2018-04-05 PROCEDURE — B2111ZZ FLUOROSCOPY OF MULTIPLE CORONARY ARTERIES USING LOW OSMOLAR CONTRAST: ICD-10-PCS | Performed by: INTERNAL MEDICINE

## 2018-04-05 PROCEDURE — 82947 ASSAY GLUCOSE BLOOD QUANT: CPT

## 2018-04-05 PROCEDURE — C1874 STENT, COATED/COV W/DEL SYS: HCPCS

## 2018-04-05 PROCEDURE — 027135Z DILATION OF CORONARY ARTERY, TWO ARTERIES WITH TWO DRUG-ELUTING INTRALUMINAL DEVICES, PERCUTANEOUS APPROACH: ICD-10-PCS | Performed by: INTERNAL MEDICINE

## 2018-04-05 PROCEDURE — 2060000000 HC ICU INTERMEDIATE R&B

## 2018-04-05 RX ORDER — SODIUM CHLORIDE 9 MG/ML
INJECTION, SOLUTION INTRAVENOUS CONTINUOUS
Status: DISCONTINUED | OUTPATIENT
Start: 2018-04-05 | End: 2018-04-06

## 2018-04-05 RX ORDER — ALBUTEROL SULFATE 2.5 MG/3ML
2.5 SOLUTION RESPIRATORY (INHALATION) EVERY 6 HOURS PRN
Status: DISCONTINUED | OUTPATIENT
Start: 2018-04-05 | End: 2018-04-08 | Stop reason: HOSPADM

## 2018-04-05 RX ORDER — MORPHINE SULFATE 4 MG/ML
4 INJECTION, SOLUTION INTRAMUSCULAR; INTRAVENOUS ONCE
Status: DISCONTINUED | OUTPATIENT
Start: 2018-04-05 | End: 2018-04-06

## 2018-04-05 RX ORDER — LABETALOL HYDROCHLORIDE 5 MG/ML
10 INJECTION, SOLUTION INTRAVENOUS ONCE
Status: COMPLETED | OUTPATIENT
Start: 2018-04-05 | End: 2018-04-05

## 2018-04-05 RX ORDER — SODIUM CHLORIDE 0.9 % (FLUSH) 0.9 %
10 SYRINGE (ML) INJECTION EVERY 12 HOURS SCHEDULED
Status: DISCONTINUED | OUTPATIENT
Start: 2018-04-05 | End: 2018-04-08 | Stop reason: HOSPADM

## 2018-04-05 RX ORDER — LEVALBUTEROL INHALATION SOLUTION 1.25 MG/3ML
1.25 SOLUTION RESPIRATORY (INHALATION) EVERY 4 HOURS PRN
Status: DISCONTINUED | OUTPATIENT
Start: 2018-04-05 | End: 2018-04-05

## 2018-04-05 RX ORDER — IPRATROPIUM BROMIDE AND ALBUTEROL SULFATE 2.5; .5 MG/3ML; MG/3ML
1 SOLUTION RESPIRATORY (INHALATION) ONCE
Status: COMPLETED | OUTPATIENT
Start: 2018-04-05 | End: 2018-04-05

## 2018-04-05 RX ORDER — FUROSEMIDE 10 MG/ML
40 INJECTION INTRAMUSCULAR; INTRAVENOUS ONCE
Status: COMPLETED | OUTPATIENT
Start: 2018-04-05 | End: 2018-04-05

## 2018-04-05 RX ORDER — LORAZEPAM 2 MG/ML
0.5 INJECTION INTRAMUSCULAR EVERY 4 HOURS PRN
Status: DISCONTINUED | OUTPATIENT
Start: 2018-04-05 | End: 2018-04-06

## 2018-04-05 RX ORDER — NITROGLYCERIN 20 MG/100ML
5 INJECTION INTRAVENOUS CONTINUOUS
Status: DISCONTINUED | OUTPATIENT
Start: 2018-04-05 | End: 2018-04-06

## 2018-04-05 RX ORDER — LABETALOL HYDROCHLORIDE 5 MG/ML
10 INJECTION, SOLUTION INTRAVENOUS EVERY 30 MIN PRN
Status: DISCONTINUED | OUTPATIENT
Start: 2018-04-05 | End: 2018-04-08 | Stop reason: HOSPADM

## 2018-04-05 RX ORDER — CARVEDILOL 6.25 MG/1
6.25 TABLET ORAL 2 TIMES DAILY WITH MEALS
Status: DISCONTINUED | OUTPATIENT
Start: 2018-04-05 | End: 2018-04-07

## 2018-04-05 RX ORDER — ACETAMINOPHEN 325 MG/1
650 TABLET ORAL EVERY 4 HOURS PRN
Status: DISCONTINUED | OUTPATIENT
Start: 2018-04-05 | End: 2018-04-08 | Stop reason: HOSPADM

## 2018-04-05 RX ORDER — FENTANYL CITRATE 50 UG/ML
25 INJECTION, SOLUTION INTRAMUSCULAR; INTRAVENOUS ONCE
Status: COMPLETED | OUTPATIENT
Start: 2018-04-05 | End: 2018-04-05

## 2018-04-05 RX ORDER — LISINOPRIL 5 MG/1
5 TABLET ORAL DAILY
Status: DISCONTINUED | OUTPATIENT
Start: 2018-04-05 | End: 2018-04-08 | Stop reason: HOSPADM

## 2018-04-05 RX ORDER — LORAZEPAM 2 MG/ML
1 INJECTION INTRAMUSCULAR ONCE
Status: COMPLETED | OUTPATIENT
Start: 2018-04-05 | End: 2018-04-05

## 2018-04-05 RX ORDER — SODIUM CHLORIDE 0.9 % (FLUSH) 0.9 %
10 SYRINGE (ML) INJECTION PRN
Status: DISCONTINUED | OUTPATIENT
Start: 2018-04-05 | End: 2018-04-08 | Stop reason: HOSPADM

## 2018-04-05 RX ADMIN — IPRATROPIUM BROMIDE AND ALBUTEROL SULFATE 1 AMPULE: .5; 3 SOLUTION RESPIRATORY (INHALATION) at 15:58

## 2018-04-05 RX ADMIN — LORAZEPAM 1 MG: 2 INJECTION INTRAMUSCULAR; INTRAVENOUS at 16:21

## 2018-04-05 RX ADMIN — ATORVASTATIN CALCIUM 80 MG: 80 TABLET, FILM COATED ORAL at 21:23

## 2018-04-05 RX ADMIN — CARVEDILOL 6.25 MG: 6.25 TABLET, FILM COATED ORAL at 16:11

## 2018-04-05 RX ADMIN — LABETALOL HYDROCHLORIDE 10 MG: 5 INJECTION, SOLUTION INTRAVENOUS at 16:33

## 2018-04-05 RX ADMIN — LORAZEPAM 0.5 MG: 2 INJECTION INTRAMUSCULAR; INTRAVENOUS at 21:24

## 2018-04-05 RX ADMIN — SODIUM CHLORIDE: 9 INJECTION, SOLUTION INTRAVENOUS at 20:57

## 2018-04-05 RX ADMIN — RANOLAZINE 1000 MG: 500 TABLET, FILM COATED, EXTENDED RELEASE ORAL at 08:39

## 2018-04-05 RX ADMIN — RANOLAZINE 1000 MG: 500 TABLET, FILM COATED, EXTENDED RELEASE ORAL at 21:23

## 2018-04-05 RX ADMIN — Medication 10 ML: at 20:58

## 2018-04-05 RX ADMIN — LABETALOL HYDROCHLORIDE 10 MG: 5 INJECTION, SOLUTION INTRAVENOUS at 16:01

## 2018-04-05 RX ADMIN — FUROSEMIDE 40 MG: 10 INJECTION, SOLUTION INTRAMUSCULAR; INTRAVENOUS at 15:45

## 2018-04-05 RX ADMIN — FUROSEMIDE 40 MG: 10 INJECTION, SOLUTION INTRAMUSCULAR; INTRAVENOUS at 16:16

## 2018-04-05 RX ADMIN — FENTANYL CITRATE 25 MCG: 50 INJECTION, SOLUTION INTRAMUSCULAR; INTRAVENOUS at 15:59

## 2018-04-05 RX ADMIN — LABETALOL HYDROCHLORIDE 10 MG: 5 INJECTION, SOLUTION INTRAVENOUS at 15:46

## 2018-04-05 RX ADMIN — METOPROLOL TARTRATE 25 MG: 25 TABLET ORAL at 08:40

## 2018-04-05 RX ADMIN — LISINOPRIL 2.5 MG: 2.5 TABLET ORAL at 08:39

## 2018-04-05 RX ADMIN — ONDANSETRON 4 MG: 2 INJECTION INTRAMUSCULAR; INTRAVENOUS at 12:34

## 2018-04-05 RX ADMIN — SODIUM CHLORIDE: 9 INJECTION, SOLUTION INTRAVENOUS at 12:16

## 2018-04-05 ASSESSMENT — PAIN SCALES - GENERAL: PAINLEVEL_OUTOF10: 4

## 2018-04-06 ENCOUNTER — APPOINTMENT (OUTPATIENT)
Dept: GENERAL RADIOLOGY | Age: 63
DRG: 246 | End: 2018-04-06
Payer: MEDICARE

## 2018-04-06 LAB
ABSOLUTE EOS #: 0.17 K/UL (ref 0–0.44)
ABSOLUTE IMMATURE GRANULOCYTE: 0.05 K/UL (ref 0–0.3)
ABSOLUTE LYMPH #: 1.74 K/UL (ref 1.1–3.7)
ABSOLUTE MONO #: 0.96 K/UL (ref 0.1–1.2)
ALBUMIN SERPL-MCNC: 3.8 G/DL (ref 3.5–5.2)
ALBUMIN/GLOBULIN RATIO: 1.5 (ref 1–2.5)
ALP BLD-CCNC: 84 U/L (ref 35–104)
ALT SERPL-CCNC: 12 U/L (ref 5–33)
ANION GAP SERPL CALCULATED.3IONS-SCNC: 15 MMOL/L (ref 9–17)
AST SERPL-CCNC: 19 U/L
BASOPHILS # BLD: 0 % (ref 0–2)
BASOPHILS ABSOLUTE: <0.03 K/UL (ref 0–0.2)
BILIRUB SERPL-MCNC: 0.51 MG/DL (ref 0.3–1.2)
BUN BLDV-MCNC: 26 MG/DL (ref 8–23)
BUN/CREAT BLD: ABNORMAL (ref 9–20)
CALCIUM SERPL-MCNC: 9.3 MG/DL (ref 8.6–10.4)
CHLORIDE BLD-SCNC: 104 MMOL/L (ref 98–107)
CO2: 22 MMOL/L (ref 20–31)
CREAT SERPL-MCNC: 0.85 MG/DL (ref 0.5–0.9)
DIFFERENTIAL TYPE: ABNORMAL
EKG ATRIAL RATE: 86 BPM
EKG P AXIS: 69 DEGREES
EKG P-R INTERVAL: 158 MS
EKG Q-T INTERVAL: 458 MS
EKG QRS DURATION: 158 MS
EKG QTC CALCULATION (BAZETT): 548 MS
EKG R AXIS: 18 DEGREES
EKG T AXIS: 56 DEGREES
EKG VENTRICULAR RATE: 86 BPM
EOSINOPHILS RELATIVE PERCENT: 2 % (ref 1–4)
GFR AFRICAN AMERICAN: >60 ML/MIN
GFR NON-AFRICAN AMERICAN: >60 ML/MIN
GFR SERPL CREATININE-BSD FRML MDRD: ABNORMAL ML/MIN/{1.73_M2}
GFR SERPL CREATININE-BSD FRML MDRD: ABNORMAL ML/MIN/{1.73_M2}
GLUCOSE BLD-MCNC: 103 MG/DL (ref 70–99)
HCT VFR BLD CALC: 39 % (ref 36.3–47.1)
HEMOGLOBIN: 12.3 G/DL (ref 11.9–15.1)
IMMATURE GRANULOCYTES: 1 %
LYMPHOCYTES # BLD: 20 % (ref 24–43)
MCH RBC QN AUTO: 29.3 PG (ref 25.2–33.5)
MCHC RBC AUTO-ENTMCNC: 31.5 G/DL (ref 28.4–34.8)
MCV RBC AUTO: 92.9 FL (ref 82.6–102.9)
MONOCYTES # BLD: 11 % (ref 3–12)
NRBC AUTOMATED: 0 PER 100 WBC
PDW BLD-RTO: 14.1 % (ref 11.8–14.4)
PLATELET # BLD: 205 K/UL (ref 138–453)
PLATELET ESTIMATE: ABNORMAL
PMV BLD AUTO: 10.1 FL (ref 8.1–13.5)
POTASSIUM SERPL-SCNC: 4.7 MMOL/L (ref 3.7–5.3)
RBC # BLD: 4.2 M/UL (ref 3.95–5.11)
RBC # BLD: ABNORMAL 10*6/UL
SEG NEUTROPHILS: 67 % (ref 36–65)
SEGMENTED NEUTROPHILS ABSOLUTE COUNT: 5.9 K/UL (ref 1.5–8.1)
SODIUM BLD-SCNC: 141 MMOL/L (ref 135–144)
TOTAL PROTEIN: 6.4 G/DL (ref 6.4–8.3)
TROPONIN INTERP: ABNORMAL
TROPONIN T: 0.15 NG/ML
WBC # BLD: 8.8 K/UL (ref 3.5–11.3)
WBC # BLD: ABNORMAL 10*3/UL

## 2018-04-06 PROCEDURE — 94762 N-INVAS EAR/PLS OXIMTRY CONT: CPT

## 2018-04-06 PROCEDURE — 6370000000 HC RX 637 (ALT 250 FOR IP): Performed by: NURSE PRACTITIONER

## 2018-04-06 PROCEDURE — G8988 SELF CARE GOAL STATUS: HCPCS

## 2018-04-06 PROCEDURE — 6370000000 HC RX 637 (ALT 250 FOR IP): Performed by: STUDENT IN AN ORGANIZED HEALTH CARE EDUCATION/TRAINING PROGRAM

## 2018-04-06 PROCEDURE — G8979 MOBILITY GOAL STATUS: HCPCS

## 2018-04-06 PROCEDURE — 97161 PT EVAL LOW COMPLEX 20 MIN: CPT

## 2018-04-06 PROCEDURE — 85025 COMPLETE CBC W/AUTO DIFF WBC: CPT

## 2018-04-06 PROCEDURE — 94640 AIRWAY INHALATION TREATMENT: CPT

## 2018-04-06 PROCEDURE — 94660 CPAP INITIATION&MGMT: CPT

## 2018-04-06 PROCEDURE — 80053 COMPREHEN METABOLIC PANEL: CPT

## 2018-04-06 PROCEDURE — 6360000002 HC RX W HCPCS: Performed by: INTERNAL MEDICINE

## 2018-04-06 PROCEDURE — 2580000003 HC RX 258: Performed by: INTERNAL MEDICINE

## 2018-04-06 PROCEDURE — 99232 SBSQ HOSP IP/OBS MODERATE 35: CPT | Performed by: HOSPITALIST

## 2018-04-06 PROCEDURE — 97530 THERAPEUTIC ACTIVITIES: CPT

## 2018-04-06 PROCEDURE — G8978 MOBILITY CURRENT STATUS: HCPCS

## 2018-04-06 PROCEDURE — 97535 SELF CARE MNGMENT TRAINING: CPT

## 2018-04-06 PROCEDURE — 2060000000 HC ICU INTERMEDIATE R&B

## 2018-04-06 PROCEDURE — 6370000000 HC RX 637 (ALT 250 FOR IP): Performed by: INTERNAL MEDICINE

## 2018-04-06 PROCEDURE — 97166 OT EVAL MOD COMPLEX 45 MIN: CPT

## 2018-04-06 PROCEDURE — 71045 X-RAY EXAM CHEST 1 VIEW: CPT

## 2018-04-06 PROCEDURE — 2580000003 HC RX 258: Performed by: STUDENT IN AN ORGANIZED HEALTH CARE EDUCATION/TRAINING PROGRAM

## 2018-04-06 PROCEDURE — G8987 SELF CARE CURRENT STATUS: HCPCS

## 2018-04-06 PROCEDURE — 36415 COLL VENOUS BLD VENIPUNCTURE: CPT

## 2018-04-06 RX ORDER — IPRATROPIUM BROMIDE AND ALBUTEROL SULFATE 2.5; .5 MG/3ML; MG/3ML
1 SOLUTION RESPIRATORY (INHALATION) EVERY 6 HOURS PRN
Status: DISCONTINUED | OUTPATIENT
Start: 2018-04-06 | End: 2018-04-08 | Stop reason: HOSPADM

## 2018-04-06 RX ORDER — LORAZEPAM 0.5 MG/1
0.5 TABLET ORAL EVERY 6 HOURS PRN
Status: DISCONTINUED | OUTPATIENT
Start: 2018-04-06 | End: 2018-04-08 | Stop reason: HOSPADM

## 2018-04-06 RX ORDER — FUROSEMIDE 20 MG/1
20 TABLET ORAL DAILY
Status: DISCONTINUED | OUTPATIENT
Start: 2018-04-06 | End: 2018-04-08 | Stop reason: HOSPADM

## 2018-04-06 RX ADMIN — CARVEDILOL 6.25 MG: 6.25 TABLET, FILM COATED ORAL at 09:26

## 2018-04-06 RX ADMIN — ATORVASTATIN CALCIUM 80 MG: 80 TABLET, FILM COATED ORAL at 20:40

## 2018-04-06 RX ADMIN — IPRATROPIUM BROMIDE AND ALBUTEROL SULFATE 1 AMPULE: .5; 3 SOLUTION RESPIRATORY (INHALATION) at 23:41

## 2018-04-06 RX ADMIN — RANOLAZINE 1000 MG: 500 TABLET, FILM COATED, EXTENDED RELEASE ORAL at 09:26

## 2018-04-06 RX ADMIN — CLOPIDOGREL 75 MG: 75 TABLET, FILM COATED ORAL at 09:26

## 2018-04-06 RX ADMIN — Medication 10 ML: at 20:40

## 2018-04-06 RX ADMIN — LISINOPRIL 5 MG: 5 TABLET ORAL at 09:27

## 2018-04-06 RX ADMIN — MAGNESIUM HYDROXIDE 30 ML: 400 SUSPENSION ORAL at 20:57

## 2018-04-06 RX ADMIN — FUROSEMIDE 20 MG: 20 TABLET ORAL at 14:51

## 2018-04-06 RX ADMIN — ASPIRIN 81 MG: 81 TABLET, CHEWABLE ORAL at 09:27

## 2018-04-06 RX ADMIN — TICAGRELOR 90 MG: 90 TABLET ORAL at 20:40

## 2018-04-06 RX ADMIN — RANOLAZINE 1000 MG: 500 TABLET, FILM COATED, EXTENDED RELEASE ORAL at 20:40

## 2018-04-06 RX ADMIN — Medication 10 ML: at 09:26

## 2018-04-06 RX ADMIN — LORAZEPAM 0.5 MG: 0.5 TABLET ORAL at 22:12

## 2018-04-07 LAB
ABSOLUTE EOS #: 0.24 K/UL (ref 0–0.44)
ABSOLUTE IMMATURE GRANULOCYTE: 0.03 K/UL (ref 0–0.3)
ABSOLUTE LYMPH #: 1.89 K/UL (ref 1.1–3.7)
ABSOLUTE MONO #: 0.93 K/UL (ref 0.1–1.2)
ALBUMIN SERPL-MCNC: 3.9 G/DL (ref 3.5–5.2)
ALBUMIN/GLOBULIN RATIO: 1.4 (ref 1–2.5)
ALP BLD-CCNC: 85 U/L (ref 35–104)
ALT SERPL-CCNC: 15 U/L (ref 5–33)
ANION GAP SERPL CALCULATED.3IONS-SCNC: 14 MMOL/L (ref 9–17)
AST SERPL-CCNC: 22 U/L
BASOPHILS # BLD: 1 % (ref 0–2)
BASOPHILS ABSOLUTE: 0.04 K/UL (ref 0–0.2)
BILIRUB SERPL-MCNC: 0.72 MG/DL (ref 0.3–1.2)
BUN BLDV-MCNC: 20 MG/DL (ref 8–23)
BUN/CREAT BLD: ABNORMAL (ref 9–20)
CALCIUM SERPL-MCNC: 9.5 MG/DL (ref 8.6–10.4)
CHLORIDE BLD-SCNC: 102 MMOL/L (ref 98–107)
CO2: 23 MMOL/L (ref 20–31)
CREAT SERPL-MCNC: 0.64 MG/DL (ref 0.5–0.9)
DIFFERENTIAL TYPE: ABNORMAL
EOSINOPHILS RELATIVE PERCENT: 3 % (ref 1–4)
GFR AFRICAN AMERICAN: >60 ML/MIN
GFR NON-AFRICAN AMERICAN: >60 ML/MIN
GFR SERPL CREATININE-BSD FRML MDRD: ABNORMAL ML/MIN/{1.73_M2}
GFR SERPL CREATININE-BSD FRML MDRD: ABNORMAL ML/MIN/{1.73_M2}
GLUCOSE BLD-MCNC: 110 MG/DL (ref 70–99)
HCT VFR BLD CALC: 39.2 % (ref 36.3–47.1)
HEMOGLOBIN: 12.7 G/DL (ref 11.9–15.1)
IMMATURE GRANULOCYTES: 0 %
LYMPHOCYTES # BLD: 22 % (ref 24–43)
MCH RBC QN AUTO: 29.7 PG (ref 25.2–33.5)
MCHC RBC AUTO-ENTMCNC: 32.4 G/DL (ref 28.4–34.8)
MCV RBC AUTO: 91.6 FL (ref 82.6–102.9)
MONOCYTES # BLD: 11 % (ref 3–12)
NRBC AUTOMATED: 0 PER 100 WBC
PDW BLD-RTO: 14.3 % (ref 11.8–14.4)
PLATELET # BLD: 308 K/UL (ref 138–453)
PLATELET ESTIMATE: ABNORMAL
PMV BLD AUTO: 11.4 FL (ref 8.1–13.5)
POTASSIUM SERPL-SCNC: 4.8 MMOL/L (ref 3.7–5.3)
RBC # BLD: 4.28 M/UL (ref 3.95–5.11)
RBC # BLD: ABNORMAL 10*6/UL
SEG NEUTROPHILS: 63 % (ref 36–65)
SEGMENTED NEUTROPHILS ABSOLUTE COUNT: 5.42 K/UL (ref 1.5–8.1)
SODIUM BLD-SCNC: 139 MMOL/L (ref 135–144)
TOTAL PROTEIN: 6.6 G/DL (ref 6.4–8.3)
WBC # BLD: 8.6 K/UL (ref 3.5–11.3)
WBC # BLD: ABNORMAL 10*3/UL

## 2018-04-07 PROCEDURE — 6370000000 HC RX 637 (ALT 250 FOR IP): Performed by: STUDENT IN AN ORGANIZED HEALTH CARE EDUCATION/TRAINING PROGRAM

## 2018-04-07 PROCEDURE — 99231 SBSQ HOSP IP/OBS SF/LOW 25: CPT | Performed by: HOSPITALIST

## 2018-04-07 PROCEDURE — 6360000002 HC RX W HCPCS: Performed by: STUDENT IN AN ORGANIZED HEALTH CARE EDUCATION/TRAINING PROGRAM

## 2018-04-07 PROCEDURE — 2060000000 HC ICU INTERMEDIATE R&B

## 2018-04-07 PROCEDURE — 6370000000 HC RX 637 (ALT 250 FOR IP): Performed by: NURSE PRACTITIONER

## 2018-04-07 PROCEDURE — 80053 COMPREHEN METABOLIC PANEL: CPT

## 2018-04-07 PROCEDURE — 6370000000 HC RX 637 (ALT 250 FOR IP): Performed by: INTERNAL MEDICINE

## 2018-04-07 PROCEDURE — 2580000003 HC RX 258: Performed by: INTERNAL MEDICINE

## 2018-04-07 PROCEDURE — 94762 N-INVAS EAR/PLS OXIMTRY CONT: CPT

## 2018-04-07 PROCEDURE — 36415 COLL VENOUS BLD VENIPUNCTURE: CPT

## 2018-04-07 PROCEDURE — 85025 COMPLETE CBC W/AUTO DIFF WBC: CPT

## 2018-04-07 PROCEDURE — 94618 PULMONARY STRESS TESTING: CPT

## 2018-04-07 RX ORDER — CARVEDILOL 3.12 MG/1
3.12 TABLET ORAL 2 TIMES DAILY WITH MEALS
Status: DISCONTINUED | OUTPATIENT
Start: 2018-04-07 | End: 2018-04-08 | Stop reason: HOSPADM

## 2018-04-07 RX ADMIN — FUROSEMIDE 20 MG: 20 TABLET ORAL at 08:59

## 2018-04-07 RX ADMIN — Medication 10 ML: at 20:35

## 2018-04-07 RX ADMIN — ASPIRIN 81 MG: 81 TABLET, CHEWABLE ORAL at 08:59

## 2018-04-07 RX ADMIN — RANOLAZINE 1000 MG: 500 TABLET, FILM COATED, EXTENDED RELEASE ORAL at 08:59

## 2018-04-07 RX ADMIN — RANOLAZINE 1000 MG: 500 TABLET, FILM COATED, EXTENDED RELEASE ORAL at 20:35

## 2018-04-07 RX ADMIN — TICAGRELOR 90 MG: 90 TABLET ORAL at 08:59

## 2018-04-07 RX ADMIN — Medication 10 ML: at 10:05

## 2018-04-07 RX ADMIN — MAGNESIUM HYDROXIDE 30 ML: 400 SUSPENSION ORAL at 18:37

## 2018-04-07 RX ADMIN — ATORVASTATIN CALCIUM 80 MG: 80 TABLET, FILM COATED ORAL at 20:35

## 2018-04-07 RX ADMIN — CARVEDILOL 3.12 MG: 3.12 TABLET, FILM COATED ORAL at 18:37

## 2018-04-07 RX ADMIN — LISINOPRIL 5 MG: 5 TABLET ORAL at 08:59

## 2018-04-07 RX ADMIN — ONDANSETRON 4 MG: 2 INJECTION INTRAMUSCULAR; INTRAVENOUS at 06:38

## 2018-04-07 RX ADMIN — CARVEDILOL 6.25 MG: 6.25 TABLET, FILM COATED ORAL at 08:59

## 2018-04-07 RX ADMIN — TICAGRELOR 90 MG: 90 TABLET ORAL at 20:35

## 2018-04-07 ASSESSMENT — ENCOUNTER SYMPTOMS
GASTROINTESTINAL NEGATIVE: 1
EYES NEGATIVE: 1
RESPIRATORY NEGATIVE: 1

## 2018-04-07 ASSESSMENT — PAIN SCALES - GENERAL: PAINLEVEL_OUTOF10: 0

## 2018-04-08 VITALS
DIASTOLIC BLOOD PRESSURE: 63 MMHG | HEART RATE: 92 BPM | SYSTOLIC BLOOD PRESSURE: 104 MMHG | HEIGHT: 61 IN | OXYGEN SATURATION: 95 % | TEMPERATURE: 97.9 F | WEIGHT: 167.6 LBS | RESPIRATION RATE: 24 BRPM | BODY MASS INDEX: 31.64 KG/M2

## 2018-04-08 LAB
ABSOLUTE EOS #: 0.21 K/UL (ref 0–0.44)
ABSOLUTE IMMATURE GRANULOCYTE: 0.04 K/UL (ref 0–0.3)
ABSOLUTE LYMPH #: 1.76 K/UL (ref 1.1–3.7)
ABSOLUTE MONO #: 0.9 K/UL (ref 0.1–1.2)
ALBUMIN SERPL-MCNC: 4.1 G/DL (ref 3.5–5.2)
ALBUMIN/GLOBULIN RATIO: 1.4 (ref 1–2.5)
ALP BLD-CCNC: 85 U/L (ref 35–104)
ALT SERPL-CCNC: 22 U/L (ref 5–33)
ANION GAP SERPL CALCULATED.3IONS-SCNC: 13 MMOL/L (ref 9–17)
AST SERPL-CCNC: 25 U/L
BASOPHILS # BLD: 1 % (ref 0–2)
BASOPHILS ABSOLUTE: 0.04 K/UL (ref 0–0.2)
BILIRUB SERPL-MCNC: 0.96 MG/DL (ref 0.3–1.2)
BUN BLDV-MCNC: 27 MG/DL (ref 8–23)
BUN/CREAT BLD: ABNORMAL (ref 9–20)
CALCIUM SERPL-MCNC: 9.5 MG/DL (ref 8.6–10.4)
CHLORIDE BLD-SCNC: 99 MMOL/L (ref 98–107)
CO2: 25 MMOL/L (ref 20–31)
CREAT SERPL-MCNC: 0.77 MG/DL (ref 0.5–0.9)
DIFFERENTIAL TYPE: ABNORMAL
EOSINOPHILS RELATIVE PERCENT: 3 % (ref 1–4)
GFR AFRICAN AMERICAN: >60 ML/MIN
GFR NON-AFRICAN AMERICAN: >60 ML/MIN
GFR SERPL CREATININE-BSD FRML MDRD: ABNORMAL ML/MIN/{1.73_M2}
GFR SERPL CREATININE-BSD FRML MDRD: ABNORMAL ML/MIN/{1.73_M2}
GLUCOSE BLD-MCNC: 121 MG/DL (ref 70–99)
HCT VFR BLD CALC: 40.7 % (ref 36.3–47.1)
HEMOGLOBIN: 13 G/DL (ref 11.9–15.1)
IMMATURE GRANULOCYTES: 1 %
LYMPHOCYTES # BLD: 21 % (ref 24–43)
MCH RBC QN AUTO: 29.7 PG (ref 25.2–33.5)
MCHC RBC AUTO-ENTMCNC: 31.9 G/DL (ref 28.4–34.8)
MCV RBC AUTO: 92.9 FL (ref 82.6–102.9)
MONOCYTES # BLD: 11 % (ref 3–12)
NRBC AUTOMATED: 0 PER 100 WBC
PDW BLD-RTO: 14 % (ref 11.8–14.4)
PLATELET # BLD: 215 K/UL (ref 138–453)
PLATELET ESTIMATE: ABNORMAL
PMV BLD AUTO: 10.1 FL (ref 8.1–13.5)
POTASSIUM SERPL-SCNC: 4.4 MMOL/L (ref 3.7–5.3)
RBC # BLD: 4.38 M/UL (ref 3.95–5.11)
RBC # BLD: ABNORMAL 10*6/UL
SEG NEUTROPHILS: 63 % (ref 36–65)
SEGMENTED NEUTROPHILS ABSOLUTE COUNT: 5.62 K/UL (ref 1.5–8.1)
SODIUM BLD-SCNC: 137 MMOL/L (ref 135–144)
TOTAL PROTEIN: 7 G/DL (ref 6.4–8.3)
WBC # BLD: 8.6 K/UL (ref 3.5–11.3)
WBC # BLD: ABNORMAL 10*3/UL

## 2018-04-08 PROCEDURE — 80053 COMPREHEN METABOLIC PANEL: CPT

## 2018-04-08 PROCEDURE — 6370000000 HC RX 637 (ALT 250 FOR IP): Performed by: INTERNAL MEDICINE

## 2018-04-08 PROCEDURE — 36415 COLL VENOUS BLD VENIPUNCTURE: CPT

## 2018-04-08 PROCEDURE — 2580000003 HC RX 258: Performed by: INTERNAL MEDICINE

## 2018-04-08 PROCEDURE — 85025 COMPLETE CBC W/AUTO DIFF WBC: CPT

## 2018-04-08 PROCEDURE — 6370000000 HC RX 637 (ALT 250 FOR IP): Performed by: STUDENT IN AN ORGANIZED HEALTH CARE EDUCATION/TRAINING PROGRAM

## 2018-04-08 PROCEDURE — 99238 HOSP IP/OBS DSCHRG MGMT 30/<: CPT | Performed by: HOSPITALIST

## 2018-04-08 PROCEDURE — 6360000002 HC RX W HCPCS: Performed by: FAMILY MEDICINE

## 2018-04-08 PROCEDURE — 94762 N-INVAS EAR/PLS OXIMTRY CONT: CPT

## 2018-04-08 PROCEDURE — 6370000000 HC RX 637 (ALT 250 FOR IP): Performed by: NURSE PRACTITIONER

## 2018-04-08 RX ORDER — ONDANSETRON 4 MG/1
4 TABLET, FILM COATED ORAL EVERY 8 HOURS PRN
Qty: 20 TABLET | Refills: 0 | Status: SHIPPED | OUTPATIENT
Start: 2018-04-08

## 2018-04-08 RX ORDER — CARVEDILOL 3.12 MG/1
3.12 TABLET ORAL 2 TIMES DAILY WITH MEALS
Qty: 60 TABLET | Refills: 3 | Status: SHIPPED | OUTPATIENT
Start: 2018-04-08 | End: 2018-04-20

## 2018-04-08 RX ORDER — FUROSEMIDE 20 MG/1
20 TABLET ORAL DAILY
Qty: 60 TABLET | Refills: 3 | Status: SHIPPED | OUTPATIENT
Start: 2018-04-09 | End: 2018-04-20

## 2018-04-08 RX ORDER — ONDANSETRON 4 MG/1
4 TABLET, FILM COATED ORAL ONCE
Status: COMPLETED | OUTPATIENT
Start: 2018-04-08 | End: 2018-04-08

## 2018-04-08 RX ADMIN — ONDANSETRON HYDROCHLORIDE 4 MG: 4 TABLET, FILM COATED ORAL at 12:41

## 2018-04-08 RX ADMIN — TICAGRELOR 90 MG: 90 TABLET ORAL at 08:22

## 2018-04-08 RX ADMIN — CARVEDILOL 3.12 MG: 3.12 TABLET, FILM COATED ORAL at 08:22

## 2018-04-08 RX ADMIN — ASPIRIN 81 MG: 81 TABLET, CHEWABLE ORAL at 08:22

## 2018-04-08 RX ADMIN — Medication 10 ML: at 08:22

## 2018-04-08 RX ADMIN — FUROSEMIDE 20 MG: 20 TABLET ORAL at 08:22

## 2018-04-08 RX ADMIN — RANOLAZINE 1000 MG: 500 TABLET, FILM COATED, EXTENDED RELEASE ORAL at 08:22

## 2018-04-08 RX ADMIN — LISINOPRIL 5 MG: 5 TABLET ORAL at 08:22

## 2018-04-08 ASSESSMENT — PAIN SCALES - GENERAL: PAINLEVEL_OUTOF10: 0

## 2018-04-09 ENCOUNTER — CARE COORDINATION (OUTPATIENT)
Dept: CASE MANAGEMENT | Age: 63
End: 2018-04-09

## 2018-04-09 DIAGNOSIS — I21.4 NSTEMI (NON-ST ELEVATED MYOCARDIAL INFARCTION) (HCC): ICD-10-CM

## 2018-04-09 DIAGNOSIS — Z95.5 S/P DRUG ELUTING CORONARY STENT PLACEMENT: Primary | ICD-10-CM

## 2018-04-09 PROCEDURE — 1111F DSCHRG MED/CURRENT MED MERGE: CPT | Performed by: STUDENT IN AN ORGANIZED HEALTH CARE EDUCATION/TRAINING PROGRAM

## 2018-04-10 ENCOUNTER — APPOINTMENT (OUTPATIENT)
Dept: GENERAL RADIOLOGY | Age: 63
DRG: 281 | End: 2018-04-10
Payer: MEDICARE

## 2018-04-10 ENCOUNTER — HOSPITAL ENCOUNTER (INPATIENT)
Age: 63
LOS: 2 days | Discharge: HOME | DRG: 281 | End: 2018-04-13
Attending: EMERGENCY MEDICINE | Admitting: FAMILY MEDICINE
Payer: MEDICARE

## 2018-04-10 DIAGNOSIS — I21.4 NSTEMI (NON-ST ELEVATED MYOCARDIAL INFARCTION) (HCC): Primary | ICD-10-CM

## 2018-04-10 LAB
ABSOLUTE EOS #: 0.26 K/UL (ref 0–0.44)
ABSOLUTE IMMATURE GRANULOCYTE: 0.05 K/UL (ref 0–0.3)
ABSOLUTE LYMPH #: 2.71 K/UL (ref 1.1–3.7)
ABSOLUTE MONO #: 0.85 K/UL (ref 0.1–1.2)
ANION GAP SERPL CALCULATED.3IONS-SCNC: 15 MMOL/L (ref 9–17)
BASOPHILS # BLD: 0 % (ref 0–2)
BASOPHILS ABSOLUTE: 0.04 K/UL (ref 0–0.2)
BUN BLDV-MCNC: 28 MG/DL (ref 8–23)
BUN/CREAT BLD: ABNORMAL (ref 9–20)
CALCIUM SERPL-MCNC: 9.2 MG/DL (ref 8.6–10.4)
CHLORIDE BLD-SCNC: 98 MMOL/L (ref 98–107)
CO2: 21 MMOL/L (ref 20–31)
CREAT SERPL-MCNC: 0.66 MG/DL (ref 0.5–0.9)
DIFFERENTIAL TYPE: ABNORMAL
EKG ATRIAL RATE: 72 BPM
EKG P AXIS: 22 DEGREES
EKG P-R INTERVAL: 114 MS
EKG Q-T INTERVAL: 434 MS
EKG QRS DURATION: 116 MS
EKG QTC CALCULATION (BAZETT): 475 MS
EKG R AXIS: -30 DEGREES
EKG T AXIS: 37 DEGREES
EKG VENTRICULAR RATE: 72 BPM
EOSINOPHILS RELATIVE PERCENT: 3 % (ref 1–4)
GFR AFRICAN AMERICAN: >60 ML/MIN
GFR NON-AFRICAN AMERICAN: >60 ML/MIN
GFR SERPL CREATININE-BSD FRML MDRD: ABNORMAL ML/MIN/{1.73_M2}
GFR SERPL CREATININE-BSD FRML MDRD: ABNORMAL ML/MIN/{1.73_M2}
GLUCOSE BLD-MCNC: 91 MG/DL (ref 70–99)
HCT VFR BLD CALC: 42.8 % (ref 36.3–47.1)
HEMOGLOBIN: 13.9 G/DL (ref 11.9–15.1)
IMMATURE GRANULOCYTES: 1 %
LYMPHOCYTES # BLD: 30 % (ref 24–43)
MCH RBC QN AUTO: 30.1 PG (ref 25.2–33.5)
MCHC RBC AUTO-ENTMCNC: 32.5 G/DL (ref 28.4–34.8)
MCV RBC AUTO: 92.6 FL (ref 82.6–102.9)
MONOCYTES # BLD: 9 % (ref 3–12)
NRBC AUTOMATED: 0 PER 100 WBC
PDW BLD-RTO: 13.5 % (ref 11.8–14.4)
PLATELET # BLD: 261 K/UL (ref 138–453)
PLATELET ESTIMATE: ABNORMAL
PMV BLD AUTO: 10.2 FL (ref 8.1–13.5)
POC TROPONIN I: 0.1 NG/ML (ref 0–0.1)
POC TROPONIN I: 0.12 NG/ML (ref 0–0.1)
POC TROPONIN INTERP: ABNORMAL
POC TROPONIN INTERP: NORMAL
POTASSIUM SERPL-SCNC: 4.3 MMOL/L (ref 3.7–5.3)
RBC # BLD: 4.62 M/UL (ref 3.95–5.11)
RBC # BLD: ABNORMAL 10*6/UL
SEG NEUTROPHILS: 57 % (ref 36–65)
SEGMENTED NEUTROPHILS ABSOLUTE COUNT: 5.14 K/UL (ref 1.5–8.1)
SODIUM BLD-SCNC: 134 MMOL/L (ref 135–144)
TROPONIN INTERP: ABNORMAL
TROPONIN T: 0.08 NG/ML
WBC # BLD: 9.1 K/UL (ref 3.5–11.3)
WBC # BLD: ABNORMAL 10*3/UL

## 2018-04-10 PROCEDURE — 84484 ASSAY OF TROPONIN QUANT: CPT

## 2018-04-10 PROCEDURE — 85025 COMPLETE CBC W/AUTO DIFF WBC: CPT

## 2018-04-10 PROCEDURE — 80048 BASIC METABOLIC PNL TOTAL CA: CPT

## 2018-04-10 PROCEDURE — 99285 EMERGENCY DEPT VISIT HI MDM: CPT

## 2018-04-10 PROCEDURE — 93005 ELECTROCARDIOGRAM TRACING: CPT

## 2018-04-10 PROCEDURE — 71045 X-RAY EXAM CHEST 1 VIEW: CPT

## 2018-04-10 RX ORDER — ASPIRIN 81 MG/1
324 TABLET, CHEWABLE ORAL ONCE
Status: COMPLETED | OUTPATIENT
Start: 2018-04-11 | End: 2018-04-11

## 2018-04-10 RX ORDER — NITROGLYCERIN 0.4 MG/1
0.4 TABLET SUBLINGUAL ONCE
Status: COMPLETED | OUTPATIENT
Start: 2018-04-11 | End: 2018-04-11

## 2018-04-10 ASSESSMENT — PAIN DESCRIPTION - ONSET: ONSET: PROGRESSIVE

## 2018-04-10 ASSESSMENT — PAIN DESCRIPTION - FREQUENCY: FREQUENCY: CONTINUOUS

## 2018-04-10 ASSESSMENT — PAIN DESCRIPTION - LOCATION: LOCATION: CHEST

## 2018-04-10 ASSESSMENT — PAIN DESCRIPTION - ORIENTATION: ORIENTATION: MID

## 2018-04-10 ASSESSMENT — PAIN DESCRIPTION - DESCRIPTORS: DESCRIPTORS: TIGHTNESS

## 2018-04-10 ASSESSMENT — PAIN DESCRIPTION - PAIN TYPE: TYPE: ACUTE PAIN

## 2018-04-10 ASSESSMENT — PAIN SCALES - GENERAL: PAINLEVEL_OUTOF10: 3

## 2018-04-11 LAB
TROPONIN INTERP: ABNORMAL
TROPONIN T: 0.04 NG/ML
TROPONIN T: 0.05 NG/ML
TROPONIN T: 0.1 NG/ML

## 2018-04-11 PROCEDURE — 6360000002 HC RX W HCPCS: Performed by: EMERGENCY MEDICINE

## 2018-04-11 PROCEDURE — 6370000000 HC RX 637 (ALT 250 FOR IP): Performed by: NURSE PRACTITIONER

## 2018-04-11 PROCEDURE — 84484 ASSAY OF TROPONIN QUANT: CPT

## 2018-04-11 PROCEDURE — 2060000000 HC ICU INTERMEDIATE R&B

## 2018-04-11 PROCEDURE — 6370000000 HC RX 637 (ALT 250 FOR IP): Performed by: STUDENT IN AN ORGANIZED HEALTH CARE EDUCATION/TRAINING PROGRAM

## 2018-04-11 PROCEDURE — 6360000002 HC RX W HCPCS: Performed by: NURSE PRACTITIONER

## 2018-04-11 PROCEDURE — 2580000003 HC RX 258: Performed by: NURSE PRACTITIONER

## 2018-04-11 PROCEDURE — 6370000000 HC RX 637 (ALT 250 FOR IP): Performed by: EMERGENCY MEDICINE

## 2018-04-11 PROCEDURE — 36415 COLL VENOUS BLD VENIPUNCTURE: CPT

## 2018-04-11 PROCEDURE — 93005 ELECTROCARDIOGRAM TRACING: CPT

## 2018-04-11 PROCEDURE — 99222 1ST HOSP IP/OBS MODERATE 55: CPT | Performed by: FAMILY MEDICINE

## 2018-04-11 RX ORDER — ACETAMINOPHEN 325 MG/1
650 TABLET ORAL EVERY 4 HOURS PRN
Status: DISCONTINUED | OUTPATIENT
Start: 2018-04-11 | End: 2018-04-13 | Stop reason: HOSPADM

## 2018-04-11 RX ORDER — POTASSIUM CHLORIDE 20MEQ/15ML
40 LIQUID (ML) ORAL PRN
Status: DISCONTINUED | OUTPATIENT
Start: 2018-04-11 | End: 2018-04-13 | Stop reason: HOSPADM

## 2018-04-11 RX ORDER — CARVEDILOL 3.12 MG/1
3.12 TABLET ORAL 2 TIMES DAILY WITH MEALS
Status: DISCONTINUED | OUTPATIENT
Start: 2018-04-11 | End: 2018-04-11

## 2018-04-11 RX ORDER — POTASSIUM CHLORIDE 7.45 MG/ML
10 INJECTION INTRAVENOUS PRN
Status: DISCONTINUED | OUTPATIENT
Start: 2018-04-11 | End: 2018-04-13 | Stop reason: HOSPADM

## 2018-04-11 RX ORDER — HYDROCODONE BITARTRATE AND ACETAMINOPHEN 5; 325 MG/1; MG/1
1 TABLET ORAL EVERY 4 HOURS PRN
Status: DISCONTINUED | OUTPATIENT
Start: 2018-04-11 | End: 2018-04-13

## 2018-04-11 RX ORDER — HYDRALAZINE HYDROCHLORIDE 20 MG/ML
5 INJECTION INTRAMUSCULAR; INTRAVENOUS EVERY 6 HOURS PRN
Status: DISCONTINUED | OUTPATIENT
Start: 2018-04-11 | End: 2018-04-13 | Stop reason: HOSPADM

## 2018-04-11 RX ORDER — SODIUM CHLORIDE 0.9 % (FLUSH) 0.9 %
10 SYRINGE (ML) INJECTION PRN
Status: DISCONTINUED | OUTPATIENT
Start: 2018-04-11 | End: 2018-04-13 | Stop reason: HOSPADM

## 2018-04-11 RX ORDER — ATORVASTATIN CALCIUM 80 MG/1
80 TABLET, FILM COATED ORAL NIGHTLY
Status: DISCONTINUED | OUTPATIENT
Start: 2018-04-11 | End: 2018-04-13 | Stop reason: HOSPADM

## 2018-04-11 RX ORDER — MAGNESIUM SULFATE 1 G/100ML
1 INJECTION INTRAVENOUS PRN
Status: DISCONTINUED | OUTPATIENT
Start: 2018-04-11 | End: 2018-04-13 | Stop reason: HOSPADM

## 2018-04-11 RX ORDER — FAMOTIDINE 20 MG/1
20 TABLET, FILM COATED ORAL 2 TIMES DAILY
Status: DISCONTINUED | OUTPATIENT
Start: 2018-04-11 | End: 2018-04-13 | Stop reason: HOSPADM

## 2018-04-11 RX ORDER — LISINOPRIL 2.5 MG/1
2.5 TABLET ORAL DAILY
Status: DISCONTINUED | OUTPATIENT
Start: 2018-04-11 | End: 2018-04-13 | Stop reason: HOSPADM

## 2018-04-11 RX ORDER — FUROSEMIDE 20 MG/1
20 TABLET ORAL DAILY
Status: DISCONTINUED | OUTPATIENT
Start: 2018-04-11 | End: 2018-04-13 | Stop reason: HOSPADM

## 2018-04-11 RX ORDER — SODIUM CHLORIDE 0.9 % (FLUSH) 0.9 %
10 SYRINGE (ML) INJECTION EVERY 12 HOURS SCHEDULED
Status: DISCONTINUED | OUTPATIENT
Start: 2018-04-11 | End: 2018-04-13 | Stop reason: HOSPADM

## 2018-04-11 RX ORDER — ASPIRIN 81 MG/1
81 TABLET ORAL DAILY
Status: DISCONTINUED | OUTPATIENT
Start: 2018-04-11 | End: 2018-04-13 | Stop reason: HOSPADM

## 2018-04-11 RX ORDER — BISACODYL 10 MG
10 SUPPOSITORY, RECTAL RECTAL DAILY PRN
Status: DISCONTINUED | OUTPATIENT
Start: 2018-04-11 | End: 2018-04-13 | Stop reason: HOSPADM

## 2018-04-11 RX ORDER — POTASSIUM CHLORIDE 20 MEQ/1
40 TABLET, EXTENDED RELEASE ORAL PRN
Status: DISCONTINUED | OUTPATIENT
Start: 2018-04-11 | End: 2018-04-13 | Stop reason: HOSPADM

## 2018-04-11 RX ORDER — NICOTINE 21 MG/24HR
1 PATCH, TRANSDERMAL 24 HOURS TRANSDERMAL DAILY
Status: DISCONTINUED | OUTPATIENT
Start: 2018-04-11 | End: 2018-04-13 | Stop reason: HOSPADM

## 2018-04-11 RX ORDER — ONDANSETRON 4 MG/1
4 TABLET, FILM COATED ORAL ONCE
Status: COMPLETED | OUTPATIENT
Start: 2018-04-11 | End: 2018-04-11

## 2018-04-11 RX ORDER — MORPHINE SULFATE 4 MG/ML
4 INJECTION, SOLUTION INTRAMUSCULAR; INTRAVENOUS
Status: DISCONTINUED | OUTPATIENT
Start: 2018-04-11 | End: 2018-04-13 | Stop reason: HOSPADM

## 2018-04-11 RX ORDER — MORPHINE SULFATE 2 MG/ML
2 INJECTION, SOLUTION INTRAMUSCULAR; INTRAVENOUS
Status: DISCONTINUED | OUTPATIENT
Start: 2018-04-11 | End: 2018-04-13 | Stop reason: HOSPADM

## 2018-04-11 RX ORDER — ONDANSETRON 2 MG/ML
4 INJECTION INTRAMUSCULAR; INTRAVENOUS EVERY 6 HOURS PRN
Status: DISCONTINUED | OUTPATIENT
Start: 2018-04-11 | End: 2018-04-13 | Stop reason: HOSPADM

## 2018-04-11 RX ORDER — NITROGLYCERIN 0.4 MG/1
0.4 TABLET SUBLINGUAL EVERY 5 MIN PRN
Status: DISCONTINUED | OUTPATIENT
Start: 2018-04-11 | End: 2018-04-13 | Stop reason: HOSPADM

## 2018-04-11 RX ORDER — RANOLAZINE 500 MG/1
1000 TABLET, EXTENDED RELEASE ORAL 2 TIMES DAILY
Status: DISCONTINUED | OUTPATIENT
Start: 2018-04-11 | End: 2018-04-13 | Stop reason: HOSPADM

## 2018-04-11 RX ORDER — UREA 10 %
1 LOTION (ML) TOPICAL NIGHTLY PRN
Status: DISCONTINUED | OUTPATIENT
Start: 2018-04-11 | End: 2018-04-13 | Stop reason: HOSPADM

## 2018-04-11 RX ORDER — DOCUSATE SODIUM 100 MG/1
100 CAPSULE, LIQUID FILLED ORAL 2 TIMES DAILY
Status: DISCONTINUED | OUTPATIENT
Start: 2018-04-11 | End: 2018-04-13 | Stop reason: HOSPADM

## 2018-04-11 RX ADMIN — NITROGLYCERIN 0.4 MG: 0.4 TABLET SUBLINGUAL at 00:30

## 2018-04-11 RX ADMIN — FAMOTIDINE 20 MG: 20 TABLET, FILM COATED ORAL at 20:50

## 2018-04-11 RX ADMIN — ONDANSETRON 4 MG: 2 INJECTION INTRAMUSCULAR; INTRAVENOUS at 23:40

## 2018-04-11 RX ADMIN — Medication 1 MG: at 20:49

## 2018-04-11 RX ADMIN — Medication 10 ML: at 20:50

## 2018-04-11 RX ADMIN — RANOLAZINE 1000 MG: 500 TABLET, FILM COATED, EXTENDED RELEASE ORAL at 20:49

## 2018-04-11 RX ADMIN — TICAGRELOR 90 MG: 90 TABLET ORAL at 20:49

## 2018-04-11 RX ADMIN — ASPIRIN 81 MG 324 MG: 81 TABLET ORAL at 00:38

## 2018-04-11 RX ADMIN — ATORVASTATIN CALCIUM 80 MG: 80 TABLET, FILM COATED ORAL at 20:49

## 2018-04-11 RX ADMIN — DOCUSATE SODIUM 100 MG: 100 CAPSULE ORAL at 20:50

## 2018-04-11 RX ADMIN — LISINOPRIL 2.5 MG: 2.5 TABLET ORAL at 18:32

## 2018-04-11 RX ADMIN — FUROSEMIDE 20 MG: 20 TABLET ORAL at 18:31

## 2018-04-11 RX ADMIN — ASPIRIN 81 MG: 81 TABLET, COATED ORAL at 18:32

## 2018-04-11 RX ADMIN — ONDANSETRON HYDROCHLORIDE 4 MG: 4 TABLET, FILM COATED ORAL at 00:38

## 2018-04-11 ASSESSMENT — ENCOUNTER SYMPTOMS
VOMITING: 0
DIARRHEA: 0
CONSTIPATION: 0
SHORTNESS OF BREATH: 1
SORE THROAT: 0
NAUSEA: 0
CHEST TIGHTNESS: 0
ABDOMINAL PAIN: 0

## 2018-04-11 ASSESSMENT — PAIN SCALES - GENERAL: PAINLEVEL_OUTOF10: 0

## 2018-04-12 LAB
ALBUMIN SERPL-MCNC: 3.9 G/DL (ref 3.5–5.2)
ALBUMIN/GLOBULIN RATIO: 1.3 (ref 1–2.5)
ALP BLD-CCNC: 90 U/L (ref 35–104)
ALT SERPL-CCNC: 16 U/L (ref 5–33)
ANION GAP SERPL CALCULATED.3IONS-SCNC: 17 MMOL/L (ref 9–17)
AST SERPL-CCNC: 17 U/L
BILIRUB SERPL-MCNC: 0.94 MG/DL (ref 0.3–1.2)
BUN BLDV-MCNC: 22 MG/DL (ref 8–23)
BUN/CREAT BLD: NORMAL (ref 9–20)
CALCIUM SERPL-MCNC: 9.6 MG/DL (ref 8.6–10.4)
CHLORIDE BLD-SCNC: 101 MMOL/L (ref 98–107)
CHOLESTEROL/HDL RATIO: 3.5
CHOLESTEROL: 152 MG/DL
CO2: 20 MMOL/L (ref 20–31)
CREAT SERPL-MCNC: 0.63 MG/DL (ref 0.5–0.9)
EKG ATRIAL RATE: 82 BPM
EKG P AXIS: 23 DEGREES
EKG P-R INTERVAL: 138 MS
EKG Q-T INTERVAL: 448 MS
EKG QRS DURATION: 130 MS
EKG QTC CALCULATION (BAZETT): 523 MS
EKG R AXIS: -16 DEGREES
EKG T AXIS: 59 DEGREES
EKG VENTRICULAR RATE: 82 BPM
GFR AFRICAN AMERICAN: >60 ML/MIN
GFR NON-AFRICAN AMERICAN: >60 ML/MIN
GFR SERPL CREATININE-BSD FRML MDRD: NORMAL ML/MIN/{1.73_M2}
GFR SERPL CREATININE-BSD FRML MDRD: NORMAL ML/MIN/{1.73_M2}
GLUCOSE BLD-MCNC: 146 MG/DL (ref 65–105)
GLUCOSE BLD-MCNC: 98 MG/DL (ref 70–99)
HCT VFR BLD CALC: 42.5 % (ref 36.3–47.1)
HDLC SERPL-MCNC: 44 MG/DL
HEMOGLOBIN: 13.3 G/DL (ref 11.9–15.1)
LDL CHOLESTEROL: 87 MG/DL (ref 0–130)
MAGNESIUM: 2.3 MG/DL (ref 1.6–2.6)
MCH RBC QN AUTO: 29.7 PG (ref 25.2–33.5)
MCHC RBC AUTO-ENTMCNC: 31.3 G/DL (ref 28.4–34.8)
MCV RBC AUTO: 94.9 FL (ref 82.6–102.9)
MYOGLOBIN: <21 NG/ML (ref 25–58)
NRBC AUTOMATED: 0 PER 100 WBC
PDW BLD-RTO: 13.5 % (ref 11.8–14.4)
PLATELET # BLD: 244 K/UL (ref 138–453)
PMV BLD AUTO: 9.9 FL (ref 8.1–13.5)
POTASSIUM SERPL-SCNC: 4.4 MMOL/L (ref 3.7–5.3)
RBC # BLD: 4.48 M/UL (ref 3.95–5.11)
SODIUM BLD-SCNC: 138 MMOL/L (ref 135–144)
TOTAL PROTEIN: 6.8 G/DL (ref 6.4–8.3)
TRIGL SERPL-MCNC: 104 MG/DL
TROPONIN INTERP: ABNORMAL
TROPONIN T: <0.03 NG/ML
TSH SERPL DL<=0.05 MIU/L-ACNC: 1.98 MIU/L (ref 0.3–5)
VLDLC SERPL CALC-MCNC: NORMAL MG/DL (ref 1–30)
WBC # BLD: 8.8 K/UL (ref 3.5–11.3)

## 2018-04-12 PROCEDURE — 2060000000 HC ICU INTERMEDIATE R&B

## 2018-04-12 PROCEDURE — 80061 LIPID PANEL: CPT

## 2018-04-12 PROCEDURE — 85027 COMPLETE CBC AUTOMATED: CPT

## 2018-04-12 PROCEDURE — 84484 ASSAY OF TROPONIN QUANT: CPT

## 2018-04-12 PROCEDURE — 2580000003 HC RX 258: Performed by: STUDENT IN AN ORGANIZED HEALTH CARE EDUCATION/TRAINING PROGRAM

## 2018-04-12 PROCEDURE — 84443 ASSAY THYROID STIM HORMONE: CPT

## 2018-04-12 PROCEDURE — 6370000000 HC RX 637 (ALT 250 FOR IP): Performed by: STUDENT IN AN ORGANIZED HEALTH CARE EDUCATION/TRAINING PROGRAM

## 2018-04-12 PROCEDURE — 2580000003 HC RX 258: Performed by: NURSE PRACTITIONER

## 2018-04-12 PROCEDURE — 36415 COLL VENOUS BLD VENIPUNCTURE: CPT

## 2018-04-12 PROCEDURE — 93005 ELECTROCARDIOGRAM TRACING: CPT

## 2018-04-12 PROCEDURE — 99232 SBSQ HOSP IP/OBS MODERATE 35: CPT | Performed by: FAMILY MEDICINE

## 2018-04-12 PROCEDURE — 94762 N-INVAS EAR/PLS OXIMTRY CONT: CPT

## 2018-04-12 PROCEDURE — 6370000000 HC RX 637 (ALT 250 FOR IP): Performed by: NURSE PRACTITIONER

## 2018-04-12 PROCEDURE — 83874 ASSAY OF MYOGLOBIN: CPT

## 2018-04-12 PROCEDURE — 82947 ASSAY GLUCOSE BLOOD QUANT: CPT

## 2018-04-12 PROCEDURE — 83735 ASSAY OF MAGNESIUM: CPT

## 2018-04-12 PROCEDURE — 6370000000 HC RX 637 (ALT 250 FOR IP): Performed by: INTERNAL MEDICINE

## 2018-04-12 PROCEDURE — 80053 COMPREHEN METABOLIC PANEL: CPT

## 2018-04-12 RX ORDER — SODIUM CHLORIDE 9 MG/ML
INJECTION, SOLUTION INTRAVENOUS CONTINUOUS
Status: ACTIVE | OUTPATIENT
Start: 2018-04-12 | End: 2018-04-12

## 2018-04-12 RX ORDER — ISOSORBIDE MONONITRATE 30 MG/1
30 TABLET, EXTENDED RELEASE ORAL DAILY
Status: DISCONTINUED | OUTPATIENT
Start: 2018-04-12 | End: 2018-04-12

## 2018-04-12 RX ORDER — CLOPIDOGREL BISULFATE 75 MG/1
300 TABLET ORAL ONCE
Status: COMPLETED | OUTPATIENT
Start: 2018-04-12 | End: 2018-04-12

## 2018-04-12 RX ORDER — CLOPIDOGREL BISULFATE 75 MG/1
75 TABLET ORAL DAILY
Status: DISCONTINUED | OUTPATIENT
Start: 2018-04-13 | End: 2018-04-13 | Stop reason: HOSPADM

## 2018-04-12 RX ADMIN — LISINOPRIL 2.5 MG: 2.5 TABLET ORAL at 08:35

## 2018-04-12 RX ADMIN — DOCUSATE SODIUM 100 MG: 100 CAPSULE ORAL at 08:35

## 2018-04-12 RX ADMIN — ASPIRIN 81 MG: 81 TABLET, COATED ORAL at 08:36

## 2018-04-12 RX ADMIN — Medication 1 MG: at 20:28

## 2018-04-12 RX ADMIN — FAMOTIDINE 20 MG: 20 TABLET, FILM COATED ORAL at 08:35

## 2018-04-12 RX ADMIN — Medication 10 ML: at 08:36

## 2018-04-12 RX ADMIN — FUROSEMIDE 20 MG: 20 TABLET ORAL at 08:35

## 2018-04-12 RX ADMIN — CLOPIDOGREL 300 MG: 75 TABLET, FILM COATED ORAL at 12:44

## 2018-04-12 RX ADMIN — Medication 10 ML: at 20:28

## 2018-04-12 RX ADMIN — RANOLAZINE 1000 MG: 500 TABLET, FILM COATED, EXTENDED RELEASE ORAL at 08:36

## 2018-04-12 RX ADMIN — SODIUM CHLORIDE 250 ML/HR: 9 INJECTION, SOLUTION INTRAVENOUS at 18:52

## 2018-04-12 RX ADMIN — FAMOTIDINE 20 MG: 20 TABLET, FILM COATED ORAL at 20:28

## 2018-04-12 RX ADMIN — ISOSORBIDE MONONITRATE 30 MG: 30 TABLET ORAL at 12:44

## 2018-04-12 RX ADMIN — ATORVASTATIN CALCIUM 80 MG: 80 TABLET, FILM COATED ORAL at 20:28

## 2018-04-12 RX ADMIN — DOCUSATE SODIUM 100 MG: 100 CAPSULE ORAL at 20:28

## 2018-04-13 VITALS
DIASTOLIC BLOOD PRESSURE: 99 MMHG | OXYGEN SATURATION: 97 % | TEMPERATURE: 97.7 F | HEIGHT: 60 IN | WEIGHT: 172.62 LBS | RESPIRATION RATE: 24 BRPM | HEART RATE: 78 BPM | SYSTOLIC BLOOD PRESSURE: 163 MMHG | BODY MASS INDEX: 33.89 KG/M2

## 2018-04-13 LAB
EKG ATRIAL RATE: 76 BPM
EKG P AXIS: 35 DEGREES
EKG P-R INTERVAL: 138 MS
EKG Q-T INTERVAL: 456 MS
EKG QRS DURATION: 126 MS
EKG QTC CALCULATION (BAZETT): 513 MS
EKG R AXIS: -20 DEGREES
EKG T AXIS: 71 DEGREES
EKG VENTRICULAR RATE: 76 BPM

## 2018-04-13 PROCEDURE — 6370000000 HC RX 637 (ALT 250 FOR IP): Performed by: INTERNAL MEDICINE

## 2018-04-13 PROCEDURE — 6370000000 HC RX 637 (ALT 250 FOR IP): Performed by: STUDENT IN AN ORGANIZED HEALTH CARE EDUCATION/TRAINING PROGRAM

## 2018-04-13 PROCEDURE — 2580000003 HC RX 258: Performed by: NURSE PRACTITIONER

## 2018-04-13 PROCEDURE — 93225 XTRNL ECG REC<48 HRS REC: CPT

## 2018-04-13 PROCEDURE — 94762 N-INVAS EAR/PLS OXIMTRY CONT: CPT

## 2018-04-13 PROCEDURE — 6370000000 HC RX 637 (ALT 250 FOR IP): Performed by: NURSE PRACTITIONER

## 2018-04-13 PROCEDURE — 99239 HOSP IP/OBS DSCHRG MGMT >30: CPT | Performed by: FAMILY MEDICINE

## 2018-04-13 PROCEDURE — 93226 XTRNL ECG REC<48 HR SCAN A/R: CPT

## 2018-04-13 RX ORDER — ISOSORBIDE MONONITRATE 30 MG/1
15 TABLET, EXTENDED RELEASE ORAL DAILY
Status: DISCONTINUED | OUTPATIENT
Start: 2018-04-13 | End: 2018-04-13 | Stop reason: HOSPADM

## 2018-04-13 RX ORDER — CLOPIDOGREL BISULFATE 75 MG/1
75 TABLET ORAL DAILY
Qty: 30 TABLET | Refills: 3 | Status: SHIPPED | OUTPATIENT
Start: 2018-04-14 | End: 2018-08-06 | Stop reason: SDUPTHER

## 2018-04-13 RX ORDER — ISOSORBIDE MONONITRATE 30 MG/1
15 TABLET, EXTENDED RELEASE ORAL DAILY
Qty: 30 TABLET | Refills: 3 | Status: SHIPPED | OUTPATIENT
Start: 2018-04-14 | End: 2018-04-20

## 2018-04-13 RX ADMIN — FAMOTIDINE 20 MG: 20 TABLET, FILM COATED ORAL at 09:43

## 2018-04-13 RX ADMIN — CLOPIDOGREL 75 MG: 75 TABLET, FILM COATED ORAL at 09:43

## 2018-04-13 RX ADMIN — Medication 10 ML: at 09:45

## 2018-04-13 RX ADMIN — ISOSORBIDE MONONITRATE 15 MG: 30 TABLET ORAL at 11:01

## 2018-04-13 RX ADMIN — DOCUSATE SODIUM 100 MG: 100 CAPSULE ORAL at 09:43

## 2018-04-13 RX ADMIN — ASPIRIN 81 MG: 81 TABLET, COATED ORAL at 09:43

## 2018-04-13 RX ADMIN — LISINOPRIL 2.5 MG: 2.5 TABLET ORAL at 09:43

## 2018-04-13 ASSESSMENT — PAIN SCALES - GENERAL: PAINLEVEL_OUTOF10: 0

## 2018-04-14 ENCOUNTER — CARE COORDINATION (OUTPATIENT)
Dept: CASE MANAGEMENT | Age: 63
End: 2018-04-14

## 2018-04-20 ENCOUNTER — OFFICE VISIT (OUTPATIENT)
Dept: FAMILY MEDICINE CLINIC | Age: 63
End: 2018-04-20
Payer: MEDICARE

## 2018-04-20 VITALS
TEMPERATURE: 98.3 F | DIASTOLIC BLOOD PRESSURE: 102 MMHG | HEIGHT: 60 IN | BODY MASS INDEX: 33.41 KG/M2 | WEIGHT: 170.2 LBS | SYSTOLIC BLOOD PRESSURE: 144 MMHG | HEART RATE: 90 BPM

## 2018-04-20 DIAGNOSIS — I25.10 CAD S/P PERCUTANEOUS CORONARY ANGIOPLASTY: Primary | ICD-10-CM

## 2018-04-20 DIAGNOSIS — Z98.61 CAD S/P PERCUTANEOUS CORONARY ANGIOPLASTY: Primary | ICD-10-CM

## 2018-04-20 DIAGNOSIS — I10 ESSENTIAL HYPERTENSION: ICD-10-CM

## 2018-04-20 DIAGNOSIS — J41.0 SIMPLE CHRONIC BRONCHITIS (HCC): ICD-10-CM

## 2018-04-20 PROBLEM — J44.9 CHRONIC OBSTRUCTIVE PULMONARY DISEASE (HCC): Status: RESOLVED | Noted: 2017-09-17 | Resolved: 2018-04-20

## 2018-04-20 PROCEDURE — 3014F SCREEN MAMMO DOC REV: CPT | Performed by: INTERNAL MEDICINE

## 2018-04-20 PROCEDURE — 99213 OFFICE O/P EST LOW 20 MIN: CPT

## 2018-04-20 PROCEDURE — 99213 OFFICE O/P EST LOW 20 MIN: CPT | Performed by: INTERNAL MEDICINE

## 2018-04-20 PROCEDURE — 3023F SPIROM DOC REV: CPT | Performed by: INTERNAL MEDICINE

## 2018-04-20 PROCEDURE — 3017F COLORECTAL CA SCREEN DOC REV: CPT | Performed by: INTERNAL MEDICINE

## 2018-04-20 PROCEDURE — G8926 SPIRO NO PERF OR DOC: HCPCS | Performed by: INTERNAL MEDICINE

## 2018-04-20 PROCEDURE — 4004F PT TOBACCO SCREEN RCVD TLK: CPT | Performed by: INTERNAL MEDICINE

## 2018-04-20 PROCEDURE — G8427 DOCREV CUR MEDS BY ELIG CLIN: HCPCS | Performed by: INTERNAL MEDICINE

## 2018-04-20 PROCEDURE — G8417 CALC BMI ABV UP PARAM F/U: HCPCS | Performed by: INTERNAL MEDICINE

## 2018-04-20 PROCEDURE — 1111F DSCHRG MED/CURRENT MED MERGE: CPT | Performed by: INTERNAL MEDICINE

## 2018-04-20 PROCEDURE — G8598 ASA/ANTIPLAT THER USED: HCPCS | Performed by: INTERNAL MEDICINE

## 2018-04-20 ASSESSMENT — ENCOUNTER SYMPTOMS: ABDOMINAL PAIN: 0

## 2018-04-23 ENCOUNTER — APPOINTMENT (OUTPATIENT)
Dept: GENERAL RADIOLOGY | Age: 63
End: 2018-04-23
Payer: MEDICARE

## 2018-04-23 ENCOUNTER — HOSPITAL ENCOUNTER (OUTPATIENT)
Age: 63
Setting detail: OBSERVATION
Discharge: HOME | End: 2018-04-24
Attending: EMERGENCY MEDICINE | Admitting: FAMILY MEDICINE
Payer: MEDICARE

## 2018-04-23 ENCOUNTER — APPOINTMENT (OUTPATIENT)
Dept: CT IMAGING | Age: 63
End: 2018-04-23
Payer: MEDICARE

## 2018-04-23 DIAGNOSIS — R07.9 CHEST PAIN, UNSPECIFIED TYPE: Primary | ICD-10-CM

## 2018-04-23 DIAGNOSIS — R06.09 DYSPNEA ON EXERTION: ICD-10-CM

## 2018-04-23 LAB
-: NORMAL
ABSOLUTE EOS #: 0.25 K/UL (ref 0–0.44)
ABSOLUTE IMMATURE GRANULOCYTE: 0.04 K/UL (ref 0–0.3)
ABSOLUTE LYMPH #: 2.6 K/UL (ref 1.1–3.7)
ABSOLUTE MONO #: 0.78 K/UL (ref 0.1–1.2)
AMORPHOUS: NORMAL
ANION GAP SERPL CALCULATED.3IONS-SCNC: 15 MMOL/L (ref 9–17)
BACTERIA: NORMAL
BASOPHILS # BLD: 1 % (ref 0–2)
BASOPHILS ABSOLUTE: 0.04 K/UL (ref 0–0.2)
BILIRUBIN URINE: NEGATIVE
BUN BLDV-MCNC: 18 MG/DL (ref 8–23)
BUN/CREAT BLD: NORMAL (ref 9–20)
CALCIUM SERPL-MCNC: 9.9 MG/DL (ref 8.6–10.4)
CASTS UA: NORMAL /LPF (ref 0–8)
CHLORIDE BLD-SCNC: 101 MMOL/L (ref 98–107)
CO2: 23 MMOL/L (ref 20–31)
COLOR: YELLOW
COMMENT UA: ABNORMAL
CREAT SERPL-MCNC: 0.58 MG/DL (ref 0.5–0.9)
CRYSTALS, UA: NORMAL /HPF
D-DIMER QUANTITATIVE: 0.53 MG/L FEU
DIFFERENTIAL TYPE: ABNORMAL
EOSINOPHILS RELATIVE PERCENT: 3 % (ref 1–4)
EPITHELIAL CELLS UA: NORMAL /HPF (ref 0–5)
GFR AFRICAN AMERICAN: >60 ML/MIN
GFR NON-AFRICAN AMERICAN: >60 ML/MIN
GFR SERPL CREATININE-BSD FRML MDRD: NORMAL ML/MIN/{1.73_M2}
GFR SERPL CREATININE-BSD FRML MDRD: NORMAL ML/MIN/{1.73_M2}
GLUCOSE BLD-MCNC: 81 MG/DL (ref 70–99)
GLUCOSE URINE: NEGATIVE
HCT VFR BLD CALC: 44.2 % (ref 36.3–47.1)
HEMOGLOBIN: 14.5 G/DL (ref 11.9–15.1)
IMMATURE GRANULOCYTES: 1 %
KETONES, URINE: NEGATIVE
LEUKOCYTE ESTERASE, URINE: ABNORMAL
LYMPHOCYTES # BLD: 31 % (ref 24–43)
MCH RBC QN AUTO: 30.1 PG (ref 25.2–33.5)
MCHC RBC AUTO-ENTMCNC: 32.8 G/DL (ref 28.4–34.8)
MCV RBC AUTO: 91.7 FL (ref 82.6–102.9)
MONOCYTES # BLD: 9 % (ref 3–12)
MUCUS: NORMAL
NITRITE, URINE: NEGATIVE
NRBC AUTOMATED: 0 PER 100 WBC
OTHER OBSERVATIONS UA: NORMAL
PDW BLD-RTO: 13.2 % (ref 11.8–14.4)
PH UA: 6.5 (ref 5–8)
PLATELET # BLD: 320 K/UL (ref 138–453)
PLATELET ESTIMATE: ABNORMAL
PMV BLD AUTO: 9.7 FL (ref 8.1–13.5)
POC TROPONIN I: 0 NG/ML (ref 0–0.1)
POC TROPONIN I: 0.01 NG/ML (ref 0–0.1)
POC TROPONIN INTERP: NORMAL
POC TROPONIN INTERP: NORMAL
POTASSIUM SERPL-SCNC: 4.4 MMOL/L (ref 3.7–5.3)
PROTEIN UA: NEGATIVE
RBC # BLD: 4.82 M/UL (ref 3.95–5.11)
RBC # BLD: ABNORMAL 10*6/UL
RBC UA: NORMAL /HPF (ref 0–4)
RENAL EPITHELIAL, UA: NORMAL /HPF
SEG NEUTROPHILS: 55 % (ref 36–65)
SEGMENTED NEUTROPHILS ABSOLUTE COUNT: 4.59 K/UL (ref 1.5–8.1)
SODIUM BLD-SCNC: 139 MMOL/L (ref 135–144)
SPECIFIC GRAVITY UA: 1.01 (ref 1–1.03)
TRICHOMONAS: NORMAL
TURBIDITY: CLEAR
URINE HGB: NEGATIVE
UROBILINOGEN, URINE: NORMAL
WBC # BLD: 8.3 K/UL (ref 3.5–11.3)
WBC # BLD: ABNORMAL 10*3/UL
WBC UA: NORMAL /HPF (ref 0–5)
YEAST: NORMAL

## 2018-04-23 PROCEDURE — 81001 URINALYSIS AUTO W/SCOPE: CPT

## 2018-04-23 PROCEDURE — 71045 X-RAY EXAM CHEST 1 VIEW: CPT

## 2018-04-23 PROCEDURE — 71260 CT THORAX DX C+: CPT

## 2018-04-23 PROCEDURE — G0378 HOSPITAL OBSERVATION PER HR: HCPCS

## 2018-04-23 PROCEDURE — 2580000003 HC RX 258: Performed by: FAMILY MEDICINE

## 2018-04-23 PROCEDURE — 85379 FIBRIN DEGRADATION QUANT: CPT

## 2018-04-23 PROCEDURE — 99285 EMERGENCY DEPT VISIT HI MDM: CPT

## 2018-04-23 PROCEDURE — 84484 ASSAY OF TROPONIN QUANT: CPT

## 2018-04-23 PROCEDURE — 93005 ELECTROCARDIOGRAM TRACING: CPT

## 2018-04-23 PROCEDURE — 6360000004 HC RX CONTRAST MEDICATION: Performed by: STUDENT IN AN ORGANIZED HEALTH CARE EDUCATION/TRAINING PROGRAM

## 2018-04-23 PROCEDURE — 85025 COMPLETE CBC W/AUTO DIFF WBC: CPT

## 2018-04-23 PROCEDURE — 6370000000 HC RX 637 (ALT 250 FOR IP): Performed by: STUDENT IN AN ORGANIZED HEALTH CARE EDUCATION/TRAINING PROGRAM

## 2018-04-23 PROCEDURE — 80048 BASIC METABOLIC PNL TOTAL CA: CPT

## 2018-04-23 RX ORDER — ISOSORBIDE MONONITRATE 30 MG/1
30 TABLET, EXTENDED RELEASE ORAL DAILY
Status: DISCONTINUED | OUTPATIENT
Start: 2018-04-23 | End: 2018-04-24 | Stop reason: HOSPADM

## 2018-04-23 RX ORDER — SODIUM CHLORIDE 0.9 % (FLUSH) 0.9 %
10 SYRINGE (ML) INJECTION PRN
Status: DISCONTINUED | OUTPATIENT
Start: 2018-04-23 | End: 2018-04-24 | Stop reason: HOSPADM

## 2018-04-23 RX ORDER — ATORVASTATIN CALCIUM 80 MG/1
80 TABLET, FILM COATED ORAL NIGHTLY
Status: DISCONTINUED | OUTPATIENT
Start: 2018-04-23 | End: 2018-04-24 | Stop reason: HOSPADM

## 2018-04-23 RX ORDER — MORPHINE SULFATE 2 MG/ML
2 INJECTION, SOLUTION INTRAMUSCULAR; INTRAVENOUS
Status: DISCONTINUED | OUTPATIENT
Start: 2018-04-23 | End: 2018-04-24 | Stop reason: HOSPADM

## 2018-04-23 RX ORDER — POTASSIUM CHLORIDE 20 MEQ/1
40 TABLET, EXTENDED RELEASE ORAL PRN
Status: DISCONTINUED | OUTPATIENT
Start: 2018-04-23 | End: 2018-04-24 | Stop reason: HOSPADM

## 2018-04-23 RX ORDER — CLOPIDOGREL BISULFATE 75 MG/1
75 TABLET ORAL DAILY
Status: DISCONTINUED | OUTPATIENT
Start: 2018-04-23 | End: 2018-04-24 | Stop reason: HOSPADM

## 2018-04-23 RX ORDER — MAGNESIUM SULFATE 1 G/100ML
1 INJECTION INTRAVENOUS PRN
Status: DISCONTINUED | OUTPATIENT
Start: 2018-04-23 | End: 2018-04-24 | Stop reason: HOSPADM

## 2018-04-23 RX ORDER — ONDANSETRON 2 MG/ML
4 INJECTION INTRAMUSCULAR; INTRAVENOUS EVERY 6 HOURS PRN
Status: DISCONTINUED | OUTPATIENT
Start: 2018-04-23 | End: 2018-04-24 | Stop reason: HOSPADM

## 2018-04-23 RX ORDER — ACETAMINOPHEN 325 MG/1
650 TABLET ORAL EVERY 4 HOURS PRN
Status: DISCONTINUED | OUTPATIENT
Start: 2018-04-23 | End: 2018-04-24 | Stop reason: HOSPADM

## 2018-04-23 RX ORDER — POTASSIUM CHLORIDE 7.45 MG/ML
10 INJECTION INTRAVENOUS PRN
Status: DISCONTINUED | OUTPATIENT
Start: 2018-04-23 | End: 2018-04-24 | Stop reason: HOSPADM

## 2018-04-23 RX ORDER — LISINOPRIL 2.5 MG/1
2.5 TABLET ORAL DAILY
Status: DISCONTINUED | OUTPATIENT
Start: 2018-04-23 | End: 2018-04-24 | Stop reason: HOSPADM

## 2018-04-23 RX ORDER — SODIUM CHLORIDE 0.9 % (FLUSH) 0.9 %
10 SYRINGE (ML) INJECTION EVERY 12 HOURS SCHEDULED
Status: DISCONTINUED | OUTPATIENT
Start: 2018-04-23 | End: 2018-04-24 | Stop reason: HOSPADM

## 2018-04-23 RX ORDER — MORPHINE SULFATE 4 MG/ML
4 INJECTION, SOLUTION INTRAMUSCULAR; INTRAVENOUS
Status: DISCONTINUED | OUTPATIENT
Start: 2018-04-23 | End: 2018-04-24 | Stop reason: HOSPADM

## 2018-04-23 RX ORDER — ASPIRIN 81 MG/1
81 TABLET ORAL DAILY
Status: DISCONTINUED | OUTPATIENT
Start: 2018-04-23 | End: 2018-04-24 | Stop reason: HOSPADM

## 2018-04-23 RX ORDER — POTASSIUM CHLORIDE 20MEQ/15ML
40 LIQUID (ML) ORAL PRN
Status: DISCONTINUED | OUTPATIENT
Start: 2018-04-23 | End: 2018-04-24 | Stop reason: HOSPADM

## 2018-04-23 RX ORDER — BISACODYL 10 MG
10 SUPPOSITORY, RECTAL RECTAL DAILY PRN
Status: DISCONTINUED | OUTPATIENT
Start: 2018-04-23 | End: 2018-04-24 | Stop reason: HOSPADM

## 2018-04-23 RX ADMIN — Medication 10 ML: at 20:31

## 2018-04-23 RX ADMIN — IOPAMIDOL 75 ML: 755 INJECTION, SOLUTION INTRAVENOUS at 15:17

## 2018-04-23 RX ADMIN — ATORVASTATIN CALCIUM 80 MG: 80 TABLET, FILM COATED ORAL at 20:30

## 2018-04-23 ASSESSMENT — ENCOUNTER SYMPTOMS
VOMITING: 0
ABDOMINAL PAIN: 0
SORE THROAT: 0
CONSTIPATION: 0
EYE PAIN: 0
SHORTNESS OF BREATH: 1
VOICE CHANGE: 0
WHEEZING: 0
STRIDOR: 0
NAUSEA: 0
COUGH: 0
BACK PAIN: 0
DIARRHEA: 0
TROUBLE SWALLOWING: 0

## 2018-04-23 ASSESSMENT — HEART SCORE: ECG: 1

## 2018-04-23 ASSESSMENT — PAIN SCALES - GENERAL: PAINLEVEL_OUTOF10: 0

## 2018-04-24 VITALS
BODY MASS INDEX: 32.53 KG/M2 | OXYGEN SATURATION: 95 % | WEIGHT: 172.3 LBS | RESPIRATION RATE: 20 BRPM | HEART RATE: 86 BPM | DIASTOLIC BLOOD PRESSURE: 88 MMHG | HEIGHT: 61 IN | TEMPERATURE: 98.3 F | SYSTOLIC BLOOD PRESSURE: 133 MMHG

## 2018-04-24 LAB
ABSOLUTE EOS #: 0.26 K/UL (ref 0–0.44)
ABSOLUTE IMMATURE GRANULOCYTE: 0.03 K/UL (ref 0–0.3)
ABSOLUTE LYMPH #: 1.65 K/UL (ref 1.1–3.7)
ABSOLUTE MONO #: 0.63 K/UL (ref 0.1–1.2)
ANION GAP SERPL CALCULATED.3IONS-SCNC: 15 MMOL/L (ref 9–17)
BASOPHILS # BLD: 1 % (ref 0–2)
BASOPHILS ABSOLUTE: 0.03 K/UL (ref 0–0.2)
BUN BLDV-MCNC: 17 MG/DL (ref 8–23)
BUN/CREAT BLD: ABNORMAL (ref 9–20)
CALCIUM SERPL-MCNC: 8.7 MG/DL (ref 8.6–10.4)
CHLORIDE BLD-SCNC: 105 MMOL/L (ref 98–107)
CO2: 21 MMOL/L (ref 20–31)
CREAT SERPL-MCNC: 0.63 MG/DL (ref 0.5–0.9)
DIFFERENTIAL TYPE: ABNORMAL
EKG ATRIAL RATE: 91 BPM
EKG ATRIAL RATE: 93 BPM
EKG P AXIS: 42 DEGREES
EKG P AXIS: 53 DEGREES
EKG P-R INTERVAL: 132 MS
EKG P-R INTERVAL: 148 MS
EKG Q-T INTERVAL: 408 MS
EKG Q-T INTERVAL: 408 MS
EKG QRS DURATION: 124 MS
EKG QRS DURATION: 128 MS
EKG QTC CALCULATION (BAZETT): 501 MS
EKG QTC CALCULATION (BAZETT): 507 MS
EKG R AXIS: -11 DEGREES
EKG R AXIS: -26 DEGREES
EKG T AXIS: 69 DEGREES
EKG T AXIS: 71 DEGREES
EKG VENTRICULAR RATE: 91 BPM
EKG VENTRICULAR RATE: 93 BPM
EOSINOPHILS RELATIVE PERCENT: 4 % (ref 1–4)
GFR AFRICAN AMERICAN: >60 ML/MIN
GFR NON-AFRICAN AMERICAN: >60 ML/MIN
GFR SERPL CREATININE-BSD FRML MDRD: ABNORMAL ML/MIN/{1.73_M2}
GFR SERPL CREATININE-BSD FRML MDRD: ABNORMAL ML/MIN/{1.73_M2}
GLUCOSE BLD-MCNC: 105 MG/DL (ref 70–99)
HCT VFR BLD CALC: 40.4 % (ref 36.3–47.1)
HEMOGLOBIN: 12.6 G/DL (ref 11.9–15.1)
IMMATURE GRANULOCYTES: 1 %
LYMPHOCYTES # BLD: 28 % (ref 24–43)
MCH RBC QN AUTO: 29.9 PG (ref 25.2–33.5)
MCHC RBC AUTO-ENTMCNC: 31.2 G/DL (ref 28.4–34.8)
MCV RBC AUTO: 95.7 FL (ref 82.6–102.9)
MONOCYTES # BLD: 11 % (ref 3–12)
NRBC AUTOMATED: 0 PER 100 WBC
PDW BLD-RTO: 13.5 % (ref 11.8–14.4)
PLATELET # BLD: 281 K/UL (ref 138–453)
PLATELET ESTIMATE: ABNORMAL
PMV BLD AUTO: 9.5 FL (ref 8.1–13.5)
POTASSIUM SERPL-SCNC: 4.3 MMOL/L (ref 3.7–5.3)
RBC # BLD: 4.22 M/UL (ref 3.95–5.11)
RBC # BLD: ABNORMAL 10*6/UL
SEG NEUTROPHILS: 56 % (ref 36–65)
SEGMENTED NEUTROPHILS ABSOLUTE COUNT: 3.28 K/UL (ref 1.5–8.1)
SODIUM BLD-SCNC: 141 MMOL/L (ref 135–144)
WBC # BLD: 5.9 K/UL (ref 3.5–11.3)
WBC # BLD: ABNORMAL 10*3/UL

## 2018-04-24 PROCEDURE — 85025 COMPLETE CBC W/AUTO DIFF WBC: CPT

## 2018-04-24 PROCEDURE — 99217 PR OBSERVATION CARE DISCHARGE MANAGEMENT: CPT | Performed by: FAMILY MEDICINE

## 2018-04-24 PROCEDURE — 6370000000 HC RX 637 (ALT 250 FOR IP): Performed by: STUDENT IN AN ORGANIZED HEALTH CARE EDUCATION/TRAINING PROGRAM

## 2018-04-24 PROCEDURE — G0378 HOSPITAL OBSERVATION PER HR: HCPCS

## 2018-04-24 PROCEDURE — 36415 COLL VENOUS BLD VENIPUNCTURE: CPT

## 2018-04-24 PROCEDURE — 93005 ELECTROCARDIOGRAM TRACING: CPT

## 2018-04-24 PROCEDURE — 80048 BASIC METABOLIC PNL TOTAL CA: CPT

## 2018-04-24 RX ORDER — CYCLOBENZAPRINE HCL 5 MG
5 TABLET ORAL 2 TIMES DAILY PRN
Qty: 30 TABLET | Refills: 0 | Status: SHIPPED | OUTPATIENT
Start: 2018-04-24 | End: 2018-05-04

## 2018-04-24 RX ORDER — ALBUTEROL SULFATE 90 UG/1
2 AEROSOL, METERED RESPIRATORY (INHALATION) EVERY 6 HOURS PRN
Qty: 1 INHALER | Refills: 3 | Status: SHIPPED | OUTPATIENT
Start: 2018-04-24 | End: 2019-01-01 | Stop reason: SDUPTHER

## 2018-04-24 RX ORDER — ALBUTEROL SULFATE 2.5 MG/3ML
2.5 SOLUTION RESPIRATORY (INHALATION) EVERY 6 HOURS PRN
Status: DISCONTINUED | OUTPATIENT
Start: 2018-04-24 | End: 2018-04-24 | Stop reason: HOSPADM

## 2018-04-24 RX ADMIN — LISINOPRIL 2.5 MG: 2.5 TABLET ORAL at 10:58

## 2018-04-24 RX ADMIN — CLOPIDOGREL 75 MG: 75 TABLET, FILM COATED ORAL at 10:58

## 2018-04-24 RX ADMIN — ASPIRIN 81 MG: 81 TABLET, COATED ORAL at 10:58

## 2018-04-24 ASSESSMENT — PAIN SCALES - GENERAL: PAINLEVEL_OUTOF10: 0

## 2018-04-25 ENCOUNTER — CARE COORDINATION (OUTPATIENT)
Dept: CASE MANAGEMENT | Age: 63
End: 2018-04-25

## 2018-04-25 LAB
EKG ATRIAL RATE: 95 BPM
EKG P AXIS: 50 DEGREES
EKG P-R INTERVAL: 134 MS
EKG Q-T INTERVAL: 394 MS
EKG QRS DURATION: 126 MS
EKG QTC CALCULATION (BAZETT): 495 MS
EKG R AXIS: -30 DEGREES
EKG T AXIS: 60 DEGREES
EKG VENTRICULAR RATE: 95 BPM

## 2018-05-21 ENCOUNTER — HOSPITAL ENCOUNTER (EMERGENCY)
Age: 63
Discharge: HOME | End: 2018-05-21
Attending: EMERGENCY MEDICINE
Payer: MEDICARE

## 2018-05-21 ENCOUNTER — APPOINTMENT (OUTPATIENT)
Dept: GENERAL RADIOLOGY | Age: 63
End: 2018-05-21
Payer: MEDICARE

## 2018-05-21 ENCOUNTER — TELEPHONE (OUTPATIENT)
Dept: ADMINISTRATIVE | Age: 63
End: 2018-05-21

## 2018-05-21 VITALS
HEIGHT: 61 IN | TEMPERATURE: 97.7 F | DIASTOLIC BLOOD PRESSURE: 86 MMHG | SYSTOLIC BLOOD PRESSURE: 157 MMHG | HEART RATE: 89 BPM | RESPIRATION RATE: 21 BRPM | BODY MASS INDEX: 33.99 KG/M2 | OXYGEN SATURATION: 100 % | WEIGHT: 180 LBS

## 2018-05-21 DIAGNOSIS — R06.02 SHORTNESS OF BREATH: Primary | ICD-10-CM

## 2018-05-21 LAB
ABSOLUTE EOS #: 0.22 K/UL (ref 0–0.44)
ABSOLUTE IMMATURE GRANULOCYTE: 0.03 K/UL (ref 0–0.3)
ABSOLUTE LYMPH #: 2.71 K/UL (ref 1.1–3.7)
ABSOLUTE MONO #: 0.77 K/UL (ref 0.1–1.2)
ANION GAP SERPL CALCULATED.3IONS-SCNC: 14 MMOL/L (ref 9–17)
BASOPHILS # BLD: 1 % (ref 0–2)
BASOPHILS ABSOLUTE: 0.05 K/UL (ref 0–0.2)
BNP INTERPRETATION: ABNORMAL
BUN BLDV-MCNC: 20 MG/DL (ref 8–23)
BUN/CREAT BLD: ABNORMAL (ref 9–20)
CALCIUM SERPL-MCNC: 9 MG/DL (ref 8.6–10.4)
CHLORIDE BLD-SCNC: 103 MMOL/L (ref 98–107)
CO2: 20 MMOL/L (ref 20–31)
CREAT SERPL-MCNC: 0.57 MG/DL (ref 0.5–0.9)
DIFFERENTIAL TYPE: ABNORMAL
EKG ATRIAL RATE: 106 BPM
EKG P AXIS: 62 DEGREES
EKG P-R INTERVAL: 152 MS
EKG Q-T INTERVAL: 382 MS
EKG QRS DURATION: 124 MS
EKG QTC CALCULATION (BAZETT): 507 MS
EKG R AXIS: -34 DEGREES
EKG T AXIS: 61 DEGREES
EKG VENTRICULAR RATE: 106 BPM
EOSINOPHILS RELATIVE PERCENT: 2 % (ref 1–4)
GFR AFRICAN AMERICAN: >60 ML/MIN
GFR NON-AFRICAN AMERICAN: >60 ML/MIN
GFR SERPL CREATININE-BSD FRML MDRD: ABNORMAL ML/MIN/{1.73_M2}
GFR SERPL CREATININE-BSD FRML MDRD: ABNORMAL ML/MIN/{1.73_M2}
GLUCOSE BLD-MCNC: 126 MG/DL (ref 70–99)
HCT VFR BLD CALC: 46.6 % (ref 36.3–47.1)
HEMOGLOBIN: 15.4 G/DL (ref 11.9–15.1)
IMMATURE GRANULOCYTES: 0 %
LYMPHOCYTES # BLD: 28 % (ref 24–43)
MCH RBC QN AUTO: 30.1 PG (ref 25.2–33.5)
MCHC RBC AUTO-ENTMCNC: 33 G/DL (ref 28.4–34.8)
MCV RBC AUTO: 91.2 FL (ref 82.6–102.9)
MONOCYTES # BLD: 8 % (ref 3–12)
NRBC AUTOMATED: 0 PER 100 WBC
PDW BLD-RTO: 13.5 % (ref 11.8–14.4)
PLATELET # BLD: 273 K/UL (ref 138–453)
PLATELET ESTIMATE: ABNORMAL
PMV BLD AUTO: 9.8 FL (ref 8.1–13.5)
POC TROPONIN I: 0 NG/ML (ref 0–0.1)
POC TROPONIN I: 0.01 NG/ML (ref 0–0.1)
POC TROPONIN INTERP: NORMAL
POC TROPONIN INTERP: NORMAL
POTASSIUM SERPL-SCNC: 4.1 MMOL/L (ref 3.7–5.3)
PRO-BNP: 2386 PG/ML
RBC # BLD: 5.11 M/UL (ref 3.95–5.11)
RBC # BLD: ABNORMAL 10*6/UL
SEG NEUTROPHILS: 61 % (ref 36–65)
SEGMENTED NEUTROPHILS ABSOLUTE COUNT: 5.78 K/UL (ref 1.5–8.1)
SODIUM BLD-SCNC: 137 MMOL/L (ref 135–144)
WBC # BLD: 9.6 K/UL (ref 3.5–11.3)
WBC # BLD: ABNORMAL 10*3/UL

## 2018-05-21 PROCEDURE — 99285 EMERGENCY DEPT VISIT HI MDM: CPT

## 2018-05-21 PROCEDURE — 93005 ELECTROCARDIOGRAM TRACING: CPT

## 2018-05-21 PROCEDURE — 80048 BASIC METABOLIC PNL TOTAL CA: CPT

## 2018-05-21 PROCEDURE — 83880 ASSAY OF NATRIURETIC PEPTIDE: CPT

## 2018-05-21 PROCEDURE — 85025 COMPLETE CBC W/AUTO DIFF WBC: CPT

## 2018-05-21 PROCEDURE — 84484 ASSAY OF TROPONIN QUANT: CPT

## 2018-05-21 PROCEDURE — 71046 X-RAY EXAM CHEST 2 VIEWS: CPT

## 2018-05-21 RX ORDER — CETIRIZINE HYDROCHLORIDE 10 MG/1
10 TABLET ORAL DAILY
Qty: 30 TABLET | Refills: 0 | Status: SHIPPED | OUTPATIENT
Start: 2018-05-21 | End: 2018-01-01 | Stop reason: SDUPTHER

## 2018-05-21 ASSESSMENT — PAIN DESCRIPTION - FREQUENCY: FREQUENCY: CONTINUOUS

## 2018-05-21 ASSESSMENT — PAIN DESCRIPTION - PROGRESSION: CLINICAL_PROGRESSION: NOT CHANGED

## 2018-05-21 ASSESSMENT — PAIN DESCRIPTION - ORIENTATION: ORIENTATION: LOWER

## 2018-05-21 ASSESSMENT — PAIN DESCRIPTION - LOCATION: LOCATION: BACK

## 2018-05-21 ASSESSMENT — PAIN DESCRIPTION - PAIN TYPE: TYPE: ACUTE PAIN

## 2018-05-21 ASSESSMENT — ENCOUNTER SYMPTOMS
SORE THROAT: 0
COUGH: 1
SHORTNESS OF BREATH: 1
ABDOMINAL PAIN: 0

## 2018-05-21 ASSESSMENT — PAIN SCALES - GENERAL: PAINLEVEL_OUTOF10: 5

## 2018-05-21 ASSESSMENT — PAIN DESCRIPTION - DESCRIPTORS: DESCRIPTORS: CONSTANT

## 2018-05-21 ASSESSMENT — PAIN DESCRIPTION - ONSET: ONSET: SUDDEN

## 2018-08-02 DIAGNOSIS — I25.10 CORONARY ARTERY DISEASE INVOLVING NATIVE CORONARY ARTERY OF NATIVE HEART WITHOUT ANGINA PECTORIS: ICD-10-CM

## 2018-08-02 DIAGNOSIS — I10 ESSENTIAL HYPERTENSION: ICD-10-CM

## 2018-08-02 DIAGNOSIS — I50.22 CHRONIC SYSTOLIC CONGESTIVE HEART FAILURE (HCC): ICD-10-CM

## 2018-08-02 NOTE — TELEPHONE ENCOUNTER
Medication is on med list please review and address    Please address the medication refill and close the encounter. If I can be of assistance, please route to the applicable pool. Thank you. Next Visit Date:  No future appointments. Health Maintenance   Topic Date Due    Hepatitis C screen  1955    HIV screen  07/06/1969    Shingles Vaccine (1 of 2 - 2 Dose Series) 07/06/2004    Low dose CT lung screening  07/06/2009    Colon Cancer Screen FIT/FOBT  04/05/2015    Breast cancer screen  03/25/2016    A1C test (Diabetic or Prediabetic)  08/25/2017    Flu vaccine (1) 09/01/2018    Potassium monitoring  05/21/2019    Creatinine monitoring  05/21/2019    Lipid screen  04/12/2023    DTaP/Tdap/Td vaccine (2 - Td) 12/07/2026    Pneumococcal med risk  Completed       Hemoglobin A1C (%)   Date Value   08/25/2016 5.8   09/02/2014 5.9   11/20/2012 6.0             ( goal A1C is < 7)   Microalb/Crt.  Ratio (mcg/mg creat)   Date Value   01/10/2013 16     LDL Cholesterol (mg/dL)   Date Value   04/12/2018 87   11/19/2017 138 (H)       (goal LDL is <100)   AST (U/L)   Date Value   04/12/2018 17     ALT (U/L)   Date Value   04/12/2018 16     BUN (mg/dL)   Date Value   05/21/2018 20     BP Readings from Last 3 Encounters:   05/21/18 (!) 157/86   04/24/18 133/88   04/20/18 (!) 144/102          (goal 120/80)    All Future Testing planned in [facility]  Lab Frequency Next Occurrence   DORCAS DIGITAL SCREEN W OR WO CAD BILATERAL Once 09/30/2018               Patient Active Problem List:     CAD (coronary artery disease)     Chronic bronchitis (HCC)     Smoker     GERD (gastroesophageal reflux disease)     Thoracic scoliosis     Ganglion cyst     CAD S/P percutaneous coronary angioplasty (4/2013-Dr. Duane Pila)     CAD S/P percutaneous coronary angioplasty-open stent( 7/29/2013-Dr. Fatoumata Dumont)     Chest pain     CAD (coronary artery disease)     Atypical chest pain     S/P - Mid RCA 10/17/14-Dr. nichols     S/P stents LAD, LCX

## 2018-08-06 NOTE — TELEPHONE ENCOUNTER
and proximal RCA -      Hyperlipidemia     Chest pain      HLD (hyperlipidemia)     Chronic back pain     Precordial pain     Breast cancer screening     Lumbosacral pain     Low back pain with sciatica     Intervertebral disk disease     Midline low back pain without sciatica     Hip pain     Essential hypertension     Headache in front of head     Accelerated hypertension     Speech disturbance     RBBB (right bundle branch block)     S/P cardiac cath - MV CAD 9/20/17 Dr. Lani Galicia     Non-compliance     Cellulitis of arm, right     Superficial thrombophlebitis of right upper extremity     Tobacco use     NSTEMI (non-ST elevated myocardial infarction) ([address]     S/P drug eluting coronary stent placement     Dyspnea on exertion

## 2018-08-07 RX ORDER — CLOPIDOGREL BISULFATE 75 MG/1
TABLET ORAL
Qty: 30 TABLET | Refills: 1 | Status: SHIPPED | OUTPATIENT
Start: 2018-08-07 | End: 2018-01-01 | Stop reason: SDUPTHER

## 2018-08-08 RX ORDER — LISINOPRIL 2.5 MG/1
TABLET ORAL
Qty: 90 TABLET | Refills: 0 | Status: SHIPPED | OUTPATIENT
Start: 2018-08-08 | End: 2018-01-01 | Stop reason: SDUPTHER

## 2018-10-28 PROBLEM — I50.9 HEART FAILURE (HCC): Status: ACTIVE | Noted: 2018-01-01

## 2018-10-28 NOTE — ED PROVIDER NOTES
I performed a history and physical examination of the patient and discussed management with the resident. I reviewed the residents note and agree with the documented findings and plan of care. Any areas of disagreement are noted on the chart. I was personally present for the key portions of any procedures. I have documented in the chart those procedures where I was not present during the key portions. I have reviewed the emergency nurses triage note. I agree with the chief complaint, past medical history, past surgical history, allergies, medications, social and family history as documented unless otherwise noted below. Documentation of the HPI, Physical Exam and Medical Decision Making performed by medical students or scribes is based on my personal performance of the HPI, PE and MDM. For Phys Assistant/ Nurse Practitioner cases/documentation I have personally evaluated this patient and have completed at least one if not all key elements of the E/M (history, physical exam, and MDM). I find the patient's history and physical exam are consistent with the NP/PA documentation. I agree with the care provided, treatment rendered, disposition and followup plan. Additional findings are as noted. Aba Mcmanus. Evelio Mcintyre MD  Attending Emergency  Physician    EKG Interpretation    Interpreted by me    Rhythm: Sinus tachycardia  Rate: 95  Axis: normal  Ectopy: Occasional VPCs  Conduction: Right bundle branch block  ST Segments: no acute change  T Waves: no acute change  Q Waves: Q waves in leads V1 and V2. LVH  Clinical Impression: no acute changes. Findings as noted above. No significant morphologic changes noted on comparison with prior tracing dated 21 May 2018. C/O SOB AFTER CLIMBING STAIRS AT HOME TODAY. ALSO C/O WORSENING ORTHOPNEA FOR SEV WEEKS. NO FEVER, CHILLS, CHEST PAIN, ABD PAIN, NAUSEA, VOMITING, DIARRHEA. HX OF CAD, CHF, VENT ANEURYSM. TAKING ALL MEDS AS RX'ED. AWAKE, ALERT, COOP, RESP.   LUNGS CLEAR

## 2018-10-28 NOTE — CARE COORDINATION
Patient became very upset and aggravated with any questions. Patient is yelling and asking CM to leave room. Primary care RN also in room and observant to dialogue. Defer at this time so as to de escalate patient.

## 2018-10-28 NOTE — FLOWSHEET NOTE
patient admitted to car 3 room 3014 from ER. Baseline assessment and vitals taken. Patient oriented to room, unit and call light. Will continue to monitor.

## 2018-10-28 NOTE — ED NOTES
Pt arrived by Medic 18 with the CO SOB. Pt reports having SOB twice today, pt stated she used her albuterol inhaler x3 PTA. Pt denies any chest pain. Pt reprots having COPD and CHF. Pt A&O answering questions. Pt connected to full monitor. EKG complete, 20G IV to the Rt AC, POC Trop running. Will continue to monitor pt.      August Espino RN  10/28/18 7915

## 2018-10-28 NOTE — ED PROVIDER NOTES
[facility]  Emergency Department Encounter  EmergencyMedicine Resident     Pt Name:Xochitl Grimes  MRN: [id number]  Yenny 1955  Date of evaluation: 10/28/18  PCP:  No primary care provider on file. CHIEF COMPLAINT       Chief Complaint   Patient presents with    Shortness of Breath     Pt CO of SOB x2, pt reports using albuterol inhaler x3 PTA, pt denies any chest pain. HISTORY OF PRESENT ILLNESS  (Location/Symptom, Timing/Onset, Context/Setting, Quality, Duration, Modifying Factors, Severity.)      Emeterio Ramos is a 58 y.o. female who presents with 2 episodes of shortness of breath that it happened approximately noon today. Patient took her home albuterol without any relief. Patient has a history significant history for COPD as well as CHF, last echocardiogram was done in April of this year showing an estimated ejection fraction of 35%. Patient is a current every day smoker, approximately one pack per day. Patient follows with Dr. Cassy Azul for her cardiology care, last set stent placed in April of this year. Patient has no issues with taking her medications, says at this is not consistent with her prior presentation requiring stenting. She does not complain of any chest pain at this time, she does complain of intermittent shortness of breath that is worse with lying flat. She denies any increasing cough symptoms, no fevers or chills. No sick contacts, weight loss, night sweats. PAST MEDICAL / SURGICAL / SOCIAL / FAMILY HISTORY      has a past medical history of Arthritis; CAD (coronary artery disease); Carotid artery stenosis; Chronic back pain; Clotting disorder ([facility] COPD (chronic obstructive pulmonary disease) ([facility] Disease of blood and blood forming organ; Drug abuse ([facility] GERD (gastroesophageal reflux disease); H/O blood clots; Head injury; Heart attack ([address] Hx of blood clots; Hyperlipidemia; Hypertension; NSTEMI (non-ST elevated myocardial infarction) ([address]  Prediabetes;

## 2018-10-28 NOTE — ED NOTES
Pt ambulated to bathroom, pt tolerated well, will continue to monitor pt.      Faythe Heimlich, RN  10/28/18 0518

## 2018-10-29 NOTE — PROGRESS NOTES
Pt refusing therapy, says she refuses therapy every time she comes here states \"i have a heart condition and need to save my energy up for other things\"

## 2018-10-29 NOTE — H&P
[facility]  History & Physical Examination Note              Date:   10/28/2018  Patient name:  Shara Ramos  Date of admission:  10/28/2018  3:06 PM  MRN:   [id number]  YOB: 1955    CHIEF COMPLAINT:       Chief Complaint   Patient presents with    Shortness of Breath     Pt CO of SOB x2, pt reports using albuterol inhaler x3 PTA, pt denies any chest pain. History Obtained From:  Patient and chart review. HPI:     The patient is a 58 y.o.  female with history of Systolic CHF, CAD s/p UCD(6/2834), hypertension, COPD, tobacco abuse, who presented with shortness of breath. Patient states symptoms have been going on since this afternoon. Complaining of exertional dyspnea as well as dyspnea while lying down, patient states this is something new for her. She did try taking albuterol inhaler without any significant relief. Patient denies any chest pain, or leg swelling, cough, fever, dizziness, lightheadedness, sweating or numbness tingling in the extremities. Patient states she has been compliant with her medications. Patient states she is allergic to Lasix and metoprolol as last time she got it, had troubles with breathing. She has a history of CAD s/p cardiac cath earlier this year and follows Dr. Paola Brannon. At ER, patient was found to be tachypneic, tachycardic, hypertensive in 160s over 110s, with diminished breath sounds bilaterally. Labs included normal troponin levels, chest x-ray did not show any vascular congestion, her proBNP was elevated. She was started on nitro drip  and she is admitted to the hospital for CHF exacerbation    PAST MEDICAL HISTORY:        has a past medical history of Arthritis; CAD (coronary artery disease); Carotid artery stenosis; Chronic back pain; Clotting disorder ([address] COPD (chronic obstructive pulmonary disease) ([facility]  Disease of blood and blood forming organ; Drug abuse ([facility] GERD (gastroesophageal reflux disease); H/O blood clots; Head injury; Heart attack ([facility] Hx of blood clots; Hyperlipidemia; Hypertension; NSTEMI (non-ST elevated myocardial infarction) ([facility] Prediabetes; and Stomach ulcer. PAST SURGICAL HISTORY:      has a past surgical history that includes Coronary angioplasty with stent (2/14/2006,04/2013); Hysterectomy (2005); Tonsillectomy (1960); Tubal ligation (1979); skin biopsy; Endoscopy, colon, diagnostic; and cyst removal (Right, 9/26/2013). FAMILY HISTORY:     family history includes Diabetes in her mother; Heart Disease in her father and mother; High Blood Pressure in her father and mother; High Cholesterol in her father and mother; Other in her mother; Parkinsonism in her mother. HOME MEDICATIONS:     Prior to Admission medications    Medication Sig Start Date End Date Taking? Authorizing Provider   lisinopril (PRINIVIL;ZESTRIL) 2.5 MG tablet TAKE ONE TABLET BY MOUTH DAILY 8/8/18   Nirmal Liu MD   clopidogrel (PLAVIX) 75 MG tablet TAKE ONE TABLET BY MOUTH DAILY 8/7/18   Wing Yunior MD   cetirizine (ZYRTEC ALLERGY) 10 MG tablet Take 1 tablet by mouth daily 5/21/18   Geeta Kyle MD   metoprolol tartrate (LOPRESSOR) 25 MG tablet Take 0.5 tablets by mouth 2 times daily 4/24/18   Hans Richardson MD   albuterol sulfate HFA (PROVENTIL HFA) 108 (90 Base) MCG/ACT inhaler Inhale 2 puffs into the lungs every 6 hours as needed for Wheezing 4/24/18   Hans Richardson MD   ondansetron [facility]) 4 MG tablet Take 1 tablet by mouth every 8 hours as needed for Nausea or Vomiting 4/8/18   Hans Richardson MD   Blood Pressure Monitoring (SURELIFE BP MONITOR/ARM) SANDY Dx: Essential Hypertension.   Monitor BP daily and log 10/2/17   Chris Noel MD   atorvastatin (LIPITOR) 80 MG tablet Take 1 tablet by mouth nightly 9/21/17   Aditi Kelley MD   aspirin 81 MG tablet Take 81 mg by mouth daily    Historical Provider, MD   NITROSTAT 0.4 MG SL tablet DISSOLVE 1 TAB UNDER EOMI  Ear- NO ear pain, normal external ear , no discharge  Lungs - b/l exp wheezes, diminished on right side  Cardiovascular - Heart sounds are normal.  Regular rhythm, normal rate without murmur, gallop or rub. Abdomen - Soft, nontender, nondistended, no masses or organomegaly  Neurologic - There are no new focal motor or sensory deficits, muscle strength 5/5 in all extremities, normal muscle tone and bulk, no abnormal sensation. Skin - No bruising or bleeding on exposed skin area  Extremities - No cyanosis, clubbing or edema  Psych - normal affect     DIAGNOSTICS:      Laboratory Testing:    Recent Results (from the past 24 hour(s))   POCT troponin    Collection Time: 10/28/18  3:17 PM   Result Value Ref Range    POC Troponin I 0.02 0.00 - 0.10 ng/mL    POC Troponin Interp       The Troponin-I (POC) results cannot be compared to the Troponin-T results.    CBC Auto Differential    Collection Time: 10/28/18  3:34 PM   Result Value Ref Range    WBC 9.3 3.5 - 11.3 k/uL    RBC 4.95 3.95 - 5.11 m/uL    Hemoglobin 14.9 11.9 - 15.1 g/dL    Hematocrit 45.3 36.3 - 47.1 %    MCV 91.5 82.6 - 102.9 fL    MCH 30.1 25.2 - 33.5 pg    MCHC 32.9 28.4 - 34.8 g/dL    RDW 13.9 11.8 - 14.4 %    Platelets 951 307 - 226 k/uL    MPV 10.7 8.1 - 13.5 fL    NRBC Automated 0.0 0.0 per 100 WBC    Differential Type NOT REPORTED     Seg Neutrophils 53 36 - 65 %    Lymphocytes 33 24 - 43 %    Monocytes 8 3 - 12 %    Eosinophils % 5 (H) 1 - 4 %    Basophils 1 0 - 2 %    Immature Granulocytes 0 0 %    Segs Absolute 4.96 1.50 - 8.10 k/uL    Absolute Lymph # 3.07 1.10 - 3.70 k/uL    Absolute Mono # 0.78 0.10 - 1.20 k/uL    Absolute Eos # 0.44 0.00 - 0.44 k/uL    Basophils # 0.05 0.00 - 0.20 k/uL    Absolute Immature Granulocyte 0.03 0.00 - 0.30 k/uL    WBC Morphology NOT REPORTED     RBC Morphology NOT REPORTED     Platelet Estimate NOT REPORTED    Basic Metabolic Panel    Collection Time: 10/28/18  3:34 PM   Result Value Ref Range    Glucose 94 70 Lasix and beta blockers, received HCTZ in ER  Continue nitro drip  Echo ordered  Strict I/Os  Cardiac diet with fluid restriction  Cardiology consulted, appreciate recs    COPD  RT eval and treat  Monitor vitals      DVT prophylaxis: Lovenox 40 mg SC  GI prophylaxis: reason for no GI prophylaxis: low risk      Consultations:   Consults: IP CONSULT TO INTERNAL MEDICINE  IP CONSULT TO FAMILY MEDICINE  IP CONSULT TO CARDIOLOGY  PT/OT     The severity of this patient's signs and symptoms (specify CHF exacerbation) indicate the need for an inpatient admission.       Above plan discussed with the patient in room, who agreed to the above plan     Plan will be discussed with the attending, Dr Ritesh Wayne MD  Family Medicine Resident  10/28/2018 8:14 PM

## 2018-10-29 NOTE — PROGRESS NOTES
[facility]  Occupational Therapy Not Seen Note    DATE: 10/29/2018  Name: Erika Winchester  : 1955  MRN: [id number]    Patient not available for Occupational Therapy due to:    [] Testing:    [] Hemodialysis    [] Blood Transfusion in Progress    []Refusal by Patient:    [] Surgery/Procedure:    [] Strict Bedrest    [] Sedation    [] Spine Precautions     [] Pt being transferred to palliative care at this time. Spoke with pt/family and OT services to be defered. [x] Pt baseline with functional mobility and ADL/functional tasks. Pt with no OT acute care needs at this time, will defer OT eval. Pt adamantly declines therapy services. Pt states \"I tell them I don't need therapy every time I come here! \" Pt educated in purpose of evaluation and importance of activity. Pt states \"I can't because of my heart condition, I get short of breath with activity. \" Pt becoming increasingly agitated with request to demo transfer/ mobility or participate in evaluation. Pt states pt does not need therapy and does not want therapists to return.  Will defer OT evaluation per pt request    [] Other    Next Scheduled Treatment: Defer OT evaluation per pt request    Signature: LIZ Isidro/EMILY

## 2018-10-29 NOTE — PROGRESS NOTES
GFR Comment          GFR Staging NOT REPORTED    Brain Natriuretic Peptide    Collection Time: 10/28/18  3:34 PM   Result Value Ref Range    Pro-BNP 2,088 (H) <300 pg/mL    BNP Interpretation         POCT troponin    Collection Time: 10/28/18  5:38 PM   Result Value Ref Range    POC Troponin I 0.02 0.00 - 0.10 ng/mL    POC Troponin Interp       The Troponin-I (POC) results cannot be compared to the Troponin-T results. EKG 12 Lead    Collection Time: 10/28/18 11:05 PM   Result Value Ref Range    Ventricular Rate 82 BPM    Atrial Rate 82 BPM    P-R Interval 132 ms    QRS Duration 136 ms    Q-T Interval 460 ms    QTc Calculation (Bazett) 537 ms    P Axis 37 degrees    R Axis -27 degrees    T Axis 69 degrees   BASIC METABOLIC PANEL    Collection Time: 10/29/18  6:05 AM   Result Value Ref Range    Glucose 132 (H) 70 - 99 mg/dL    BUN 15 8 - 23 mg/dL    CREATININE 0.61 0.50 - 0.90 mg/dL    Bun/Cre Ratio NOT REPORTED 9 - 20    Calcium 9.3 8.6 - 10.4 mg/dL    Sodium 137 135 - 144 mmol/L    Potassium 3.8 3.7 - 5.3 mmol/L    Chloride 103 98 - 107 mmol/L    CO2 21 20 - 31 mmol/L    Anion Gap 13 9 - 17 mmol/L    GFR Non-African American >60 >60 mL/min    GFR African American >60 >60 mL/min    GFR Comment          GFR Staging NOT REPORTED    CBC    Collection Time: 10/29/18  6:05 AM   Result Value Ref Range    WBC 6.7 3.5 - 11.3 k/uL    RBC 4.55 3.95 - 5.11 m/uL    Hemoglobin 13.8 11.9 - 15.1 g/dL    Hematocrit 42.4 36.3 - 47.1 %    MCV 93.2 82.6 - 102.9 fL    MCH 30.3 25.2 - 33.5 pg    MCHC 32.5 28.4 - 34.8 g/dL    RDW 14.2 11.8 - 14.4 %    Platelets 162 976 - 233 k/uL    MPV 10.2 8.1 - 13.5 fL    NRBC Automated 0.0 0.0 per 100 WBC         Imaging/Diagonstics:  EKG: normal sinus rhythm with RBBB. Minimal voltage criteria for LVH, may be a normal variant. Septal infarct (present 08/2016). When compared to ECG performed on 10/25/2018, aberrant conduction no longer present.  Preliminary result, completed 10/2/2018    CXR: chronic

## 2018-10-29 NOTE — CONSULTS
sweating or numbness tingling in the extremities. Patient states she has been compliant with her medications. Patient states she is allergic to Lasix and metoprolol as last time she got it, had troubles with breathing. POC troponin I of 0.02. BNP of 2,088. EKG 10/29/18  Normal sinus rhythm  Right bundle branch block  Minimal voltage criteria for LVH, may be normal variant  Septal infarct (cited on or before 24-AUG-2016)  Abnormal ECG  When compared with ECG of 28-OCT-2018 15:11,  Aberrant conduction is no longer Present    Cardiac Testing   CATH 4/05/18: [id number] and OM2     ECHO 4/04/18: Mod LVH. EF 35%, basal septal hypertrophy (\"sigmoid septum\"), mild AI, aortic leaflets mildly thickened, Dilated aortic root 3.9 cm.      HOLTER 3/14/18: SR with ST/SB. Rare PACs with pairs. Rare PVCs with couplets.      CTS 11/20/17: Pt known to us, she is a candidate for surgery but wants to have second opinion. Will see her again if she is agreeable to surgery. Chris Raymond MD     CTS 9/20/17: Pt seen and examined, she is a candidate for CABG and LVA resection. Pt wants to go home and come back for surgery, discussed with Dr. Abdiel Sosa, will get PFTs, carotid dopplers and if she has no CP or dyspnea on walking around in the unit, she could go home and come back for early surgery. Chris Raymond MD     STRESS 9/18/17: Findings concerning for infarct of the apex, lateral and anterior walls. No reversible defect. EF 27%.      1. History of coronary artery disease with PCI of the LAD and left circ. before with admission to the hospital on October 17, 2014, with inferior ST elevation myocardial infarction. She did have coronary angiography done which showed the left main to be normal, LAD had patent stents, left circ. had patent stents, RCA had proximal stent with 40% instent restenosis, the mid segment had 100% occlusion, which was reduced to 0% using 2.75 x 18 mm drug-eluting stent.  Her left ventricular ejection fraction was 50%. She has been maintained on aspirin, Lipitor, lisinopril, metoprolol and Plavix since then. Her last lipid profile was done on October 18, 2014, and her LDL was 126, her HDL was 31 and her triglyceride was 84. 2.History of hypertension. 3.History of hyperlipidemia. 4.History of chronic smoking. I did  her regarding stopping smoking. 5.Stress test 11/19/2015: Stable appearing large anterior wall fixed defect compatible with patients known infarction and essentially unchanged as compared to prior study. EF 58%. Past Medical History:   has a past medical history of Arthritis; CAD (coronary artery disease); Carotid artery stenosis; Chronic back pain; Clotting disorder ([address] COPD (chronic obstructive pulmonary disease) ([address] Disease of blood and blood forming organ; Drug abuse ([address] GERD (gastroesophageal reflux disease); H/O blood clots; Head injury; Heart attack ([address] Hx of blood clots; Hyperlipidemia; Hypertension; NSTEMI (non-ST elevated myocardial infarction) ([address] Prediabetes; and Stomach ulcer. Past Surgical History:   has a past surgical history that includes Coronary angioplasty with stent (2/14/2006,04/2013); Hysterectomy (2005); Tonsillectomy (1960); Tubal ligation (1979); skin biopsy; Endoscopy, colon, diagnostic; and cyst removal (Right, 9/26/2013). Home Medications:    Prior to Admission medications    Medication Sig Start Date End Date Taking?  Authorizing Provider   lisinopril (PRINIVIL;ZESTRIL) 2.5 MG tablet TAKE ONE TABLET BY MOUTH DAILY 8/8/18   Giselle Leonardo MD   clopidogrel (PLAVIX) 75 MG tablet TAKE ONE TABLET BY MOUTH DAILY 8/7/18   Ha Plasencia MD   cetirizine (ZYRTEC ALLERGY) 10 MG tablet Take 1 tablet by mouth daily 5/21/18   Noah Woods MD   metoprolol tartrate (LOPRESSOR) 25 MG tablet Take 0.5 tablets by mouth 2 times daily 4/24/18   Prince Quiñones MD   albuterol sulfate HFA (PROVENTIL HFA) 108 (90 Base) MCG/ACT inhaler Inhale 2 puffs into the

## 2018-10-30 NOTE — PROGRESS NOTES
[address] [facility]  Progress Note    Date:   10/30/2018  Patient name:  Emery Xiao  Date of admission:  10/28/2018  3:06 PM  MRN:   [id number]  YOB: 1955    SUBJECTIVE/Last 24 hours update:     Patient seen and examined at the bed side. VSS. No new episodes of arrhythmia   Echo stable, EF 35%, getting MUGA scan, consideration of AICD placement  Patient states she was able to get more rest overnight, but she had another episode of shortness of breath overnight that resolved when she sat on the side of her bed. Was given NC overnight. Patient is wearing O2 via nasal cannula during examination but states that \"she forgot to take this off\". She denies chest pain, abdominal pain, or changes in bowel habits. Notes from nursing staff and Consults had been reviewed, and the overnight progress had been checked with the nursing staff as well. HPI:   Emery Xiao is a 58year old female with PMH significant for systolic CHF with EF 69%, HTN, HLD, NSTEMI, pre-DM, tobaccu use, GERD who presents for evaluation of shortness of breath. Albuterol did not relieve her symptoms at home and therefore she presented to the ED for evaluation. In ED patient was hypertensive 199/112, tachypneic, and tachycardic. Serial troponins negative. KFT WNL. Patient was alert without any gross focal or motor deficits or headache. BNP in 2000s, close to patients baseline. Due to her allergies to beta blockers and lasix, patient was placed on a nitro drip and HCTZ. Nitro drip was discontinued yesterday. She has had had intermittent episodes of shortness of breath throughout her stay that seem to coincide with bradycardic or tachycardic episodes. Patient has been wearing a nasal cannula PRN. She is a current smoker and does not use O2 at home.      REVIEW OF SYSTEMS:      CONSTITUTIONAL:  no fevers, no headcahes  EYES: negative for blury vision  HEENT: No spironolactone (ALDACTONE) tablet 25 mg  25 mg Oral Daily Heidi Livingston MD   25 mg at 10/30/18 0902    bumetanide (BUMEX) tablet 1 mg  1 mg Oral Daily Hiedi Livingston MD   1 mg at 10/30/18 0902    sodium chloride flush 0.9 % injection 10 mL  10 mL Intravenous 2 times per day Anuel Ford MD   10 mL at 10/29/18 2021    sodium chloride flush 0.9 % injection 10 mL  10 mL Intravenous PRN Anuel Ford MD        acetaminophen (TYLENOL) tablet 650 mg  650 mg Oral Q4H PRN Anuel Ford MD   650 mg at 10/29/18 0308    enoxaparin (LOVENOX) injection 40 mg  40 mg Subcutaneous Daily Anuel Ford MD        aspirin chewable tablet 81 mg  81 mg Oral Daily Lucien Montiel MD   81 mg at 10/30/18 0902    atorvastatin (LIPITOR) tablet 80 mg  80 mg Oral Nightly Lucien Montiel MD   80 mg at 10/29/18 2021    clopidogrel (PLAVIX) tablet 75 mg  75 mg Oral Daily Lucien Montiel MD   75 mg at 10/30/18 0902    lisinopril (PRINIVIL;ZESTRIL) tablet 2.5 mg  2.5 mg Oral Daily Lucien Montiel MD   2.5 mg at 10/30/18 0902    fluticasone (FLONASE) 50 MCG/ACT nasal spray 1 spray  1 spray Each Nare Daily Lucien Montiel MD   1 spray at 10/30/18 0902    ipratropium-albuterol (DUONEB) nebulizer solution 1 ampule  1 ampule Inhalation Q6H PRN Lucien Montiel MD           ASSESSMENT:     Principal Problem:    Heart failure ([facility]  Active Problems:    CAD (coronary artery disease)    HLD (hyperlipidemia)    Essential hypertension    Acute on chronic systolic (congestive) heart failure ([facility]  Resolved Problems:    * No resolved hospital problems. *      PLAN:   Acute on chronic systolic HF, ischemic CM, EF 35%- likely exacerbation secondary to HTN. Was having SOB, orthopnea.  EF 30-35% with evidence of systolic and diastolic dysfunction - stable from prior.    - continue diuresis with HCTZ; aldactone added per cardiology recommendations    - nitro drip discontinued    - holter monitor on discharge per cardiology recommendations

## 2018-10-30 NOTE — PROGRESS NOTES
BRONCHOSPASM/BRONCHOCONSTRICTION     [x]         IMPROVE AERATION/BREATH SOUNDS  [x]   ADMINISTER BRONCHODILATOR THERAPY AS APPROPRIATE  [x]   ASSESS BREATH SOUNDS  [x]   IMPLEMENT AEROSOL/MDI PROTOCOL  [x]   PATIENT EDUCATION AS NEEDED    THEODORE MTZatient Assessment complete. Heart failure ([address] [I50.9]  Heart failure ([address] [I50.9] . Vitals:    10/30/18 0806   BP:    Pulse:    Resp:    Temp:    SpO2: 97%   . Patients home meds are   Prior to Admission medications    Medication Sig Start Date End Date Taking? Authorizing Provider   lisinopril (PRINIVIL;ZESTRIL) 2.5 MG tablet TAKE ONE TABLET BY MOUTH DAILY 8/8/18   Lilliana Gilmore MD   clopidogrel (PLAVIX) 75 MG tablet TAKE ONE TABLET BY MOUTH DAILY 8/7/18   Elder Griffin MD   cetirizine (ZYRTEC ALLERGY) 10 MG tablet Take 1 tablet by mouth daily 5/21/18   Heike Rivera MD   metoprolol tartrate (LOPRESSOR) 25 MG tablet Take 0.5 tablets by mouth 2 times daily 4/24/18   Arpita Nicholson MD   albuterol sulfate HFA (PROVENTIL HFA) 108 (90 Base) MCG/ACT inhaler Inhale 2 puffs into the lungs every 6 hours as needed for Wheezing 4/24/18   Arpita Nicholson MD   ondansetron [facility]) 4 MG tablet Take 1 tablet by mouth every 8 hours as needed for Nausea or Vomiting 4/8/18   Arpita Nicholson MD   Blood Pressure Monitoring (SURELIFE BP MONITOR/ARM) SANDY Dx: Essential Hypertension. Monitor BP daily and log 10/2/17   Kee BloodgoMD smitha   atorvastatin (LIPITOR) 80 MG tablet Take 1 tablet by mouth nightly 9/21/17   Carol Gonzalez MD   aspirin 81 MG tablet Take 81 mg by mouth daily    Historical Provider, MD   NITROSTAT 0.4 MG SL tablet DISSOLVE 1 TAB UNDER TONGUE FOR CHEST PAIN - IF PAIN REMAINS AFTER 5 MIN, CALL 911 AND REPEAT DOSE.  MAX 3 TABS IN 15 MINUTES 7/23/15   Karissa Fleming MD   .  Recent Surgical History: None = 0     Assessment     Peak Flow (asthma only)        RR 18  Breath Sounds: clear      · Bronchodilator assessment at level 1  · Hyperinflation

## 2018-10-31 NOTE — PROGRESS NOTES
[id number] and OM2     ECHO 4/04/18: Mod LVH. EF 35%, basal septal hypertrophy (\"sigmoid septum\"), mild AI, aortic leaflets mildly thickened, Dilated aortic root 3.9 cm.      HOLTER 3/14/18: SR with ST/SB. Rare PACs with pairs. Rare PVCs with couplets.      CTS 11/20/17: Pt known to us, she is a candidate for surgery but wants to have second opinion. Will see her again if she is agreeable to surgery. Yifan Valentin MD     CTS 9/20/17: Pt seen and examined, she is a candidate for CABG and LVA resection. Pt wants to go home and come back for surgery, discussed with Dr. Brian Thayer, will get PFTs, carotid dopplers and if she has no CP or dyspnea on walking around in the unit, she could go home and come back for early surgery. Yifan Valentin MD     STRESS 9/18/17: Findings concerning for infarct of the apex, lateral and anterior walls. No reversible defect. EF 27%. ECHO 10/30/18  CONCLUSIONS    Summary  Left ventricle is normal in size with moderately reduced systolic function. Multiple wall motion abnormalities, with inferior wall akinesis. Estimated ejection fraction is 30-35% . Evidence of advanced diastolic dysfunction. Left atrium is mildly dilated. Aortic sclerosis without stenosis. Mild aortic insufficiency. Mild mitral regurgitation. Aortic root is in the upper limits of normal in size. 1.History of coronary artery disease with PCI of the LAD and left circ. before with admission to the hospital on October 17, 2014, with inferior ST elevation myocardial infarction. She did have coronary angiography done which showed the left main to be normal, LAD had patent stents, left circ. had patent stents, RCA had proximal stent with 40% instent restenosis, the mid segment had 100% occlusion, which was reduced to 0% using 2.75 x 18 mm drug-eluting stent. Her left ventricular ejection fraction was 50%. She has been maintained on aspirin, Lipitor, lisinopril, metoprolol and Plavix since then.  Her last lipid profile was done on October 18, 2014, and her LDL was 126, her HDL was 31 and her triglyceride was 84. 2.History of hypertension. 3.History of hyperlipidemia. 4.History of chronic smoking. I did  her regarding stopping smoking. 5.Stress test 11/19/2015: Stable appearing large anterior wall fixed defect compatible with patients known infarction and essentially unchanged as compared to prior study. EF 58%. Objective:   Vitals: BP (!) 153/105   Pulse 76   Temp 97.6 °F (36.4 °C) (Oral)   Resp 16   Ht 5' 1\" (1.549 m)   Wt 163 lb 8 oz (74.2 kg)   SpO2 96%   BMI 30.89 kg/m²   General appearance: alert and cooperative with exam  HEENT: Head: Normocephalic, no lesions, without obvious abnormality. Neck: no JVD, trachea midline, no adenopathy  Lungs: Diminished to auscultation throughout, course bases bilaterally, on RA   Heart: Regular rate and rhythm, s1/s2 auscultated, no murmurs. SR-ST upper 90s to low 100 at rest  Abdomen: soft, non-tender, bowel sounds active  Extremities: no edema  Neurologic: not done        Assessment / Acute Cardiac Problems:   1. Acute on chronic systolic CHF  2.  Sinus melani    Patient Active Problem List:     CAD (coronary artery disease)     Chronic bronchitis (HCC)     Smoker     GERD (gastroesophageal reflux disease)     Thoracic scoliosis     Ganglion cyst     CAD S/P percutaneous coronary angioplasty (4/2013-Dr. Maik Jose)     CAD S/P percutaneous coronary angioplasty-open stent( 7/29/2013-Dr. nichols)     Chest pain     CAD (coronary artery disease)     Atypical chest pain     S/P - Mid RCA 10/17/14-Dr. nichols     S/P stents LAD, LCX and proximal RCA -      Hyperlipidemia     Chest pain      HLD (hyperlipidemia)     Chronic back pain     Precordial pain     Lumbosacral pain     Low back pain with sciatica     Intervertebral disk disease     Midline low back pain without sciatica     Hip pain     Essential hypertension     Headache in front of head     Accelerated

## 2018-10-31 NOTE — PROGRESS NOTES
[address] [facility]  Progress Note    Date:   10/31/2018  Patient name:  Lucila Castillo  Date of admission:  10/28/2018  3:06 PM  MRN:   [id number]  YOB: 1955    SUBJECTIVE/Last 24 hours update:     Patient seen and examined at the bed side. Heart rate is still in 100's. Started on labetolol. Echo stable, EF 35%, MUGA scan EF 29%, and she spoke with cardiology about consideration of AICD placement  Still having SOB events overnight requiring her to sit up in bed  No chest pain, abdominal pain, or changes in bowel habits. Notes from nursing staff and Consults had been reviewed, and the overnight progress had been checked with the nursing staff as well. HPI:   Lucila Castillo is a 58year old female with PMH significant for systolic CHF with EF 32%, HTN, HLD, NSTEMI, pre-DM, tobaccu use, GERD who presents for evaluation of shortness of breath. Albuterol did not relieve her symptoms at home and therefore she presented to the ED for evaluation. In ED patient was hypertensive 199/112, tachypneic, and tachycardic. Serial troponins negative. KFT WNL. Patient was alert without any gross focal or motor deficits or headache. BNP in 2000s, close to patients baseline. Due to her allergies to beta blockers and lasix, patient was placed on a nitro drip and HCTZ. Nitro drip was discontinued. She has had had intermittent episodes of shortness of breath throughout her stay that seem to coincide with bradycardic or tachycardic episodes. Patient has been wearing a nasal cannula PRN. She is a current smoker and does not use O2 at home.      REVIEW OF SYSTEMS:      CONSTITUTIONAL:  no fevers, no headcahes  EYES: negative for blury vision  HEENT: No headaches, No nasal congestion, no difficulty swallowing  RESPIRATORY: positive for intermittent dyspnea, no wheezing, no Cough  CARDIOVASCULAR: negative for chest pain, no palpitations  GASTROINTESTINAL: no LYNDON Islas - CNP   10 mL at 10/30/18 2000    heparin flush (100 units/mL) injection 100 Units  100 Units Intercatheter PRN LYNDON Melgar - CNP   100 Units at 10/30/18 1300    ondansetron (ZOFRAN) injection 4 mg  4 mg Intravenous Q6H PRN Lucien Montiel MD        spironolactone (ALDACTONE) tablet 25 mg  25 mg Oral Daily Melvin Britt MD   25 mg at 10/30/18 0902    bumetanide (BUMEX) tablet 1 mg  1 mg Oral Daily Melvin Britt MD   1 mg at 10/30/18 0902    sodium chloride flush 0.9 % injection 10 mL  10 mL Intravenous 2 times per day Julian Gaffney MD   10 mL at 10/29/18 2021    sodium chloride flush 0.9 % injection 10 mL  10 mL Intravenous PRN Julian Gaffney MD        acetaminophen (TYLENOL) tablet 650 mg  650 mg Oral Q4H PRN Julian Gaffney MD   650 mg at 10/29/18 0308    enoxaparin (LOVENOX) injection 40 mg  40 mg Subcutaneous Daily Julian Gaffney MD        aspirin chewable tablet 81 mg  81 mg Oral Daily Lucien Montiel MD   81 mg at 10/30/18 0902    atorvastatin (LIPITOR) tablet 80 mg  80 mg Oral Nightly Lucien Montiel MD   80 mg at 10/30/18 2005    clopidogrel (PLAVIX) tablet 75 mg  75 mg Oral Daily Lucien Montiel MD   75 mg at 10/30/18 0902    lisinopril (PRINIVIL;ZESTRIL) tablet 2.5 mg  2.5 mg Oral Daily Lucien Montiel MD   2.5 mg at 10/30/18 0902    fluticasone (FLONASE) 50 MCG/ACT nasal spray 1 spray  1 spray Each Nare Daily Lucien Montiel MD   1 spray at 10/30/18 0902    ipratropium-albuterol (DUONEB) nebulizer solution 1 ampule  1 ampule Inhalation Q6H PRN Lucien Montiel MD           ASSESSMENT:     Principal Problem:    Heart failure ([facility]  Active Problems:    CAD (coronary artery disease)    HLD (hyperlipidemia)    Essential hypertension    Acute on chronic systolic (congestive) heart failure ([address]  Resolved Problems:    * No resolved hospital problems. *      PLAN:     Acute on chronic systolic HF, ischemic CM, EF 35%- likely exacerbation secondary to HTN.

## 2018-11-01 NOTE — PROGRESS NOTES
45 [facility]  Progress Note    Date:   11/1/2018  Patient name:  Emery Xiao  Date of admission:  10/28/2018  3:06 PM  MRN:   [id number]  YOB: 1955    SUBJECTIVE/Last 24 hours update:     Patient seen and examined at the bed side , no new acute events overnight. NPO since midnight - plan for AICD today as per cardio. Pt has no complaints. 10/31 MUGA: EF 29%  (April 2018: 35%)     Notes from nursing staff and Consults had been reviewed, and the overnight progress had been checked with the nursing staff as well. HPI:   58 y.o. Female, PMHx of systolic CHF (EF in 20/9594 -> 35%), HTN, HLD, NSTEMI, pre-DM, tobacco use, GERD, presents with SOB. Albuterol did nto relieve symptoms at home. In ED: Pt was hypertensive, 199/112, tachypneic, tachycardic. Serial troponin negative. KFT WNL. Patient was alert without any gross focal or motor deficits or headache. BNP in 2000s, close to patients baseline. Due to her allergies to beta blockers and lasix, patient was placed on a nitro drip and HCTZ.      Nitro drip was discontinued. She has had had intermittent episodes of shortness of breath throughout her stay that seem to coincide with bradycardic or tachycardic episodes. Patient wears a nasal cannula PRN. She is a current smoker and does not use O2 at home.      REVIEW OF SYSTEMS:      CONSTITUTIONAL:  no fevers, no headcahes  EYES: negative for blury vision  HEENT: No headaches, No nasal congestion, no difficulty swallowing  RESPIRATORY:negative for dyspnea, no wheezing, no Cough  CARDIOVASCULAR: negative for chest pain, no palpitations  GASTROINTESTINAL: no nausea, no vomiting, no change in bowel habits, no abdominal pain   GENITOURINARY: negative for dysuria, no hematuria   MUSCULOSKELETAL: no joint pains, no muscle aches, no swelling of joints or extremities  NEUROLOGICAL: No  Weakness or numbness      PAST MEDICAL HISTORY:      has a 4/24/18   Titi Hansen MD   ondansetron (ZOFRAN) 4 MG tablet Take 1 tablet by mouth every 8 hours as needed for Nausea or Vomiting 4/8/18   Titi Hansen MD   Blood Pressure Monitoring (SURELIFE BP MONITOR/ARM) SANDY Dx: Essential Hypertension. Monitor BP daily and log 10/2/17   Beto Stoddard MD   atorvastatin (LIPITOR) 80 MG tablet Take 1 tablet by mouth nightly 9/21/17   Tena Soto MD   aspirin 81 MG tablet Take 81 mg by mouth daily    Historical Provider, MD   NITROSTAT 0.4 MG SL tablet DISSOLVE 1 TAB UNDER TONGUE FOR CHEST PAIN - IF PAIN REMAINS AFTER 5 MIN, CALL 911 AND REPEAT DOSE. MAX 3 TABS IN 15 MINUTES 7/23/15   Cristobal Felix MD       ALLERGIES:     Latex; Coreg [carvedilol]; Lasix [furosemide]; Toprol xl [metoprolol]; Codeine; and Tape [adhesive tape]      OBJECTIVE:       Vitals:    10/31/18 1943 10/31/18 2045 10/31/18 2326 11/01/18 0801   BP: 116/82  97/68 130/71   Pulse: 99  76 80   Resp: 16   17   Temp: 97.8 °F (36.6 °C) 97.9 °F (36.6 °C)  96.6 °F (35.9 °C)   TempSrc: Oral Oral  Oral   SpO2:       Weight:       Height:             Intake/Output Summary (Last 24 hours) at 11/01/18 5002  Last data filed at 11/01/18 0859   Gross per 24 hour   Intake              850 ml   Output             1600 ml   Net             -750 ml       PHYSICAL EXAM:  General Appearance  Alert , awake , not in acute distress  HEENT - Head is normocephalic, atraumatic. Lungs - Bilateral equal air entry , no wheezes, rales or rhonchi, aeration good  Cardiovascular - Heart sounds are normal.  Regular rhythm, normal rate without murmur, gallop or rub.   Abdomen - Soft, nontender, nondistended, no masses or organomegaly  Neurologic - There are no new focal motor or sensory deficits  Skin - No bruising or bleeding on exposed skin area  Extremities - No cyanosis, clubbing or edema      DIAGNOSTICS:     Laboratory Testing:    Recent Results (from the past 24 hour(s))   CBC    Collection Time: 11/01/18  5:57 AM   Result

## 2018-11-02 NOTE — PROGRESS NOTES
BB). Discussed importance of monitoring PO fluid intake, daily weights, and medication compliance. CHF clinic referral for OP. 3. OK to discharge from cardiac standpoint. F/U in 1 week for wound check then 1 month clinician visit.     Electronically signed by Marijane Cranker, APRN - CNP on 11/2/2018 at 11:20 [address] [facility].  [phone]

## 2018-11-02 NOTE — PROGRESS NOTES
CLINICAL PHARMACY NOTE: MEDS TO [address] Select Patient?: Yes  Total # of Prescriptions Filled: 4   The following medications were delivered to the patient:  · Bumetanide  · labetolol  · Spironolactone  · cephalexin  Total # of Interventions Completed: 0  Time Spent (min): 0    Additional Documentation: medications were delivered to the patients room at 12:40 pm on 11. 2.18 patient was not happy with the medications. I contacted the nurse and expressed her concern and nurse said she would go talk to her.

## 2018-11-02 NOTE — DISCHARGE INSTR - COC
Continuity of Care Form    Patient Name: Dio Neff   :  1955  MRN:  [id number]    [facility] date:  10/28/2018  Discharge date:  ***    Code Status Order: Full Code   Advance Directives:   Advance Care Flowsheet Documentation     Date/Time Healthcare Directive Type of Healthcare Directive Copy in [address] Agent's Name Healthcare Agent's Phone Number    10/28/18 1952  No, patient does not have an advance directive for healthcare treatment -- -- -- -- --          Admitting Physician:  Jeffrey Parker MD  PCP: No primary care provider on file.     Discharging Nurse: [facility] Unit/Room#: 5635/7284-52  Discharging Unit Phone Number: ***    Emergency Contact:   Extended Emergency Contact Information  Primary Emergency Contact: Julio Cerda [address] Phone: [phone]  Relation: Child    Past Surgical History:  Past Surgical History:   Procedure Laterality Date    CORONARY ANGIOPLASTY WITH STENT PLACEMENT  2006,2013    3 stents total    CYST REMOVAL Right 2013    right foot    ENDOSCOPY, COLON, DIAGNOSTIC      stomach ulcer    HYSTERECTOMY      total    SKIN BIOPSY      neg , on back   [address]       Immunization History:   Immunization History   Administered Date(s) Administered    Pneumococcal Polysaccharide ([id number]) 2012    Tdap (Boostrix, Adacel) 2016       Active Problems:  Patient Active Problem List   Diagnosis Code    CAD (coronary artery disease) I25.10    Chronic bronchitis ([facility] [address] J42    Smoker F17.200    GERD (gastroesophageal reflux disease) K21.9    Thoracic scoliosis M41.9    Ganglion cyst M67.40    CAD S/P percutaneous coronary angioplasty (2013-Dr. Yuliet Baker) I25.10, Z98.61    CAD S/P percutaneous coronary angioplasty-open stent( 2013-Dr. Nirmala Ng) I25.10, Z98.61    Chest pain R07.9    CAD (coronary artery disease) I25.10    Atypical GXEL:586450597}  Med Delivery   [address] MED Delivery:018112765}    Wound Care Documentation and Therapy:        Elimination:  Continence:   · Bowel: {YES / ZW:79715}  · Bladder: {YES / YV:97291}  Urinary Catheter: {Urinary Catheter:489321293}   Colostomy/Ileostomy/Ileal Conduit: {YES / IC:53227}       Date of Last BM: ***    Intake/Output Summary (Last 24 hours) at 18 1100  Last data filed at 18 0353   Gross per 24 hour   Intake              360 ml   Output             1150 ml   Net             -790 ml     I/O last 3 completed shifts:   In: 360 [P.O.:360]  Out: 1450 [Urine:1450]    Safety Concerns:     [address] Safety Concerns:906580686}    Impairments/Disabilities:      [address] Impairments/Disabilities:636131674}    Nutrition Therapy:  Current Nutrition Therapy:   [address] Diet List:808290731}    Routes of Feeding: {P DME Other Feedings:007888393}  Liquids: {Slp liquid thickness:74429}  Daily Fluid Restriction: {CHP DME Yes amt example:274611920}  Last Modified Barium Swallow with Video (Video Swallowing Test): {Done Not Done YTQA:363411096}    Treatments at the Time of Hospital Discharge:   Respiratory Treatments: ***  Oxygen Therapy:  {Therapy; copd oxygen:08194}  Ventilator:    { CC Vent UUHZ:963249479}    Rehab Therapies: {THERAPEUTIC INTERVENTION:8910739468}  Weight Bearing Status/Restrictions: [employer]  Weight Bearin}  Other Medical Equipment (for information only, NOT a DME order):  {EQUIPMENT:458431812}  Other Treatments: ***    Patient's personal belongings (please select all that are sent with patient):  {[facility] DME Belongings:485596432}    RN SIGNATURE:  {Esignature:921087752}    CASE MANAGEMENT/SOCIAL WORK SECTION    Inpatient Status Date: ***    Readmission Risk Assessment Score:  Readmission Risk              Risk of Unplanned Readmission:        15           Discharging to Facility/ Agency   · Name:   · Address:  · Phone:  · Fax:    Dialysis Facility (if applicable) · Name:  · Address:  · Dialysis Schedule:  · Phone:  · Fax:    / signature: {Esignature:941125672}    PHYSICIAN SECTION    Prognosis: Fair    Condition at Discharge: Stable    Rehab Potential (if transferring to Rehab): Good    Recommended Labs or Other Treatments After Discharge: Wound check at [facility] in 7 days, device clinic in 6 months with remote interrogations every 3 months    Physician Certification: I certify the above information and transfer of Ky Major  is necessary for the continuing treatment of the diagnosis listed and that she requires Home Care for greater 30 days.      Update Admission H&P: No change in H&P    PHYSICIAN SIGNATURE:  Electronically signed by Preston Waldrop MD on 11/2/18 at 11:02 AM

## 2018-11-12 PROBLEM — Z12.39 BREAST CANCER SCREENING: Status: ACTIVE | Noted: 2018-01-01

## 2018-11-12 PROBLEM — Z87.891 PERSONAL HISTORY OF TOBACCO USE: Status: ACTIVE | Noted: 2018-01-01

## 2018-11-12 PROBLEM — Z12.2 ENCOUNTER FOR SCREENING FOR LUNG CANCER: Status: ACTIVE | Noted: 2018-01-01

## 2018-11-12 PROBLEM — Z11.59 NEED FOR HEPATITIS C SCREENING TEST: Status: ACTIVE | Noted: 2018-01-01

## 2018-11-12 PROBLEM — Z11.4 ENCOUNTER FOR SCREENING FOR HIV: Status: ACTIVE | Noted: 2018-01-01

## 2018-11-12 PROBLEM — Z12.11 COLON CANCER SCREENING: Status: ACTIVE | Noted: 2018-01-01

## 2018-11-12 PROBLEM — Z76.0 MEDICATION REFILL: Status: ACTIVE | Noted: 2018-01-01

## 2018-11-12 PROBLEM — Z13.1 DIABETES MELLITUS SCREENING: Status: ACTIVE | Noted: 2018-01-01

## 2018-11-12 NOTE — PROGRESS NOTES
Attending Physician Statement  I have discussed the care of Antoinette Roxton, including pertinent history and exam findings,  with the resident. I have reviewed the key elements of all parts of the encounter with the resident. I agree with the assessment, plan and orders as documented by the resident. (Denys Martinez) Claudell Hoff M.D  Vitals:    11/12/18 0826   BP: 120/70   Pulse: 66   Temp: 97.9 °F (36.6 °C)     1. Chronic combined systolic and diastolic heart failure ([address]    2. Essential hypertension    3. Medication refill    4. Diabetes mellitus screening    5. Breast cancer screening    6. Encounter for screening for HIV    7. Need for hepatitis C screening test    8. Colon cancer screening    9. Encounter for screening for lung cancer    10.  Personal history of tobacco use

## 2018-11-19 NOTE — TELEPHONE ENCOUNTER
Please call [address] and have them transfer the prescription to [address] [place] [address] over the phone.  Thanks and have a nice day. - Dr. Lisandra Reynolds

## 2018-12-10 NOTE — TELEPHONE ENCOUNTER
[facility] pharmacy faxed over rx's to [facility]. Will call over to [facility] and let them know what meds patient needs refill and with updated rx. Spoke with David rios at [address] [place] [address].

## 2018-12-12 PROBLEM — Z11.59 NEED FOR HEPATITIS C SCREENING TEST: Status: RESOLVED | Noted: 2018-01-01 | Resolved: 2018-01-01

## 2018-12-12 PROBLEM — Z11.4 ENCOUNTER FOR SCREENING FOR HIV: Status: RESOLVED | Noted: 2018-01-01 | Resolved: 2018-01-01

## 2018-12-12 PROBLEM — Z12.39 BREAST CANCER SCREENING: Status: RESOLVED | Noted: 2018-01-01 | Resolved: 2018-01-01

## 2018-12-12 PROBLEM — Z13.1 DIABETES MELLITUS SCREENING: Status: RESOLVED | Noted: 2018-01-01 | Resolved: 2018-01-01

## 2018-12-12 PROBLEM — Z12.2 ENCOUNTER FOR SCREENING FOR LUNG CANCER: Status: RESOLVED | Noted: 2018-01-01 | Resolved: 2018-01-01

## 2018-12-12 PROBLEM — Z12.11 COLON CANCER SCREENING: Status: RESOLVED | Noted: 2018-01-01 | Resolved: 2018-01-01

## 2019-01-01 ENCOUNTER — HOSPITAL ENCOUNTER (OUTPATIENT)
Age: 64
Setting detail: SPECIMEN
Discharge: HOME | End: 2019-03-13
Payer: MEDICARE

## 2019-01-01 ENCOUNTER — APPOINTMENT (OUTPATIENT)
Dept: GENERAL RADIOLOGY | Age: 64
End: 2019-01-01
Payer: MEDICARE

## 2019-01-01 ENCOUNTER — APPOINTMENT (OUTPATIENT)
Dept: NUCLEAR MEDICINE | Age: 64
End: 2019-01-01
Payer: MEDICARE

## 2019-01-01 ENCOUNTER — HOSPITAL ENCOUNTER (EMERGENCY)
Age: 64
End: 2019-10-04
Attending: EMERGENCY MEDICINE
Payer: MEDICARE

## 2019-01-01 ENCOUNTER — TELEPHONE (OUTPATIENT)
Dept: FAMILY MEDICINE CLINIC | Age: 64
End: 2019-01-01

## 2019-01-01 ENCOUNTER — HOSPITAL ENCOUNTER (OUTPATIENT)
Age: 64
Setting detail: OBSERVATION
Discharge: HOME | End: 2019-04-15
Attending: EMERGENCY MEDICINE | Admitting: EMERGENCY MEDICINE
Payer: MEDICARE

## 2019-01-01 ENCOUNTER — OFFICE VISIT (OUTPATIENT)
Dept: FAMILY MEDICINE CLINIC | Age: 64
End: 2019-01-01
Payer: MEDICARE

## 2019-01-01 VITALS
TEMPERATURE: 97 F | OXYGEN SATURATION: 97 % | HEIGHT: 61 IN | RESPIRATION RATE: 18 BRPM | BODY MASS INDEX: 31.15 KG/M2 | WEIGHT: 165 LBS | HEART RATE: 72 BPM | SYSTOLIC BLOOD PRESSURE: 128 MMHG | DIASTOLIC BLOOD PRESSURE: 86 MMHG

## 2019-01-01 VITALS
BODY MASS INDEX: 29.44 KG/M2 | HEART RATE: 79 BPM | OXYGEN SATURATION: 94 % | RESPIRATION RATE: 18 BRPM | HEIGHT: 62 IN | DIASTOLIC BLOOD PRESSURE: 117 MMHG | SYSTOLIC BLOOD PRESSURE: 131 MMHG | WEIGHT: 160 LBS | TEMPERATURE: 94 F

## 2019-01-01 VITALS
WEIGHT: 163.8 LBS | DIASTOLIC BLOOD PRESSURE: 70 MMHG | HEART RATE: 80 BPM | BODY MASS INDEX: 30.95 KG/M2 | SYSTOLIC BLOOD PRESSURE: 138 MMHG | TEMPERATURE: 96 F

## 2019-01-01 DIAGNOSIS — I21.3 ST ELEVATION MYOCARDIAL INFARCTION (STEMI), UNSPECIFIED ARTERY (HCC): Primary | ICD-10-CM

## 2019-01-01 DIAGNOSIS — J01.90 ACUTE BACTERIAL SINUSITIS: Primary | ICD-10-CM

## 2019-01-01 DIAGNOSIS — J96.01 ACUTE RESPIRATORY FAILURE WITH HYPOXIA (HCC): ICD-10-CM

## 2019-01-01 DIAGNOSIS — B37.2 INTERTRIGINOUS CANDIDIASIS: ICD-10-CM

## 2019-01-01 DIAGNOSIS — J44.1 COPD EXACERBATION (HCC): Primary | ICD-10-CM

## 2019-01-01 DIAGNOSIS — R53.83 FATIGUE, UNSPECIFIED TYPE: ICD-10-CM

## 2019-01-01 DIAGNOSIS — B96.89 ACUTE BACTERIAL SINUSITIS: Primary | ICD-10-CM

## 2019-01-01 LAB
-: ABNORMAL
ABSOLUTE EOS #: 0.23 K/UL (ref 0–0.4)
ABSOLUTE EOS #: 0.23 K/UL (ref 0–0.44)
ABSOLUTE EOS #: 0.28 K/UL (ref 0–0.44)
ABSOLUTE IMMATURE GRANULOCYTE: 0 K/UL (ref 0–0.3)
ABSOLUTE IMMATURE GRANULOCYTE: 0.04 K/UL (ref 0–0.3)
ABSOLUTE IMMATURE GRANULOCYTE: <0.03 K/UL (ref 0–0.3)
ABSOLUTE LYMPH #: 2.65 K/UL (ref 1.1–3.7)
ABSOLUTE LYMPH #: 2.8 K/UL (ref 1.1–3.7)
ABSOLUTE LYMPH #: 7.3 K/UL (ref 1–4.8)
ABSOLUTE MONO #: 0.5 K/UL (ref 0.1–1.2)
ABSOLUTE MONO #: 0.7 K/UL (ref 0.1–0.8)
ABSOLUTE MONO #: 0.91 K/UL (ref 0.1–1.2)
ALBUMIN SERPL-MCNC: 4.2 G/DL (ref 3.5–5.2)
ALBUMIN SERPL-MCNC: 4.2 G/DL (ref 3.5–5.2)
ALBUMIN/GLOBULIN RATIO: 1.1 (ref 1–2.5)
ALBUMIN/GLOBULIN RATIO: 1.3 (ref 1–2.5)
ALLEN TEST: ABNORMAL
ALP BLD-CCNC: 109 U/L (ref 35–104)
ALP BLD-CCNC: 117 U/L (ref 35–104)
ALT SERPL-CCNC: 13 U/L (ref 5–33)
ALT SERPL-CCNC: 25 U/L (ref 5–33)
AMORPHOUS: ABNORMAL
ANION GAP SERPL CALCULATED.3IONS-SCNC: 12 MMOL/L (ref 9–17)
ANION GAP SERPL CALCULATED.3IONS-SCNC: 16 MMOL/L (ref 9–17)
ANION GAP SERPL CALCULATED.3IONS-SCNC: 22 MMOL/L (ref 9–17)
ANION GAP: 12 MMOL/L (ref 7–16)
AST SERPL-CCNC: 18 U/L
AST SERPL-CCNC: 43 U/L
BACTERIA: ABNORMAL
BASOPHILS # BLD: 0 % (ref 0–2)
BASOPHILS # BLD: 1 % (ref 0–2)
BASOPHILS # BLD: 1 % (ref 0–2)
BASOPHILS ABSOLUTE: 0.03 K/UL (ref 0–0.2)
BASOPHILS ABSOLUTE: 0.07 K/UL (ref 0–0.2)
BASOPHILS ABSOLUTE: 0.12 K/UL (ref 0–0.2)
BILIRUB SERPL-MCNC: 0.27 MG/DL (ref 0.3–1.2)
BILIRUB SERPL-MCNC: 0.6 MG/DL (ref 0.3–1.2)
BILIRUBIN URINE: NEGATIVE
BNP INTERPRETATION: ABNORMAL
BUN BLDV-MCNC: 15 MG/DL (ref 8–23)
BUN BLDV-MCNC: 16 MG/DL (ref 8–23)
BUN BLDV-MCNC: 18 MG/DL (ref 8–23)
BUN/CREAT BLD: ABNORMAL (ref 9–20)
CALCIUM IONIZED: 1.21 MMOL/L (ref 1.13–1.33)
CALCIUM SERPL-MCNC: 8.8 MG/DL (ref 8.6–10.4)
CALCIUM SERPL-MCNC: 9.5 MG/DL (ref 8.6–10.4)
CALCIUM SERPL-MCNC: 9.7 MG/DL (ref 8.6–10.4)
CASTS UA: ABNORMAL /LPF (ref 0–8)
CHLORIDE BLD-SCNC: 100 MMOL/L (ref 98–107)
CHLORIDE BLD-SCNC: 102 MMOL/L (ref 98–107)
CHLORIDE BLD-SCNC: 104 MMOL/L (ref 98–107)
CO2: 15 MMOL/L (ref 20–31)
CO2: 20 MMOL/L (ref 20–31)
CO2: 21 MMOL/L (ref 20–31)
COLOR: YELLOW
CREAT SERPL-MCNC: 0.64 MG/DL (ref 0.5–0.9)
CREAT SERPL-MCNC: 0.68 MG/DL (ref 0.5–0.9)
CREAT SERPL-MCNC: 0.77 MG/DL (ref 0.5–0.9)
CRYSTALS, UA: ABNORMAL /HPF
DIFFERENTIAL TYPE: ABNORMAL
EKG ATRIAL RATE: 75 BPM
EKG ATRIAL RATE: 75 BPM
EKG P AXIS: 39 DEGREES
EKG P AXIS: 48 DEGREES
EKG P-R INTERVAL: 142 MS
EKG P-R INTERVAL: 146 MS
EKG Q-T INTERVAL: 434 MS
EKG Q-T INTERVAL: 462 MS
EKG QRS DURATION: 120 MS
EKG QRS DURATION: 134 MS
EKG QTC CALCULATION (BAZETT): 484 MS
EKG QTC CALCULATION (BAZETT): 515 MS
EKG R AXIS: -23 DEGREES
EKG R AXIS: -24 DEGREES
EKG T AXIS: 59 DEGREES
EKG T AXIS: 68 DEGREES
EKG VENTRICULAR RATE: 75 BPM
EKG VENTRICULAR RATE: 75 BPM
EOSINOPHILS RELATIVE PERCENT: 2 % (ref 1–4)
EOSINOPHILS RELATIVE PERCENT: 3 % (ref 1–4)
EOSINOPHILS RELATIVE PERCENT: 3 % (ref 1–4)
EPITHELIAL CELLS UA: ABNORMAL /HPF (ref 0–5)
FIO2: ABNORMAL
GFR AFRICAN AMERICAN: >60 ML/MIN
GFR NON-AFRICAN AMERICAN: 55 ML/MIN
GFR NON-AFRICAN AMERICAN: >60 ML/MIN
GFR SERPL CREATININE-BSD FRML MDRD: >60 ML/MIN
GFR SERPL CREATININE-BSD FRML MDRD: ABNORMAL ML/MIN/{1.73_M2}
GLUCOSE BLD-MCNC: 101 MG/DL (ref 70–99)
GLUCOSE BLD-MCNC: 205 MG/DL (ref 70–99)
GLUCOSE BLD-MCNC: 222 MG/DL
GLUCOSE BLD-MCNC: 222 MG/DL (ref 74–100)
GLUCOSE BLD-MCNC: 84 MG/DL (ref 70–99)
GLUCOSE URINE: NEGATIVE
HCO3 VENOUS: 17.6 MMOL/L (ref 22–29)
HCT VFR BLD CALC: 45.7 % (ref 36.3–47.1)
HCT VFR BLD CALC: 47.1 % (ref 36.3–47.1)
HCT VFR BLD CALC: 51.3 % (ref 36.3–47.1)
HEMOGLOBIN: 14.7 G/DL (ref 11.9–15.1)
HEMOGLOBIN: 15.5 G/DL (ref 11.9–15.1)
HEMOGLOBIN: 16 G/DL (ref 11.9–15.1)
IMMATURE GRANULOCYTES: 0 %
IMMATURE GRANULOCYTES: 0 %
IMMATURE GRANULOCYTES: 1 %
INR BLD: 1
KETONES, URINE: NEGATIVE
LEUKOCYTE ESTERASE, URINE: NEGATIVE
LV EF: 25 %
LVEF MODALITY: NORMAL
LYMPHOCYTES # BLD: 31 % (ref 24–43)
LYMPHOCYTES # BLD: 39 % (ref 24–43)
LYMPHOCYTES # BLD: 63 % (ref 24–44)
MCH RBC QN AUTO: 29.8 PG (ref 25.2–33.5)
MCH RBC QN AUTO: 29.9 PG (ref 25.2–33.5)
MCH RBC QN AUTO: 30.1 PG (ref 25.2–33.5)
MCHC RBC AUTO-ENTMCNC: 31.2 G/DL (ref 28.4–34.8)
MCHC RBC AUTO-ENTMCNC: 32.2 G/DL (ref 28.4–34.8)
MCHC RBC AUTO-ENTMCNC: 32.9 G/DL (ref 28.4–34.8)
MCV RBC AUTO: 90.9 FL (ref 82.6–102.9)
MCV RBC AUTO: 92.7 FL (ref 82.6–102.9)
MCV RBC AUTO: 96.4 FL (ref 82.6–102.9)
MODE: ABNORMAL
MONOCYTES # BLD: 11 % (ref 3–12)
MONOCYTES # BLD: 6 % (ref 1–7)
MONOCYTES # BLD: 7 % (ref 3–12)
MORPHOLOGY: NORMAL
MUCUS: ABNORMAL
NEGATIVE BASE EXCESS, VEN: 13 (ref 0–2)
NITRITE, URINE: NEGATIVE
NRBC AUTOMATED: 0 PER 100 WBC
O2 DEVICE/FLOW/%: ABNORMAL
O2 SAT, VEN: 59 % (ref 60–85)
OTHER OBSERVATIONS UA: ABNORMAL
PARTIAL THROMBOPLASTIN TIME: 24 SEC (ref 20.5–30.5)
PATIENT TEMP: ABNORMAL
PCO2, VEN: 55.6 MM HG (ref 41–51)
PDW BLD-RTO: 13.5 % (ref 11.8–14.4)
PDW BLD-RTO: 13.8 % (ref 11.8–14.4)
PDW BLD-RTO: 14.1 % (ref 11.8–14.4)
PH UA: 7.5 (ref 5–8)
PH VENOUS: 7.11 (ref 7.32–7.43)
PLATELET # BLD: 276 K/UL (ref 138–453)
PLATELET # BLD: 347 K/UL (ref 138–453)
PLATELET # BLD: ABNORMAL K/UL (ref 138–453)
PLATELET ESTIMATE: ABNORMAL
PLATELET, FLUORESCENCE: NORMAL K/UL (ref 138–453)
PLATELET, IMMATURE FRACTION: NORMAL % (ref 1.1–10.3)
PMV BLD AUTO: 10.2 FL (ref 8.1–13.5)
PMV BLD AUTO: 9.7 FL (ref 8.1–13.5)
PMV BLD AUTO: ABNORMAL FL (ref 8.1–13.5)
PO2, VEN: 41.5 MM HG (ref 30–50)
POC CHLORIDE: 108 MMOL/L (ref 98–107)
POC CREATININE: 1.01 MG/DL (ref 0.51–1.19)
POC HEMATOCRIT: 55 % (ref 36–46)
POC HEMOGLOBIN: 18.9 G/DL (ref 12–16)
POC IONIZED CALCIUM: 1.11 MMOL/L (ref 1.15–1.33)
POC LACTIC ACID: 8.15 MMOL/L (ref 0.56–1.39)
POC PCO2 TEMP: ABNORMAL MM HG
POC PH TEMP: ABNORMAL
POC PO2 TEMP: ABNORMAL MM HG
POC POTASSIUM: 4.8 MMOL/L (ref 3.5–4.5)
POC SODIUM: 138 MMOL/L (ref 138–146)
POSITIVE BASE EXCESS, VEN: ABNORMAL (ref 0–3)
POTASSIUM SERPL-SCNC: 4.5 MMOL/L (ref 3.7–5.3)
POTASSIUM SERPL-SCNC: 4.9 MMOL/L (ref 3.7–5.3)
POTASSIUM SERPL-SCNC: 5 MMOL/L (ref 3.7–5.3)
PRO-BNP: 2482 PG/ML
PROTEIN UA: ABNORMAL
PROTHROMBIN TIME: 10.5 SEC (ref 9–12)
RBC # BLD: 4.93 M/UL (ref 3.95–5.11)
RBC # BLD: 5.18 M/UL (ref 3.95–5.11)
RBC # BLD: 5.32 M/UL (ref 3.95–5.11)
RBC # BLD: ABNORMAL 10*6/UL
RBC UA: ABNORMAL /HPF (ref 0–4)
RENAL EPITHELIAL, UA: ABNORMAL /HPF
SAMPLE SITE: ABNORMAL
SEG NEUTROPHILS: 28 % (ref 36–66)
SEG NEUTROPHILS: 51 % (ref 36–65)
SEG NEUTROPHILS: 53 % (ref 36–65)
SEGMENTED NEUTROPHILS ABSOLUTE COUNT: 3.25 K/UL (ref 1.8–7.7)
SEGMENTED NEUTROPHILS ABSOLUTE COUNT: 3.66 K/UL (ref 1.5–8.1)
SEGMENTED NEUTROPHILS ABSOLUTE COUNT: 4.61 K/UL (ref 1.5–8.1)
SODIUM BLD-SCNC: 137 MMOL/L (ref 135–144)
SODIUM BLD-SCNC: 137 MMOL/L (ref 135–144)
SODIUM BLD-SCNC: 138 MMOL/L (ref 135–144)
SPECIFIC GRAVITY UA: 1.01 (ref 1–1.03)
TOTAL CO2, VENOUS: 19 MMOL/L (ref 23–30)
TOTAL PROTEIN: 7.5 G/DL (ref 6.4–8.3)
TOTAL PROTEIN: 8 G/DL (ref 6.4–8.3)
TRICHOMONAS: ABNORMAL
TROPONIN INTERP: ABNORMAL
TROPONIN T: ABNORMAL NG/ML
TROPONIN, HIGH SENSITIVITY: 18 NG/L (ref 0–14)
TROPONIN, HIGH SENSITIVITY: 18 NG/L (ref 0–14)
TROPONIN, HIGH SENSITIVITY: 19 NG/L (ref 0–14)
TSH SERPL DL<=0.05 MIU/L-ACNC: 0.76 MIU/L (ref 0.3–5)
TURBIDITY: CLEAR
URINE HGB: NEGATIVE
UROBILINOGEN, URINE: NORMAL
WBC # BLD: 11.6 K/UL (ref 3.5–11.3)
WBC # BLD: 7.2 K/UL (ref 3.5–11.3)
WBC # BLD: 8.6 K/UL (ref 3.5–11.3)
WBC # BLD: ABNORMAL 10*3/UL
WBC UA: ABNORMAL /HPF (ref 0–5)
YEAST: ABNORMAL

## 2019-01-01 PROCEDURE — 85730 THROMBOPLASTIN TIME PARTIAL: CPT

## 2019-01-01 PROCEDURE — 2700000000 HC OXYGEN THERAPY PER DAY

## 2019-01-01 PROCEDURE — 93005 ELECTROCARDIOGRAM TRACING: CPT

## 2019-01-01 PROCEDURE — 83880 ASSAY OF NATRIURETIC PEPTIDE: CPT

## 2019-01-01 PROCEDURE — 85610 PROTHROMBIN TIME: CPT

## 2019-01-01 PROCEDURE — 2500000003 HC RX 250 WO HCPCS

## 2019-01-01 PROCEDURE — 2500000003 HC RX 250 WO HCPCS: Performed by: EMERGENCY MEDICINE

## 2019-01-01 PROCEDURE — C1769 GUIDE WIRE: HCPCS

## 2019-01-01 PROCEDURE — 3017F COLORECTAL CA SCREEN DOC REV: CPT | Performed by: FAMILY MEDICINE

## 2019-01-01 PROCEDURE — 82330 ASSAY OF CALCIUM: CPT

## 2019-01-01 PROCEDURE — G8484 FLU IMMUNIZE NO ADMIN: HCPCS | Performed by: FAMILY MEDICINE

## 2019-01-01 PROCEDURE — 84295 ASSAY OF SERUM SODIUM: CPT

## 2019-01-01 PROCEDURE — 78452 HT MUSCLE IMAGE SPECT MULT: CPT

## 2019-01-01 PROCEDURE — 92950 HEART/LUNG RESUSCITATION CPR: CPT | Performed by: INTERNAL MEDICINE

## 2019-01-01 PROCEDURE — 71046 X-RAY EXAM CHEST 2 VIEWS: CPT

## 2019-01-01 PROCEDURE — 81001 URINALYSIS AUTO W/SCOPE: CPT

## 2019-01-01 PROCEDURE — 2580000003 HC RX 258: Performed by: EMERGENCY MEDICINE

## 2019-01-01 PROCEDURE — 94660 CPAP INITIATION&MGMT: CPT

## 2019-01-01 PROCEDURE — 4004F PT TOBACCO SCREEN RCVD TLK: CPT | Performed by: FAMILY MEDICINE

## 2019-01-01 PROCEDURE — 6370000000 HC RX 637 (ALT 250 FOR IP): Performed by: STUDENT IN AN ORGANIZED HEALTH CARE EDUCATION/TRAINING PROGRAM

## 2019-01-01 PROCEDURE — 6360000002 HC RX W HCPCS

## 2019-01-01 PROCEDURE — 99291 CRITICAL CARE FIRST HOUR: CPT

## 2019-01-01 PROCEDURE — 2780000010 HC IMPLANT OTHER

## 2019-01-01 PROCEDURE — 2580000003 HC RX 258: Performed by: INTERNAL MEDICINE

## 2019-01-01 PROCEDURE — 31500 INSERT EMERGENCY AIRWAY: CPT

## 2019-01-01 PROCEDURE — G0378 HOSPITAL OBSERVATION PER HR: HCPCS

## 2019-01-01 PROCEDURE — 87040 BLOOD CULTURE FOR BACTERIA: CPT

## 2019-01-01 PROCEDURE — 51702 INSERT TEMP BLADDER CATH: CPT

## 2019-01-01 PROCEDURE — 6360000002 HC RX W HCPCS: Performed by: INTERNAL MEDICINE

## 2019-01-01 PROCEDURE — C1725 CATH, TRANSLUMIN NON-LASER: HCPCS

## 2019-01-01 PROCEDURE — 82435 ASSAY OF BLOOD CHLORIDE: CPT

## 2019-01-01 PROCEDURE — C1894 INTRO/SHEATH, NON-LASER: HCPCS

## 2019-01-01 PROCEDURE — 2709999900 HC NON-CHARGEABLE SUPPLY

## 2019-01-01 PROCEDURE — 94761 N-INVAS EAR/PLS OXIMETRY MLT: CPT

## 2019-01-01 PROCEDURE — 6370000000 HC RX 637 (ALT 250 FOR IP): Performed by: EMERGENCY MEDICINE

## 2019-01-01 PROCEDURE — 94770 HC ETCO2 MONITOR DAILY: CPT

## 2019-01-01 PROCEDURE — 99285 EMERGENCY DEPT VISIT HI MDM: CPT

## 2019-01-01 PROCEDURE — G8417 CALC BMI ABV UP PARAM F/U: HCPCS | Performed by: FAMILY MEDICINE

## 2019-01-01 PROCEDURE — 71045 X-RAY EXAM CHEST 1 VIEW: CPT

## 2019-01-01 PROCEDURE — 82947 ASSAY GLUCOSE BLOOD QUANT: CPT

## 2019-01-01 PROCEDURE — 93017 CV STRESS TEST TRACING ONLY: CPT | Performed by: NURSE PRACTITIONER

## 2019-01-01 PROCEDURE — 96374 THER/PROPH/DIAG INJ IV PUSH: CPT

## 2019-01-01 PROCEDURE — 6360000002 HC RX W HCPCS: Performed by: EMERGENCY MEDICINE

## 2019-01-01 PROCEDURE — G8598 ASA/ANTIPLAT THER USED: HCPCS | Performed by: FAMILY MEDICINE

## 2019-01-01 PROCEDURE — 33216 INSERT 1 ELECTRODE PM-DEFIB: CPT | Performed by: INTERNAL MEDICINE

## 2019-01-01 PROCEDURE — 3430000000 HC RX DIAGNOSTIC RADIOPHARMACEUTICAL: Performed by: INTERNAL MEDICINE

## 2019-01-01 PROCEDURE — 84132 ASSAY OF SERUM POTASSIUM: CPT

## 2019-01-01 PROCEDURE — 93454 CORONARY ARTERY ANGIO S&I: CPT | Performed by: INTERNAL MEDICINE

## 2019-01-01 PROCEDURE — 6360000004 HC RX CONTRAST MEDICATION

## 2019-01-01 PROCEDURE — 85014 HEMATOCRIT: CPT

## 2019-01-01 PROCEDURE — A9500 TC99M SESTAMIBI: HCPCS | Performed by: INTERNAL MEDICINE

## 2019-01-01 PROCEDURE — 2500000003 HC RX 250 WO HCPCS: Performed by: INTERNAL MEDICINE

## 2019-01-01 PROCEDURE — 82803 BLOOD GASES ANY COMBINATION: CPT

## 2019-01-01 PROCEDURE — 85055 RETICULATED PLATELET ASSAY: CPT

## 2019-01-01 PROCEDURE — 83605 ASSAY OF LACTIC ACID: CPT

## 2019-01-01 PROCEDURE — 84484 ASSAY OF TROPONIN QUANT: CPT

## 2019-01-01 PROCEDURE — 99213 OFFICE O/P EST LOW 20 MIN: CPT | Performed by: FAMILY MEDICINE

## 2019-01-01 PROCEDURE — 85025 COMPLETE CBC W/AUTO DIFF WBC: CPT

## 2019-01-01 PROCEDURE — 82565 ASSAY OF CREATININE: CPT

## 2019-01-01 PROCEDURE — 80048 BASIC METABOLIC PNL TOTAL CA: CPT

## 2019-01-01 PROCEDURE — 93005 ELECTROCARDIOGRAM TRACING: CPT | Performed by: EMERGENCY MEDICINE

## 2019-01-01 PROCEDURE — 80053 COMPREHEN METABOLIC PANEL: CPT

## 2019-01-01 PROCEDURE — G8427 DOCREV CUR MEDS BY ELIG CLIN: HCPCS | Performed by: FAMILY MEDICINE

## 2019-01-01 PROCEDURE — 87086 URINE CULTURE/COLONY COUNT: CPT

## 2019-01-01 PROCEDURE — 2580000003 HC RX 258

## 2019-01-01 RX ORDER — BUMETANIDE 0.25 MG/ML
0.5 INJECTION, SOLUTION INTRAMUSCULAR; INTRAVENOUS ONCE
Status: DISCONTINUED | OUTPATIENT
Start: 2019-01-01 | End: 2019-01-01

## 2019-01-01 RX ORDER — ASPIRIN 300 MG/1
600 SUPPOSITORY RECTAL ONCE
Status: COMPLETED | OUTPATIENT
Start: 2019-01-01 | End: 2019-01-01

## 2019-01-01 RX ORDER — SODIUM CHLORIDE 0.9 % (FLUSH) 0.9 %
10 SYRINGE (ML) INJECTION PRN
Status: DISCONTINUED | OUTPATIENT
Start: 2019-01-01 | End: 2019-01-01 | Stop reason: HOSPADM

## 2019-01-01 RX ORDER — CLOPIDOGREL BISULFATE 75 MG/1
75 TABLET ORAL DAILY
Status: DISCONTINUED | OUTPATIENT
Start: 2019-01-01 | End: 2019-01-01 | Stop reason: HOSPADM

## 2019-01-01 RX ORDER — ACETAMINOPHEN 325 MG/1
650 TABLET ORAL EVERY 4 HOURS PRN
Status: DISCONTINUED | OUTPATIENT
Start: 2019-01-01 | End: 2019-01-01 | Stop reason: HOSPADM

## 2019-01-01 RX ORDER — SODIUM CHLORIDE 9 MG/ML
INJECTION, SOLUTION INTRAVENOUS ONCE
Status: DISCONTINUED | OUTPATIENT
Start: 2019-01-01 | End: 2019-01-01

## 2019-01-01 RX ORDER — NITROGLYCERIN 20 MG/100ML
INJECTION INTRAVENOUS
Status: DISCONTINUED
Start: 2019-01-01 | End: 2019-10-05 | Stop reason: HOSPADM

## 2019-01-01 RX ORDER — MIDAZOLAM HYDROCHLORIDE 1 MG/ML
INJECTION INTRAMUSCULAR; INTRAVENOUS
Status: DISCONTINUED
Start: 2019-01-01 | End: 2019-10-05 | Stop reason: HOSPADM

## 2019-01-01 RX ORDER — AMOXICILLIN AND CLAVULANATE POTASSIUM 875; 125 MG/1; MG/1
1 TABLET, FILM COATED ORAL 2 TIMES DAILY
Qty: 20 TABLET | Refills: 0 | Status: SHIPPED | OUTPATIENT
Start: 2019-01-01 | End: 2019-01-01

## 2019-01-01 RX ORDER — ASPIRIN 300 MG/1
SUPPOSITORY RECTAL
Status: DISCONTINUED
Start: 2019-01-01 | End: 2019-10-05 | Stop reason: HOSPADM

## 2019-01-01 RX ORDER — UREA 10 %
3 LOTION (ML) TOPICAL NIGHTLY PRN
Status: DISCONTINUED | OUTPATIENT
Start: 2019-01-01 | End: 2019-01-01 | Stop reason: HOSPADM

## 2019-01-01 RX ORDER — ALBUTEROL SULFATE 90 UG/1
2 AEROSOL, METERED RESPIRATORY (INHALATION) EVERY 6 HOURS PRN
Qty: 1 INHALER | Refills: 3 | Status: SHIPPED | OUTPATIENT
Start: 2019-01-01

## 2019-01-01 RX ORDER — KETOCONAZOLE 20 MG/G
CREAM TOPICAL
Qty: 30 G | Refills: 1 | Status: ON HOLD | OUTPATIENT
Start: 2019-01-01 | End: 2019-01-01 | Stop reason: ALTCHOICE

## 2019-01-01 RX ORDER — SODIUM CHLORIDE 0.9 % (FLUSH) 0.9 %
10 SYRINGE (ML) INJECTION EVERY 12 HOURS SCHEDULED
Status: DISCONTINUED | OUTPATIENT
Start: 2019-01-01 | End: 2019-01-01 | Stop reason: HOSPADM

## 2019-01-01 RX ORDER — CETIRIZINE HYDROCHLORIDE 10 MG/1
10 TABLET ORAL DAILY
Status: DISCONTINUED | OUTPATIENT
Start: 2019-01-01 | End: 2019-01-01 | Stop reason: HOSPADM

## 2019-01-01 RX ORDER — BUMETANIDE 0.25 MG/ML
1 INJECTION, SOLUTION INTRAMUSCULAR; INTRAVENOUS DAILY
Status: DISCONTINUED | OUTPATIENT
Start: 2019-01-01 | End: 2019-01-01 | Stop reason: HOSPADM

## 2019-01-01 RX ORDER — NITROGLYCERIN 0.4 MG/1
0.4 TABLET SUBLINGUAL EVERY 5 MIN PRN
Status: DISCONTINUED | OUTPATIENT
Start: 2019-01-01 | End: 2019-01-01

## 2019-01-01 RX ORDER — AMINOPHYLLINE DIHYDRATE 25 MG/ML
100 INJECTION, SOLUTION INTRAVENOUS
Status: DISCONTINUED | OUTPATIENT
Start: 2019-01-01 | End: 2019-01-01

## 2019-01-01 RX ORDER — PROPOFOL 10 MG/ML
INJECTION, EMULSION INTRAVENOUS
Status: DISCONTINUED
Start: 2019-01-01 | End: 2019-10-05 | Stop reason: HOSPADM

## 2019-01-01 RX ORDER — ALBUTEROL SULFATE 2.5 MG/3ML
2.5 SOLUTION RESPIRATORY (INHALATION)
Status: DISCONTINUED | OUTPATIENT
Start: 2019-01-01 | End: 2019-01-01

## 2019-01-01 RX ORDER — FENTANYL CITRATE 50 UG/ML
INJECTION, SOLUTION INTRAMUSCULAR; INTRAVENOUS
Status: DISCONTINUED
Start: 2019-01-01 | End: 2019-10-05 | Stop reason: HOSPADM

## 2019-01-01 RX ORDER — ONDANSETRON 2 MG/ML
4 INJECTION INTRAMUSCULAR; INTRAVENOUS EVERY 8 HOURS PRN
Status: DISCONTINUED | OUTPATIENT
Start: 2019-01-01 | End: 2019-01-01 | Stop reason: HOSPADM

## 2019-01-01 RX ORDER — LISINOPRIL 2.5 MG/1
2.5 TABLET ORAL DAILY
Status: DISCONTINUED | OUTPATIENT
Start: 2019-01-01 | End: 2019-01-01

## 2019-01-01 RX ORDER — ALBUTEROL SULFATE 90 UG/1
2 AEROSOL, METERED RESPIRATORY (INHALATION) EVERY 4 HOURS PRN
Status: DISCONTINUED | OUTPATIENT
Start: 2019-01-01 | End: 2019-01-01 | Stop reason: HOSPADM

## 2019-01-01 RX ORDER — FLUTICASONE PROPIONATE 50 MCG
1 SPRAY, SUSPENSION (ML) NASAL DAILY
Status: DISCONTINUED | OUTPATIENT
Start: 2019-01-01 | End: 2019-01-01 | Stop reason: HOSPADM

## 2019-01-01 RX ORDER — SODIUM CHLORIDE 0.9 % (FLUSH) 0.9 %
10 SYRINGE (ML) INJECTION PRN
Status: DISCONTINUED | OUTPATIENT
Start: 2019-01-01 | End: 2019-01-01

## 2019-01-01 RX ORDER — METOPROLOL TARTRATE 5 MG/5ML
2.5 INJECTION INTRAVENOUS PRN
Status: DISCONTINUED | OUTPATIENT
Start: 2019-01-01 | End: 2019-01-01

## 2019-01-01 RX ADMIN — EPINEPHRINE 1 MG: 0.1 INJECTION INTRACARDIAC; INTRAVENOUS at 16:02

## 2019-01-01 RX ADMIN — ASPIRIN 600 MG: 300 SUPPOSITORY RECTAL at 15:43

## 2019-01-01 RX ADMIN — Medication 50 MEQ: at 16:01

## 2019-01-01 RX ADMIN — Medication 3 MG: at 01:45

## 2019-01-01 RX ADMIN — CETIRIZINE HYDROCHLORIDE 10 MG: 10 TABLET ORAL at 14:13

## 2019-01-01 RX ADMIN — FLUTICASONE PROPIONATE 1 SPRAY: 50 SPRAY, METERED NASAL at 23:57

## 2019-01-01 RX ADMIN — CETIRIZINE HYDROCHLORIDE 10 MG: 10 TABLET ORAL at 23:31

## 2019-01-01 RX ADMIN — CLOPIDOGREL 75 MG: 75 TABLET, FILM COATED ORAL at 14:13

## 2019-01-01 RX ADMIN — TETRAKIS(2-METHOXYISOBUTYLISOCYANIDE)COPPER(I) TETRAFLUOROBORATE 15.5 MILLICURIE: 1 INJECTION, POWDER, LYOPHILIZED, FOR SOLUTION INTRAVENOUS at 09:50

## 2019-01-01 RX ADMIN — NOREPINEPHRINE BITARTRATE 10 MCG/MIN: 1 INJECTION INTRAVENOUS at 15:49

## 2019-01-01 RX ADMIN — BUMETANIDE 1 MG: 0.25 INJECTION INTRAMUSCULAR; INTRAVENOUS at 14:14

## 2019-01-01 RX ADMIN — TETRAKIS(2-METHOXYISOBUTYLISOCYANIDE)COPPER(I) TETRAFLUOROBORATE 40 MILLICURIE: 1 INJECTION, POWDER, LYOPHILIZED, FOR SOLUTION INTRAVENOUS at 11:28

## 2019-01-01 RX ADMIN — Medication 10 ML: at 21:35

## 2019-01-01 RX ADMIN — Medication 50 MEQ: at 16:04

## 2019-01-01 RX ADMIN — REGADENOSON 0.4 MG: 0.08 INJECTION, SOLUTION INTRAVENOUS at 11:25

## 2019-01-01 RX ADMIN — SODIUM CHLORIDE, PRESERVATIVE FREE 10 ML: 5 INJECTION INTRAVENOUS at 09:50

## 2019-01-01 RX ADMIN — SODIUM CHLORIDE, PRESERVATIVE FREE 10 ML: 5 INJECTION INTRAVENOUS at 11:28

## 2019-01-01 RX ADMIN — Medication 10 ML: at 11:08

## 2019-01-01 ASSESSMENT — ENCOUNTER SYMPTOMS
DIARRHEA: 0
VOMITING: 0
CHEST TIGHTNESS: 0
VOMITING: 0
NAUSEA: 0
BLOOD IN STOOL: 0
CONSTIPATION: 0
SHORTNESS OF BREATH: 1
BACK PAIN: 0
SHORTNESS OF BREATH: 0
SINUS PAIN: 1
EYE REDNESS: 0
RHINORRHEA: 0
NAUSEA: 0
SINUS PRESSURE: 1
SORE THROAT: 0
RHINORRHEA: 1
ABDOMINAL PAIN: 0
COUGH: 0
SORE THROAT: 0
ABDOMINAL PAIN: 0
COUGH: 1
DIARRHEA: 0
EYE DISCHARGE: 0

## 2019-01-01 ASSESSMENT — PULMONARY FUNCTION TESTS: PIF_VALUE: 27

## 2019-01-01 ASSESSMENT — PATIENT HEALTH QUESTIONNAIRE - PHQ9
2. FEELING DOWN, DEPRESSED OR HOPELESS: 0
SUM OF ALL RESPONSES TO PHQ QUESTIONS 1-9: 0
1. LITTLE INTEREST OR PLEASURE IN DOING THINGS: 0
SUM OF ALL RESPONSES TO PHQ QUESTIONS 1-9: 0
SUM OF ALL RESPONSES TO PHQ9 QUESTIONS 1 & 2: 0

## 2019-01-01 ASSESSMENT — PAIN SCALES - GENERAL: PAINLEVEL_OUTOF10: 0

## 2019-04-14 PROBLEM — R06.02 SOB (SHORTNESS OF BREATH): Status: ACTIVE | Noted: 2019-01-01

## 2019-04-14 NOTE — FLOWSHEET NOTE
Corrina Colin is a 58 y.o. Female complaining of having difficulty breathing. Patient stated her daughter was in the waiting area. Patient accepted chaplains visit. Patient stated she had a defibrillator implanted in November. Patient was very calm and very talkative. Patient stated she always has a hard time when she has to give blood due to rolling veins. Patient was otherwise very calm and peaceful.  sat at bedside and provided a \"presence' and active, empathetic and compassionate listening.  engaged patient in conversation about loss, and health challenges. Patient would like to stop smoking but feels powerless to accomplish this. Patient accepted prayer from  and expressed gratitude to . Chaplains remain available for spiritual and emotional support as needed. 04/14/19 5018   Encounter Summary   Services provided to: Patient   Referral/Consult From: Amaury Boss Visiting   (4/14/19)   Complexity of Encounter Low   Length of Encounter 30 minutes   Spiritual Assessment Completed Yes   Routine   Type Initial   Assessment Calm; Approachable   Intervention Active listening;Prayer   Outcome Acceptance;Expressed gratitude

## 2019-04-14 NOTE — ED NOTES
Blood glucose preformed, pt tolerated well, pt eyes closed upon writer walking into the room. Pt nad at this time, non labored breathing.       Kevin Gross RN  04/14/19 9889

## 2019-04-14 NOTE — ED NOTES
Pt arrived to ed via ems, pt was able to scoot from the stretcher onto her bed. C/O: SOB, pt states the SOB has been going on for 4 weeks, pt states she had a defibrillator placed in November, pt states no cough at this time, no pain at this time, pt states no blurry vision, no dizziness, no lightheadedness. Pt states smoking marijuana earlier today, pt is an every day smoker. Pt NAD at this time, pt speaking in full sentences, pt placed on full monitor, pt 97% at room air. Writer will continue to monitor.        Mian Bentley RN  04/14/19 7532

## 2019-04-14 NOTE — ED NOTES
Bed: 17  Expected date:   Expected time:   Means of arrival:   Comments:  SINDY  Nabil Adamson RN  04/14/19 4994

## 2019-04-14 NOTE — ED PROVIDER NOTES
[facility]  Emergency Department Encounter  EmergencyMedicine Resident     Pt Name:Xochitl oFrte  MRN: [id number]  [place] 1955  Date of evaluation: 4/14/19  PCP:  Ailyn Valadez MD    [address] [place] [address]       Chief Complaint   Patient presents with    Shortness of Breath       HISTORY OF PRESENT ILLNESS  (Location/Symptom, Timing/Onset, Context/Setting, Quality, Duration, Modifying Factors, Severity.)      Bety Allison is a 58 y.o. female who presents with complaints of shortness of breath is been ongoing for the past several days. Patient received breathing treatment via EMS prior to arrival with significant improvement in her tachypnea/shortness of breath. Patient is speaking in complete sentences, with no hypoxemia or tachypnea on my initial evaluation. Significant past medical history for CAD, drug abuse, hyperlipidemia, and hypertension. Patient denies any headache or change in vision, no nausea or vomiting, no fevers or chills, no chest pain, +shortness of breath, no abdominal pain, and no  symptoms including no hematuria or blood in stool, as well as no dysuria. PAST MEDICAL / SURGICAL / SOCIAL / FAMILY HISTORY      has a past medical history of Arthritis, CAD (coronary artery disease), Carotid artery stenosis, Chronic back pain, Clotting disorder ([address] COPD (chronic obstructive pulmonary disease) ([address] Disease of blood and blood forming organ, Drug abuse ([address] GERD (gastroesophageal reflux disease), H/O blood clots, Head injury, Heart attack ([address] Hx of blood clots, Hyperlipidemia, Hypertension, Low back pain with sciatica, NSTEMI (non-ST elevated myocardial infarction) ([address] Prediabetes, and Stomach ulcer. has a past surgical history that includes Coronary angioplasty with stent (2/14/2006,04/2013); Hysterectomy (2005); Tonsillectomy (1960); Tubal ligation (1979); skin biopsy; Endoscopy, colon, diagnostic; and cyst removal (Right, 9/26/2013).     Social History Socioeconomic History    Marital status:      Spouse name: Not on file    Number of children: Not on file    Years of education: Not on file    Highest education level: Not on file   Occupational History     Employer: N/A   Social Needs    Financial resource strain: Not on file    Food insecurity:     Worry: Not on file     Inability: Not on file    Transportation needs:     Medical: Not on file     Non-medical: Not on file   Tobacco Use    Smoking status: Current Every Day Smoker     Packs/day: 1.00     Years: 37.00     Pack years: 37.00     Types: Cigarettes    Smokeless tobacco: Never Used   Substance and Sexual Activity    Alcohol use: Yes     Alcohol/week: 0.0 oz     Comment: every now and then    Drug use: Yes     Types: Marijuana     Comment: daily    Sexual activity: Never   Lifestyle    Physical activity:     Days per week: Not on file     Minutes per session: Not on file    Stress: Not on file   Relationships    Social connections:     Talks on phone: Not on file     Gets together: Not on file     Attends [employer] service: Not on file     Active member of club or organization: Not on file     Attends meetings of clubs or organizations: Not on file     Relationship status: Not on file    Intimate partner violence:     Fear of current or ex partner: Not on file     Emotionally abused: Not on file     Physically abused: Not on file     Forced sexual activity: Not on file   Other Topics Concern    Not on file   Social History Narrative    ** Merged History Encounter **            Family History   Problem Relation Age of Onset    Heart Disease Mother     Diabetes Mother     High Blood Pressure Mother     High Cholesterol Mother     Other Mother         graves disease    Parkinsonism Mother     Heart Disease Father     High Cholesterol Father     High Blood Pressure Father        Allergies:  Latex; Coreg [carvedilol]; Lasix [furosemide]; Toprol xl [metoprolol];  Codeine; and Tape Daquan Vasquez tape]    Home Medications:  Prior to Admission medications    Medication Sig Start Date End Date Taking? Authorizing Provider   clopidogrel (PLAVIX) 75 MG tablet TAKE ONE TABLET BY MOUTH DAILY 12/10/18  Yes Yarely Mena MD   lisinopril (PRINIVIL;ZESTRIL) 2.5 MG tablet TAKE ONE TABLET BY MOUTH DAILY 12/10/18  Yes Yarely Mena MD   cyclobenzaprine (FLEXERIL) 10 MG tablet Take 1 tablet by mouth 3 times daily as needed for Muscle spasms 12/10/18  Yes Yarely Mena MD   albuterol sulfate HFA (PROVENTIL HFA) 108 (90 Base) MCG/ACT inhaler Inhale 2 puffs into the lungs every 6 hours as needed for Wheezing 4/24/18  Yes Carla Major MD   aspirin 81 MG tablet Take 81 mg by mouth daily   Yes Historical Provider, MD   NITROSTAT 0.4 MG SL tablet DISSOLVE 1 TAB UNDER TONGUE FOR CHEST PAIN - IF PAIN REMAINS AFTER 5 MIN, CALL 911 AND REPEAT DOSE. MAX 3 TABS IN 15 MINUTES 7/23/15  Yes Shukri Payne MD   ondansetron (ZOFRAN) 4 MG tablet Take 1 tablet by mouth every 8 hours as needed for Nausea or Vomiting 4/8/18   Carla Major MD   Blood Pressure Monitoring (SURELIFE BP MONITOR/ARM) SANDY Dx: Essential Hypertension. Monitor BP daily and log 10/2/17   Carly Williamson MD   atorvastatin (LIPITOR) 80 MG tablet Take 1 tablet by mouth nightly 9/21/17   Shruti Duron MD       REVIEW OF SYSTEMS    (2-9 systems for level 4, 10 or more for level 5)      Review of Systems   Constitutional: Negative for chills and fever. HENT: Negative for congestion, rhinorrhea and sore throat. Eyes: Negative for discharge and redness. Respiratory: Positive for cough and shortness of breath. Negative for chest tightness. Cardiovascular: Negative for chest pain. Gastrointestinal: Negative for abdominal pain, blood in stool, diarrhea, nausea and vomiting. Endocrine: Negative for polydipsia and polyuria. Genitourinary: Negative for dysuria and hematuria. Musculoskeletal: Negative for neck pain and neck stiffness. 2 seconds. No rash noted. Psychiatric: She has a normal mood and affect.        DIFFERENTIAL  DIAGNOSIS     PLAN (LABS / IMAGING / EKG):  Orders Placed This Encounter   Procedures    XR CHEST STANDARD (2 VW)    Basic Metabolic Panel    Brain Natriuretic Peptide    Calcium, Ionized    CBC Auto Differential    Troponin    Immature Platelet Fraction    Diet NPO Effective Now    Vital signs per unit routine    Notify physician    Notify physician    Up with assistance    Place intermittent pneumatic compression device    Telemetry Monitoring    Full Code    Inpatient consult to Cardiology    Respiratory Care Evaluation and Treat    Initiate RT Protocol    Respiratory care evaluation only    MDI Treatment    EKG 12 Lead    PATIENT STATUS (FROM ED OR OR/PROCEDURAL) Observation       MEDICATIONS ORDERED:  Orders Placed This Encounter   Medications    clopidogrel (PLAVIX) tablet 75 mg    lisinopril (PRINIVIL;ZESTRIL) tablet 2.5 mg    sodium chloride flush 0.9 % injection 10 mL    sodium chloride flush 0.9 % injection 10 mL    acetaminophen (TYLENOL) tablet 650 mg    enoxaparin (LOVENOX) injection 40 mg    ondansetron (ZOFRAN) injection 4 mg    DISCONTD: albuterol (PROVENTIL) nebulizer solution 2.5 mg    albuterol sulfate  (90 Base) MCG/ACT inhaler 2 puff    cetirizine (ZYRTEC) tablet 10 mg    fluticasone (FLONASE) 50 MCG/ACT nasal spray 1 spray       DDX: COPD exacerbation, CAD/MI, viral URI, pneumonia    DIAGNOSTIC RESULTS / EMERGENCY DEPARTMENT COURSE / MDM     LABS:  Results for orders placed or performed during the hospital encounter of 81/20/65   Basic Metabolic Panel   Result Value Ref Range    Glucose 101 (H) 70 - 99 mg/dL    BUN 18 8 - 23 mg/dL    CREATININE 0.68 0.50 - 0.90 mg/dL    Bun/Cre Ratio NOT REPORTED 9 - 20    Calcium 9.5 8.6 - 10.4 mg/dL    Sodium 138 135 - 144 mmol/L    Potassium 5.0 3.7 - 5.3 mmol/L    Chloride 102 98 - 107 mmol/L    CO2 20 20 - 31 mmol/L Physician who either signs or Co-signs thischart in the absence of a cardiologist.      PROCEDURES:  None    CONSULTS:  IP CONSULT TO CARDIOLOGY    CRITICAL CARE:  None    FINAL IMPRESSION      1. COPD exacerbation ([address]          DISPOSITION / PLAN     DISPOSITION        PATIENT REFERRED TO:  Ruthie Whittaker MD  [address] [facility] [address]  Virgil Mortensen MD  [phone]            DISCHARGE MEDICATIONS:  Current Discharge Medication List          Abram Willoughby DO  Emergency Medicine Resident    (Please note that portions of this note were completed with a voice recognition program.  Ishan Marie made to edit the dictations but occasionally words are mis-transcribed.)     Abram Willoughby DO  04/14/19 5670

## 2019-04-15 NOTE — H&P
[facility]  CDU / OBSERVATION eNCOUnter  Resident Note     Pt Name: Alvaro Crain  MRN: [id number]  [place] 1955  Date of evaluation: 4/15/19  Patient's PCP is :  Yung Sol MD    CHIEF COMPLAINT       Chief Complaint   Patient presents with    Shortness of Breath         HISTORY OF PRESENT ILLNESS    Alvaro Crain is a 58 y.o. female who presents to the emergency department with complaints of shortness of breath that has been going on for the last few days. Prior to arrival patient received breathing treatments with EMS likely albuterol. These treatments improved the patient's clinical picture. In the emergency department patient was speaking in complete sentences, with no hypoxemia or tachypnea. Patient has a significant past medical history for coronary artery disease, drug abuse, hyperlipidemia, hypertension. Cardiology was consultative emergency department secondary to elevated troponin of 18. Cardiology recommended evaluation in the morning. Location/Symptom: shortness of breath   Timing/Onset: sudden   Provocation: none  Quality:   Radiation:   Severity:   Timing/Duration:   Modifying Factors: nebulized aerosols     REVIEW OF SYSTEMS       General ROS - No fevers, No malaise   Ophthalmic ROS - No discharge, No changes in vision  ENT ROS -  No sore throat, No rhinorrhea,   Respiratory ROS - +  shortness of breath, no cough, no  wheezing  Cardiovascular ROS - No chest pain, no dyspnea on exertion  Gastrointestinal ROS - No abdominal pain, no nausea or vomiting, no change in bowel habits, no black or bloody stools  Genito-Urinary ROS - No dysuria, trouble voiding, or hematuria  Musculoskeletal ROS - No myalgias, No arthalgias  Neurological ROS - No headache, no dizziness/lightheadedness, No focal weakness, no loss of sensation  Dermatological ROS - No lesions, No rash     (PQRS) Advance directives on face sheet per hospital policy.  No change unless specifically mentioned in chart    [address]    has a past medical history of Arthritis, CAD (coronary artery disease), Carotid artery stenosis, Chronic back pain, Clotting disorder ([facility] [address] COPD (chronic obstructive pulmonary disease) ([facility] [address] Disease of blood and blood forming organ, Drug abuse ([facility] [address] GERD (gastroesophageal reflux disease), H/O blood clots, Head injury, Heart attack ([facility] [address] Hx of blood clots, Hyperlipidemia, Hypertension, Low back pain with sciatica, NSTEMI (non-ST elevated myocardial infarction) ([facility] [address] Prediabetes, and Stomach ulcer. I have reviewed the past medical history with the patient and it is pertinent to this complaint. SURGICAL HISTORY      has a past surgical history that includes Coronary angioplasty with stent (2006,2013); Hysterectomy (); Tonsillectomy (); Tubal ligation (); skin biopsy; Endoscopy, colon, diagnostic; and cyst removal (Right, 2013). I have reviewed and agree with Surgical History entered and it is pertinent to this complaint. CURRENT MEDICATIONS       melatonin tablet 3 mg Nightly PRN   clopidogrel (PLAVIX) tablet 75 mg Daily   lisinopril (PRINIVIL;ZESTRIL) tablet 2.5 mg Daily   sodium chloride flush 0.9 % injection 10 mL 2 times per day   sodium chloride flush 0.9 % injection 10 mL PRN   acetaminophen (TYLENOL) tablet 650 mg Q4H PRN   enoxaparin (LOVENOX) injection 40 mg Daily   ondansetron (ZOFRAN) injection 4 mg Q8H PRN   albuterol sulfate  (90 Base) MCG/ACT inhaler 2 puff Q4H PRN   cetirizine (ZYRTEC) tablet 10 mg Daily   fluticasone (FLONASE) 50 MCG/ACT nasal spray 1 spray Daily       All medication charted and reviewed. ALLERGIES     is allergic to latex; coreg [carvedilol]; lasix [furosemide]; toprol xl [metoprolol]; codeine; and tape [adhesive tape]. FAMILY HISTORY     indicated that her mother is . She indicated that her father is .      family history includes Diabetes in her mother; Heart Disease in version AVL   Q Waves: nonspecific    Clinical Impression: non-specific EKG    Niya Franco DO      RADIOLOGY:   I directly visualized the following  images and reviewed the radiologist interpretations:    Xr Chest Standard (2 Vw)    Result Date: 4/14/2019  EXAMINATION: TWO VIEWS OF THE CHEST 4/14/2019 5:55 pm COMPARISON: 11/02/2018 HISTORY: ORDERING SYSTEM PROVIDED HISTORY: SOB TECHNOLOGIST PROVIDED HISTORY: SOB Ordering Physician Provided Reason for Exam: Shortness of breath, rule out pulmonary edema Acuity: Unknown Type of Exam: Unknown FINDINGS: The lungs are clear. No pneumothorax or pleural effusion is seen. Cardiac and mediastinal contours are stable. AICD appears unchanged. No acute osseous abnormality is identified. Calcification in the right upper quadrant the abdomen is unchanged. No acute cardiopulmonary abnormality. LABS:  I have reviewed and interpreted all available lab results.   Labs Reviewed   BASIC METABOLIC PANEL - Abnormal; Notable for the following components:       Result Value    Glucose 101 (*)     All other components within normal limits   BRAIN NATRIURETIC PEPTIDE - Abnormal; Notable for the following components:    Pro-BNP 2,482 (*)     All other components within normal limits   CBC WITH AUTO DIFFERENTIAL - Abnormal; Notable for the following components:    RBC 5.18 (*)     Hemoglobin 15.5 (*)     All other components within normal limits   TROPONIN - Abnormal; Notable for the following components:    Troponin, High Sensitivity 18 (*)     All other components within normal limits   TROPONIN - Abnormal; Notable for the following components:    Troponin, High Sensitivity 18 (*)     All other components within normal limits   CALCIUM, IONIZED   IMMATURE PLATELET FRACTION       SCREENING TOOLS:    HEART Risk Score for Chest Pain Patients   History and Physical Exam Suspicion Level  (Nausea, Vomiting, Diaphoresis, Radiation, Exertion)   Slightly Suspicious (0 pts)   Moderately Suspicious (1 pt)   Highly Suspicious (2 pts)   EKG Interpretation   Normal (0 pts)   Non-Specific Repolarization Disturbance (1 pt)   Significant ST-Depression (2 pts)   Age of Patient (in years)   = 39 (0 pts)   46-64 (1 pt)   = 65 (2 pts)   Risk Factors   No Risk Factors (0 pts)   1-2 Risk Factors (1 pt)   = 3 Risk Factors (2 pts)   Risk Factors Include:   Hypercholesterolemia   Hypertension   Diabetes Mellitus   Cigarette smoking   Positive family history   Obesity   CAD   (SLE, CKDz, HIV, Cocaine abuse)   Troponin Levels   = Normal Limit (0 pts)   1-3 Times Normal Limit (1 pt)   > 3 Times Normal Limit (2 pts)  TOTAL:4    Percent Risk for Major Adverse Cardiac Event (MACE)  0-3 pts indicates low risk for MACE   2.5% (DISCHARGE)   4-7 pts indicates moderate risk for MACE  20.3% (OBS)  8-10 pts indicates high risk for MACE  72.7% (EARLY INVASIVE TX)    CDU IMPRESSION / Vick Thomason is a 58 y.o. female who presents with    1. Acute onset shortness of breath, etiology unknown, treated with nebulized aerosols. 1.  Cardiac workup in emergency department showed right bundle-branch block consistent with prior studies, high sensitivity troponin of 18, chest x-ray within normal limits. 2.  Cardiology consult while in the ED recommended evaluation in the morning   - ordered cardiac stress test     -   1. Large infarct of the apical wall extending into the anterior, lateral, and   inferior walls. 2. No definitive scintigraphic evidence for reversible ischemia. 3. Left ventricular ejection fraction of 25%. 4. Enlargement of the left ventricular cavity.  Global hypokinesis.  Findings   can be seen with dilated cardiomyopathy. 5.  Please see report for EKG portion of the examination which will be   performed separately by physician from cardiology. Risk stratification:  High risk       3.   Acute  BNP elevated to 2482, appears baseline is between 3109-5425, likely CHF treated with IV bumex and cardiology consult. 4. Cardiology clears patient for discharge secondary to this abnormal stress result being infarction and not reversible ischemia     · Continue home medications  · Monitor vitals, labs, and imaging  · DISPO: pending consults and clinical improvement    CONSULTS:    IP CONSULT TO CARDIOLOGY    PROCEDURES:  Not indicated       PATIENT REFERRED TO:    Yung Sol MD  [address] [facility]  [place] [address], [address] [facility][phone]            --  Yovana Mg DO   Emergency Medicine Resident     This dictation was generated by voice recognition computer software. Although all attempts are made to edit the dictation for accuracy, there may be errors in the transcription that are not intended.

## 2019-04-15 NOTE — CARE COORDINATION
Discharge transportation coordinated through [address] [place] [address].  Confirmation#[id number]

## 2019-04-15 NOTE — CARE COORDINATION
Patient stated that she does not want a  and does not need assistance. She has no needs and can coordinate her own transportation. Initial Discharge Summary unable to complete.

## 2019-04-15 NOTE — PROGRESS NOTES
Cardiac Testing     ICD 11/1/18: Medtronic device placed by Dr. Jaye Nicholson for ICM.     ECHO 10/29/18: EF 30-35%, advanced DD, mild AI/MR.      HOLTER 3/14/18: SR with ST/SB. Rare PACs with pairs. Rare PVCs with couplets. CTS 11/20/17: Pt known to us, she is a candidate for surgery but wants to have second opinion. Will see her again if she is agreeable to surgery. Janet Saul MD     CTS 9/20/17: Pt seen and examined, she is a candidate for CABG and LVA resection. Pt wants to go home and come back for surgery, discussed with Dr. Kadie Wallace, will get PFTs, carotid dopplers and if she has no CP or dyspnea on walking around in the unit, she could go home and come back for early surgery. Janet Saul MD     CATH 9/20/17: MVD. EF 30%. Aneurysm apical area. STRESS 9/18/17: Findings concerning for infarct of the apex, lateral and anterior walls. No reversible defect. EF 27%.      1. History of coronary artery disease with PCI of the LAD and left circ. before with admission to the hospital on October 17, 2014, with inferior ST elevation myocardial infarction. She did have coronary angiography done which showed the left main to be normal, LAD had patent stents, left circ. had patent stents, RCA had proximal stent with 40% instent restenosis, the mid segment had 100% occlusion, which was reduced to 0% using 2.75 x 18 mm drug-eluting stent. Her left ventricular ejection fraction was 50%. She has been maintained on aspirin, Lipitor, lisinopril, metoprolol and Plavix since then. Her last lipid profile was done on October 18, 2014, and her LDL was 126, her HDL was 31 and her triglyceride was 84. 2.History of hypertension. 3.History of hyperlipidemia. 4.History of chronic smoking. I did  her regarding stopping smoking. 5.Stress test 11/19/2015: Stable appearing large anterior wall fixed defect compatible with patients known infarction and essentially unchanged as compared to prior study. EF 58%.

## 2019-04-15 NOTE — CONSULTS
[facility]    Consult / H&P               Today's Date: 4/15/2019  Patient Name: Eran Stuart  Date of admission: 4/14/2019  5:30 PM  Patient's age: 58 y.o., 1955  Admission Dx: SOB (shortness of breath) [R06.02]    Reason for Consult:  Cardiac evaluation    Requesting Physician: Leighton Ledesma MD    CHIEF COMPLAINT:  SOB    History Obtained From:  patient, electronic medical record    HISTORY OF PRESENT ILLNESS:      The patient is a 58 y.o.  female who is admitted to the hospital for shortness of breath. She describes shortness of breath for one month and yesterday it got worse. She states that it can happen with laying down or activity. Describes the pain as tightness and pressure, inhaler helps the symptoms and nitor has helped the pain a little. She has associated fatigue. Denies CP, Cough, headache, vision changes, abdominal pain, or trauma. She has a AICD and has not experienced any shocks. Has had difficulty reduce salt in her diet. Past Medical History:   has a past medical history of Arthritis, CAD (coronary artery disease), Carotid artery stenosis, Chronic back pain, Clotting disorder ([address] COPD (chronic obstructive pulmonary disease) ([address] Disease of blood and blood forming organ, Drug abuse ([address] GERD (gastroesophageal reflux disease), H/O blood clots, Head injury, Heart attack ([address] Hx of blood clots, Hyperlipidemia, Hypertension, Low back pain with sciatica, NSTEMI (non-ST elevated myocardial infarction) ([address] Prediabetes, and Stomach ulcer. Past Surgical History:   has a past surgical history that includes Coronary angioplasty with stent (2/14/2006,04/2013); Hysterectomy (2005); Tonsillectomy (1960); Tubal ligation (1979); skin biopsy; Endoscopy, colon, diagnostic; and cyst removal (Right, 9/26/2013). Home Medications:    Prior to Admission medications    Medication Sig Start Date End Date Taking?  Authorizing Provider   clopidogrel sulfate  (90 Base) MCG/ACT inhaler 2 puff, 2 puff, Inhalation, Q4H PRN  cetirizine (ZYRTEC) tablet 10 mg, 10 mg, Oral, Daily  fluticasone (FLONASE) 50 MCG/ACT nasal spray 1 spray, 1 spray, Each Nare, Daily    Allergies:  Latex; Coreg [carvedilol]; Lasix [furosemide]; Toprol xl [metoprolol]; Codeine; and Tape [adhesive tape]    Social History:   reports that she has been smoking cigarettes. She has a 37.00 pack-year smoking history. She has never used smokeless tobacco. She reports that she drinks alcohol. She reports that she has current or past drug history. Drug: Marijuana. Family History: family history includes Diabetes in her mother; Heart Disease in her father and mother; High Blood Pressure in her father and mother; High Cholesterol in her father and mother; Other in her mother; Parkinsonism in her mother. REVIEW OF SYSTEMS:    · Constitutional: there has been no unanticipated weight loss. There's been No change in energy level, No change in activity level. · Eyes: No visual changes or diplopia. No scleral icterus. · ENT: No Headaches  · Cardiovascular: Ischemic cardiomyopathy, CAD with history of stents (LAD, LCX, and RCA)  · Respiratory: No cough, difficult breathing with needing to use inhaler  · Gastrointestinal: No abdominal pain. No change in bowel or bladder habits. · Genitourinary: No dysuria, trouble voiding, or hematuria. · Musculoskeletal:  No gait disturbance, No weakness or joint complaints. · Integumentary: No rash or pruritis. · Neurological: No headache, diplopia, change in muscle strength, numbness or tingling. No change in gait, balance, coordination, mood, affect, memory, mentation, behavior. · Psychiatric: No anxiety, or depression. · Endocrine: No temperature intolerance. No excessive thirst, fluid intake, or urination. No tremor. · Hematologic/Lymphatic: No abnormal bruising or bleeding, blood clots or swollen lymph nodes.   · Allergic/Immunologic: No nasal congestion or hives. PHYSICAL EXAM:      /86 Comment: manual BP  Pulse 72   Temp 97 °F (36.1 °C) (Oral)   Resp 18   Ht 5' 1\" (1.549 m)   Wt 165 lb (74.8 kg)   SpO2 97%   BMI 31.18 kg/m²    Constitutional and General Appearance: alert, cooperative, no distress and appears stated age  HEENT: PERRL, no cervical lymphadenopathy. No masses palpable. Normal oral mucosa  Respiratory:  · Normal excursion and expansion without use of accessory muscles  · Resp Auscultation: Diminished breath sounds bilaterally, more on left. No for increased work of breathing. On auscultation: clear to auscultation bilaterally  Cardiovascular:  · The apical impulse is not displaced  · Heart tones are crisp and normal. regular S1 and S2.  · Jugular venous pulsation Normal  · The carotid upstroke is normal in amplitude and contour without delay or bruit  · Peripheral pulses are symmetrical and full   Abdomen:   · No masses or tenderness  · Bowel sounds present  Extremities:  ·  No Cyanosis or Clubbing  ·  Lower extremity edema: No  ·  Skin: Warm and dry  Neurological:  · Alert and oriented. · Moves all extremities well  · No abnormalities of mood, affect, memory, mentation, or behavior are noted    DATA:    Diagnostics:    CXR: 19  The lungs are clear.  No pneumothorax or pleural effusion is seen.  Cardiac   and mediastinal contours are stable.  AICD appears unchanged.  No acute   osseous abnormality is identified.  Calcification in the right upper quadrant   the abdomen is unchanged. EK19  Normal sinus rhythm  Right bundle branch block  Minimal voltage criteria for LVH, may be normal variant  Septal infarct (cited on or before 24-AUG-2016)  Abnormal ECG  When compared with ECG of 2019 17:41,  No significant change was found    ICD 18: Medtronic device placed by Dr. Elian Perez for ICM.     ECHO 10/29/18: EF 30-35%, advanced DD, mild AI/MR.      HOLTER 3/14/18: SR with ST/SB.  Rare PACs with pairs. Rare PVCs with couplets.      CTS 11/20/17: Pt known to us, she is a candidate for surgery but wants to have second opinion. Will see her again if she is agreeable to surgery. Arlet Gonsalez MD     CTS 9/20/17: Pt seen and examined, she is a candidate for CABG and LVA resection. Pt wants to go home and come back for surgery, discussed with Dr. Lillard Schilder, will get PFTs, carotid dopplers and if she has no CP or dyspnea on walking around in the unit, she could go home and come back for early surgery. Arlet Gonsalez MD     CATH 9/20/17: MVD. EF 30%. Aneurysm apical area.      STRESS 9/18/17: Findings concerning for infarct of the apex, lateral and anterior walls. No reversible defect. EF 27%. Labs:     CBC:   Recent Labs     04/14/19  1751   WBC 7.2   HGB 15.5*   HCT 47.1   PLT See Reflexed IPF Result     BMP:   Recent Labs     04/14/19  1751      K 5.0   CO2 20   BUN 18   CREATININE 0.68   LABGLOM >60   GLUCOSE 101*     BNP: No results for input(s): BNP in the last 72 hours. PT/INR: No results for input(s): PROTIME, INR in the last 72 hours. APTT:No results for input(s): APTT in the last 72 hours. CARDIAC ENZYMES:No results for input(s): CKTOTAL, CKMB, CKMBINDEX, TROPONINI in the last 72 hours. FASTING LIPID PANEL:  Lab Results   Component Value Date    HDL 44 04/12/2018    TRIG 104 04/12/2018     LIVER PROFILE:No results for input(s): AST, ALT, LABALBU in the last 72 hours. IMPRESSION:    -SOB  -CHF, diastolic disfunction  -COPD exacerbation    RECOMMENDATIONS:  1. Chemical Stress test  2. D/C lisinopril  3. Add Entresto   4. Add IV Lasix     Will discuss with rounding attending  for final recommendations. Lani Lucio, [address] [facility]  Medical Student  . Attending Physician Statement  I have discussed the care of the patient, including pertinent history and exam findings, with the resident.  I have seen and examined the patient and the key elements of all parts of the encounter have been performed by me. I agree with the assessment, plan and orders as documented by the resident.   Plan for Lexiscan stress test ,  HFrF , will start entresto , after stopping ACE-I for 2 days   Emilie Mandel MD

## 2019-04-15 NOTE — PROCEDURES
[address] [facility] [address]                              CARDIAC STRESS TEST    PATIENT NAME: Eliceo Mora                    :        1955  MED REC NO:   [id number]                             ROOM:       [id number]  ACCOUNT NO:   [de-identified]                           ADMIT DATE: 2019  PROVIDER:     Mary Duke    DATE OF STUDY:  04/15/2019    ORDERING PROVIDER:  Christine Traore MD  PRIMARY CARE PROVIDER:  Sai Mirza MD  INTERPRETING PHYSICIAN:  Abhinav Trevino MD    LEXISCAN STRESS STUDY:  Indication:  chest pain  Procedure explained and consent signed    Medications:  Lexiscan, 0.4 mg  Resting heart rate:  82  Resting blood pressure:  140/100  Infusion heart rate:  137  Infusion blood pressure:  140/100  Resting EKG:  Normal  Stress heart response:  Normal response  Stress BP response:  Appropriate  Chest discomfort:  Chest pain (8/10) during study  Ischemic EKG changes:  None    IMPRESSION:  Electrocardiographically negative Lexiscan stress study. Radio-isotope  results to follow from the department of Nuclear Medicine.         Selma Jimenez    D: 04/15/2019 15:30:30       T: 04/15/2019 15:31:18     AK/CHUY

## 2019-04-15 NOTE — PROGRESS NOTES
Larisa Knight, [facility] Center[facility]ient Assessment complete. SOB (shortness of breath) [R06.02] . Vitals:    04/14/19 2011   BP: (!) 146/110   Pulse: 86   Resp: 18   Temp: 97.6 °F (36.4 °C)   SpO2: 95%   . Patients home meds are   Prior to Admission medications    Medication Sig Start Date End Date Taking? Authorizing Provider   clopidogrel (PLAVIX) 75 MG tablet TAKE ONE TABLET BY MOUTH DAILY 12/10/18  Yes Maree Severs, MD   lisinopril (PRINIVIL;ZESTRIL) 2.5 MG tablet TAKE ONE TABLET BY MOUTH DAILY 12/10/18  Yes Maree Severs, MD   cyclobenzaprine (FLEXERIL) 10 MG tablet Take 1 tablet by mouth 3 times daily as needed for Muscle spasms 12/10/18  Yes Maree Severs, MD   albuterol sulfate HFA (PROVENTIL HFA) 108 (90 Base) MCG/ACT inhaler Inhale 2 puffs into the lungs every 6 hours as needed for Wheezing 4/24/18  Yes Donato Frances MD   aspirin 81 MG tablet Take 81 mg by mouth daily   Yes Historical Provider, MD   NITROSTAT 0.4 MG SL tablet DISSOLVE 1 TAB UNDER TONGUE FOR CHEST PAIN - IF PAIN REMAINS AFTER 5 MIN, CALL 911 AND REPEAT DOSE. MAX 3 TABS IN 15 MINUTES 7/23/15  Yes Brendon Torres MD   ondansetron (ZOFRAN) 4 MG tablet Take 1 tablet by mouth every 8 hours as needed for Nausea or Vomiting 4/8/18   Donato Frances MD   Blood Pressure Monitoring (SURELIFE BP MONITOR/ARM) SANDY Dx: Essential Hypertension.   Monitor BP daily and log 10/2/17   Morgan Reynolds MD   atorvastatin (LIPITOR) 80 MG tablet Take 1 tablet by mouth nightly 9/21/17   Karen Quiñonez MD   .  Recent Surgical History: None = 0     Assessment current smoker, denies home meds other than PRN albuterol MDI      RR 18   Breath Sounds: clear/dim      · Bronchodilator assessment at level  1  · Hyperinflation assessment at level   · Secretion Management assessment at level    ·   · []    Bronchodilator Assessment  BRONCHODILATOR ASSESSMENT SCORE  Score 0 1 2 3 4 5   Breath Sounds   []  Patient Baseline [x]  No Wheeze good aeration []  Faint, scattered wheezing, good aeration []  Expiratory Wheezing and or moderately diminished []  Insp/Exp wheeze and/or very diminished []  Insp/Exp and/ or marked distress   Respiratory Rate   []  Patient Baseline [x]  Less than 20 []  Less than 20 []  20-25 []  Greater than 25 []  Greater than 25   Peak flow % of Pred or PB []  NA   []  Greater than 90%  []  81-90% []  71-80% []  Less than or equal to 70%  or unable to perform []  Unable due to Respiratory Distress   Dyspnea re []  Patient Baseline []  No SOB []  No SOB []  SOB on exertion []  SOB min activity []  At rest/acute   e FEV% Predicted       []  NA []  Above 69%  []  Unable []  Above 60-69%  []  Unable []  Above 50-59%  []  Unable []  Above 35-49%  []  Unable []  Less than 35%  []  Unable

## 2019-04-16 NOTE — TELEPHONE ENCOUNTER
[facility] Trans[facility] Initial Follow Up Call    Call within 2 business days of discharge: Yes     Patient: Alvaro Crain Patient : 1955 MRN: [id number]    [unfilled]    RARS: Readmission Risk Score: 0       Spoke with: Pt she states she is doing slightly better still SOB but states not any worse. Pt has not picked up her new medication but states her daughter is going to. Stress the importance of talking her medications as ordered. Pt declines a transitional/hospital follow up appointment. Does not drive. States she will have to check with her daughter office phone number given to pt she states she will call us to schedule. Advised to bring her medication bottles to the appointment. Discharge department/facility:  [address] [place] [address] services provided:   declined appt at present time. Follow Up  No future appointments.     Reid Lay

## 2019-04-17 NOTE — DISCHARGE SUMMARY
CDU Discharge Summary        Patient:  Sis Rodriguez  YOB: 1955    MRN: [id number]   Acct: [de-identified]    Primary Care Physician: Mary Grace Bueno MD    Admit date:  4/14/2019  5:30 PM  Discharge date: 4/15/2019  4:47 PM     Discharge Diagnoses:     1. Acute onset shortness of breath, etiology unknown, treated with nebulized aerosols. 2. Acute onset elevated BNP to 2482, likely CHF, treated with IV Bumex and cardiology follow up.     1.  Cardiac workup in emergency department showed right bundle-branch block consistent with prior studies, high sensitivity troponin of 18, chest x-ray within normal limits. 2.  Cardiology consult while in the ED recommended evaluation in the morning              - ordered cardiac stress test                           -   1. Large infarct of the apical wall extending into the anterior, lateral, and   inferior walls. 2. No definitive scintigraphic evidence for reversible ischemia. 3. Left ventricular ejection fraction of 25%. 4. Enlargement of the left ventricular cavity.  Global hypokinesis.  Findings   can be seen with dilated cardiomyopathy. 5.  Please see report for EKG portion of the examination which will be   performed separately by physician from cardiology. Risk stratification:  High risk         3. BNP elevated to 2482, appears baseline is between 0563-1002.  4. Cardiology clears patient for discharge secondary to this abnormal stress result being infarction and not reversible ischemia           Follow-up:  Call today/tomorrow for a follow up appointment with Mary Grace Bueno MD , or return to the Emergency Room with worsening symptoms    Stressed to patient the importance of following up with primary care doctor for further workup/management of symptoms. Pt verbalizes understanding and agrees with plan.     Discharge Medication Changes:       Medication List      START taking these medications    sacubitril-valsartan 24-26 MG per tablet  Commonly mmol/L    Chloride 102 98 - 107 mmol/L    CO2 20 20 - 31 mmol/L    Anion Gap 16 9 - 17 mmol/L    GFR Non-African American >60 >60 mL/min    GFR African American >60 >60 mL/min    GFR Comment          GFR Staging NOT REPORTED    Brain Natriuretic Peptide   Result Value Ref Range    Pro-BNP 2,482 (H) <300 pg/mL    BNP Interpretation Pro-BNP Reference Range:    Calcium, Ionized   Result Value Ref Range    Calcium, Ion 1.21 1.13 - 1.33 mmol/L   CBC Auto Differential   Result Value Ref Range    WBC 7.2 3.5 - 11.3 k/uL    RBC 5.18 (H) 3.95 - 5.11 m/uL    Hemoglobin 15.5 (H) 11.9 - 15.1 g/dL    Hematocrit 47.1 36.3 - 47.1 %    MCV 90.9 82.6 - 102.9 fL    MCH 29.9 25.2 - 33.5 pg    MCHC 32.9 28.4 - 34.8 g/dL    RDW 13.8 11.8 - 14.4 %    Platelets See Reflexed IPF Result 138 - 453 k/uL    MPV NOT REPORTED 8.1 - 13.5 fL    NRBC Automated 0.0 0.0 per 100 WBC    Differential Type NOT REPORTED     WBC Morphology NOT REPORTED     RBC Morphology NOT REPORTED     Platelet Estimate NOT REPORTED     Seg Neutrophils 51 36 - 65 %    Lymphocytes 39 24 - 43 %    Monocytes 7 3 - 12 %    Eosinophils % 3 1 - 4 %    Basophils 0 0 - 2 %    Immature Granulocytes 0 0 %    Segs Absolute 3.66 1.50 - 8.10 k/uL    Absolute Lymph # 2.80 1.10 - 3.70 k/uL    Absolute Mono # 0.50 0.10 - 1.20 k/uL    Absolute Eos # 0.23 0.00 - 0.44 k/uL    Basophils # 0.03 0.00 - 0.20 k/uL    Absolute Immature Granulocyte <0.03 0.00 - 0.30 k/uL   Troponin   Result Value Ref Range    Troponin, High Sensitivity 18 (H) 0 - 14 ng/L    Troponin T NOT REPORTED <0.03 ng/mL    Troponin Interp NOT REPORTED    Troponin   Result Value Ref Range    Troponin, High Sensitivity 18 (H) 0 - 14 ng/L    Troponin T NOT REPORTED <0.03 ng/mL    Troponin Interp NOT REPORTED    Immature Platelet Fraction   Result Value Ref Range    Platelet, Immature Fraction  1.1 - 10.3 %    Platelet, Fluorescence Platelet clumps present, count appears adequate.  138 - 453 k/uL   EKG 12 Lead   Result Value Ref Range    Ventricular Rate 75 BPM    Atrial Rate 75 BPM    P-R Interval 146 ms    QRS Duration 120 ms    Q-T Interval 434 ms    QTc Calculation (Bazett) 484 ms    P Axis 48 degrees    R Axis -24 degrees    T Axis 59 degrees   EKG 12 Lead   Result Value Ref Range    Ventricular Rate 75 BPM    Atrial Rate 75 BPM    P-R Interval 142 ms    QRS Duration 134 ms    Q-T Interval 462 ms    QTc Calculation (Bazett) 515 ms    P Axis 39 degrees    R Axis -23 degrees    T Axis 68 degrees     Xr Chest Standard (2 Vw)    Result Date: 4/14/2019  EXAMINATION: TWO VIEWS OF THE CHEST 4/14/2019 5:55 pm COMPARISON: 11/02/2018 HISTORY: ORDERING SYSTEM PROVIDED HISTORY: SOB TECHNOLOGIST PROVIDED HISTORY: SOB Ordering Physician Provided Reason for Exam: Shortness of breath, rule out pulmonary edema Acuity: Unknown Type of Exam: Unknown FINDINGS: The lungs are clear. No pneumothorax or pleural effusion is seen. Cardiac and mediastinal contours are stable. AICD appears unchanged. No acute osseous abnormality is identified. Calcification in the right upper quadrant the abdomen is unchanged. No acute cardiopulmonary abnormality. Nm Myocardial Spect Rest Exercise Or Rx    Result Date: 4/15/2019  EXAMINATION: MYOCARDIAL PERFUSION IMAGING 4/15/2019 10:53 am TECHNIQUE: Rest dose:  15.5 mCi Tc-99m sestamibi intravenously Stress dose:  40 mCi Tc-99m sestamibi intravenously Under cardiology supervision, 0.4 mg Lexiscan was infused intravenously prior to injection of the stress dose. SPECT imaging was acquired following injection of the sestamibi. ECG gating was obtained following the stress acquisition.  COMPARISON: 09/18/2017 HISTORY: ORDERING SYSTEM PROVIDED HISTORY: TECHNOLOGIST PROVIDED HISTORY: Ordering Physician Provided Reason for Exam: SOB, palpitations, sweating Additional signs and symptoms: heart attack, high blood pressure, COPD 42-year-old female with shortness of breath, palpitations and sweating FINDINGS: The patient achieved a maximum heart rate of 137 beats per minute, 87 % of the maximum age predicted heart rate of 156 beats per minute. Perfusion: Large fixed area of decreased perfusion involving the apex extending into the anterior wall as well as the inferior and lateral walls. No reversible perfusion defect identified to suggest reversible ischemia. Function: The gated SPECT data demonstrates enlargement of the left ventricular cavity. Global hypokinesis. Left ventricular ejection fraction:  25% TID score:  0.99 (threshold value of 1.39 is used for Lexiscan stress with Tc-99m). There is no stress-induced cavitary dilatation to suggest compensated triple vessel disease. End diastolic volume:  517DF Scores are visually adjusted to account for potential artifact. Summed stress score:  22 Summed rest score:  22 Summed reversibility score:  0     1. Large infarct of the apical wall extending into the anterior, lateral, and inferior walls. 2. No definitive scintigraphic evidence for reversible ischemia. 3. Left ventricular ejection fraction of 25%. 4. Enlargement of the left ventricular cavity. Global hypokinesis. Findings can be seen with dilated cardiomyopathy. 5.  Please see report for EKG portion of the examination which will be performed separately by physician from cardiology. Risk stratification:  High risk Note:  Risk stratification incorporates both clinical history and test results. Final risk determination is the responsibility of the ordering provider as history and other test results may increase or decrease the risk stratification reported for this examination. Risk stratification criteria are adapted from \"Noninvasive Risk Stratification\" criteria from John Moulton. Al, ACC/AATS/AHA/ASE/ASNC/SCAI/SCCT/STS 2017 Appropriate Use Criteria For Coronary Revascularization in Patients With Stable Ischemic Heart Disease [facility] Volume 69, Issue 17, May 2017 High risk (>3% annual death or MI) 1.   Severe resting LV dysfunction discharged. Disposition: Home    Patient stated that they will not drive themselves home from the hospital if they have gotten pain killers/ narcotics earlier that day and that they will arrange for transportation on their own or work with the  for a ride. Patient counseled NOT to drive while under the influence of narcotics/ pain killers. Condition: Good    Patient stable and ready for discharge home. I have discussed plan of care with patient and they are in understanding. They were instructed to read discharge paperwork. All of their questions and concerns were addressed. Time Spent: 0 day      --  [employer], DO  Emergency Medicine Resident Physician    This dictation was generated by voice recognition computer software. Although all attempts are made to edit the dictation for accuracy, there may be errors in the transcription that are not intended.

## 2019-06-07 NOTE — TELEPHONE ENCOUNTER
Please address the medication refill and close the encounter. If I can be of assistance, please route to the applicable pool. Thank you. Last visit: 03/13/19  Last Med refill: 04/24/18  Does patient have enough medication for 72 hours: No: PATIENT OUT    Next Visit Date:  No future appointments. Health Maintenance   Topic Date Due    Hepatitis C screen  1955    HIV screen  07/06/1969    Shingles Vaccine (1 of 2) 07/06/2004    Low dose CT lung screening  07/06/2009    Colon Cancer Screen FIT/FOBT  04/05/2015    Breast cancer screen  03/25/2016    Flu vaccine (Season Ended) 09/01/2019    A1C test (Diabetic or Prediabetic)  11/12/2019    Potassium monitoring  04/14/2020    Creatinine monitoring  04/14/2020    Lipid screen  04/12/2023    DTaP/Tdap/Td vaccine (2 - Td) 12/07/2026    Pneumococcal 0-64 years Vaccine  Completed       Hemoglobin A1C (%)   Date Value   11/12/2018 6.0   08/25/2016 5.8   09/02/2014 5.9             ( goal A1C is < 7)   Microalb/Crt.  Ratio (mcg/mg creat)   Date Value   01/10/2013 16     LDL Cholesterol (mg/dL)   Date Value   04/12/2018 87   11/19/2017 138 (H)       (goal LDL is <100)   AST (U/L)   Date Value   03/13/2019 18     ALT (U/L)   Date Value   03/13/2019 13     BUN (mg/dL)   Date Value   04/14/2019 18     BP Readings from Last 3 Encounters:   04/15/19 128/86   03/13/19 138/70   11/19/18 (!) 160/102          (goal 120/80)    All Future Testing planned in [facility]  Lab Frequency Next Occurrence   DORCAS DIGITAL SCREEN W CAD BILATERAL Once 01/12/2020   POCT Fecal Immunochemical Test (FIT) Once 11/12/2019   Hepatitis C Antibody Once 11/12/2019   HIV Screen Once 11/12/2019               Patient Active Problem List:     CAD (coronary artery disease)     Chronic bronchitis (HCC)     Smoker     GERD (gastroesophageal reflux disease)     Thoracic scoliosis     Ganglion cyst     CAD S/P percutaneous coronary angioplasty (4/2013-Dr. Kris Perez)     CAD S/P percutaneous coronary angioplasty-open stent( 7/29/2013-Dr. nichols)     Chest pain     CAD (coronary artery disease)     Atypical chest pain     S/P - Mid RCA 10/17/14-Dr. nichols     S/P stents LAD, LCX and proximal RCA -      Hyperlipidemia     Chest pain      HLD (hyperlipidemia)     Chronic back pain     Precordial pain     Lumbosacral pain     Low back pain with sciatica     Intervertebral disk disease     Midline low back pain without sciatica     Hip pain     Essential hypertension     Headache in front of head     Accelerated hypertension     Speech disturbance     RBBB (right bundle branch block)     S/P cardiac cath - MV CAD 9/20/17 Dr. Gwyndolyn Castleman     Non-compliance     Cellulitis of arm, right     Superficial thrombophlebitis of right upper extremity     Tobacco use     NSTEMI (non-ST elevated myocardial infarction) ([address]     S/P drug eluting coronary stent placement     Dyspnea on exertion     Heart failure ([address]     Acute on chronic systolic (congestive) heart failure (HCC)     Ischemic cardiomyopathy     Cardiac LV ejection fraction 21-30%     Acute congestive heart failure (HCC)     Medication refill     Personal history of tobacco use     SOB (shortness of breath)

## 2019-10-05 LAB
CULTURE: NO GROWTH
EKG ATRIAL RATE: 120 BPM
EKG ATRIAL RATE: 145 BPM
EKG P AXIS: 63 DEGREES
EKG P AXIS: 67 DEGREES
EKG P-R INTERVAL: 116 MS
EKG Q-T INTERVAL: 288 MS
EKG Q-T INTERVAL: 452 MS
EKG QRS DURATION: 136 MS
EKG QRS DURATION: 158 MS
EKG QTC CALCULATION (BAZETT): 412 MS
EKG QTC CALCULATION (BAZETT): 447 MS
EKG R AXIS: -16 DEGREES
EKG R AXIS: -35 DEGREES
EKG T AXIS: 124 DEGREES
EKG T AXIS: 55 DEGREES
EKG VENTRICULAR RATE: 145 BPM
EKG VENTRICULAR RATE: 50 BPM
Lab: NORMAL
SPECIMEN DESCRIPTION: NORMAL

## 2019-10-10 ENCOUNTER — TELEPHONE (OUTPATIENT)
Dept: FAMILY MEDICINE CLINIC | Age: 64
End: 2019-10-10

## 2019-10-10 LAB
CULTURE: NORMAL
Lab: NORMAL
SPECIMEN DESCRIPTION: NORMAL

## 2021-04-02 NOTE — PLAN OF CARE
Problem: Falls - Risk of:  Goal: Will remain free from falls  Will remain free from falls   Outcome: Ongoing    Goal: Absence of physical injury  Absence of physical injury   Outcome: Ongoing RECORDS STATUS - ALL OTHER DIAGNOSIS    Pulmonary nodules and kidney masses  RECORDS RECEIVED FROM: Epic/[facility]   DATE RECEIVED: 2/24/2020    NOTES STATUS DETAILS   OFFICE NOTE from referring provider Complete Marco Antonio Field    OFFICE NOTE from medical oncologist N/A    DISCHARGE SUMMARY from hospital N/A    DISCHARGE REPORT from the ER     OPERATIVE REPORT N/A    MEDICATION LIST Complete  [facility]   CLINICAL TRIAL TREATMENTS TO DATE N/A    LABS     PATHOLOGY REPORTS N/A per pt's son    ANYTHING RELATED TO DIAGNOSIS Complete  EPIC   GENONOMIC TESTING     TYPE:     IMAGING (NEED IMAGES & REPORT)     CT SCANS Complete CT Angiogram 2/4/2020    Xray Chest Complete PACS- 8/12/2019, 6/22/2019, 1/29/2017, more in PACS   MRI     MAMMO     ULTRASOUND     PET       Action    Action Taken 2/11/2020 1:21pm     I called pt Shiraz to check on outside records. His son Willi answered the phone and confirmed that he has the power of . Pt doesn't have any bx slides and all records are internal.           [General Appearance - Alert] : alert [General Appearance - In No Acute Distress] : in no acute distress [Sclera] : the sclera and conjunctiva were normal [PERRL With Normal Accommodation] : pupils were equal in size, round, and reactive to light [Outer Ear] : the ears and nose were normal in appearance [Neck Appearance] : the appearance of the neck was normal [Neck Cervical Mass (___cm)] : no neck mass was observed [Jugular Venous Distention Increased] : there was no jugular-venous distention [Exaggerated Use Of Accessory Muscles For Inspiration] : no accessory muscle use [Apical Impulse] : the apical impulse was normal [Heart Sounds] : normal S1 and S2 [Arterial Pulses Carotid] : carotid pulses were normal with no bruits [Arterial Pulses Femoral] : femoral pulses were normal without bruits [Bowel Sounds] : normal bowel sounds [Abdomen Tenderness] : non-tender [Abnormal Walk] : normal gait [Nail Clubbing] : no clubbing  or cyanosis of the fingernails [Skin Color & Pigmentation] : normal skin color and pigmentation [] : no rash [Skin Lesions] : no skin lesions [Oriented To Time, Place, And Person] : oriented to person, place, and time [Affect] : the affect was normal [Mood] : the mood was normal